# Patient Record
Sex: MALE | Race: WHITE | Employment: OTHER | URBAN - METROPOLITAN AREA
[De-identification: names, ages, dates, MRNs, and addresses within clinical notes are randomized per-mention and may not be internally consistent; named-entity substitution may affect disease eponyms.]

---

## 2017-02-06 ENCOUNTER — IP HISTORICAL/CONVERTED ENCOUNTER (OUTPATIENT)
Dept: OTHER | Age: 82
End: 2017-02-06

## 2017-02-10 ENCOUNTER — TELEPHONE (OUTPATIENT)
Dept: NEUROLOGY | Age: 82
End: 2017-02-10

## 2017-02-10 NOTE — TELEPHONE ENCOUNTER
Pt's granddaughter calling to verify medications. He is in the hospital at [facility] and she wants to make sure the hospital gets him the proper dosages. She said 50% of the time he does not know where he is. Please give her a call back as soon as possible.

## 2017-02-22 RX ORDER — DONEPEZIL HYDROCHLORIDE 10 MG/1
TABLET, FILM COATED ORAL
Qty: 90 TAB | Refills: 5 | Status: SHIPPED | OUTPATIENT
Start: 2017-02-22

## 2017-05-08 ENCOUNTER — OFFICE VISIT (OUTPATIENT)
Dept: NEUROLOGY | Age: 82
End: 2017-05-08

## 2017-05-08 VITALS
HEART RATE: 72 BPM | HEIGHT: 65 IN | SYSTOLIC BLOOD PRESSURE: 100 MMHG | OXYGEN SATURATION: 97 % | DIASTOLIC BLOOD PRESSURE: 60 MMHG | RESPIRATION RATE: 16 BRPM | WEIGHT: 183 LBS | BODY MASS INDEX: 30.49 KG/M2

## 2017-05-08 DIAGNOSIS — G30.1 LATE ONSET ALZHEIMER'S DISEASE WITHOUT BEHAVIORAL DISTURBANCE (HCC): Primary | ICD-10-CM

## 2017-05-08 DIAGNOSIS — F02.80 LATE ONSET ALZHEIMER'S DISEASE WITHOUT BEHAVIORAL DISTURBANCE (HCC): Primary | ICD-10-CM

## 2017-05-08 RX ORDER — CARVEDILOL 12.5 MG/1
TABLET ORAL 2 TIMES DAILY WITH MEALS
COMMUNITY

## 2017-05-08 RX ORDER — MEMANTINE HYDROCHLORIDE 5 MG-10 MG
KIT ORAL
Qty: 40 TAB | Refills: 1 | Status: SHIPPED | OUTPATIENT
Start: 2017-05-08 | End: 2020-08-11 | Stop reason: SDUPTHER

## 2017-05-08 RX ORDER — LISINOPRIL 2.5 MG/1
TABLET ORAL DAILY
COMMUNITY

## 2017-05-08 RX ORDER — MONTELUKAST SODIUM 10 MG/1
10 TABLET ORAL DAILY
COMMUNITY

## 2017-05-08 RX ORDER — BUMETANIDE 0.5 MG/1
TABLET ORAL DAILY
COMMUNITY

## 2017-05-08 RX ORDER — TAMSULOSIN HYDROCHLORIDE 0.4 MG/1
0.4 CAPSULE ORAL DAILY
COMMUNITY

## 2017-05-08 NOTE — PROGRESS NOTES
Chief Complaint   Patient presents with    Memory Loss         HISTORY OF PRESENT ILLNESS  Inga Aguilera came back for a follow up. He was last seen a year ago and was diagnosed with mild to moderate dementia of Alzheimer's type. He came along with his daughter . He was recently in the hospital for pneumonia that he developed the day after he returned from a cruise. Daughter believes that his memory has declined especially since his recent hospitalization. He denies any change . He is currently on donepezil and took Namenda starter pack but did not continue it. His memory problem started about 2 years ago . He was an  and used to work for [employer]. He has always been very sharp and organized person. However he is having difficulty with doing simpler things and has become very forgetful. He will tend to ask the same question over and over again. He remains independent with the all activities of daily living. 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 He does not drive. He has COPD and uses home oxygen. There are no reports of him getting lost. There are no behavioral or sleep issues. He is currently living with his wife in their home in [place]. Current Outpatient Prescriptions   Medication Sig    carvedilol (COREG) 12.5 mg tablet Take  by mouth two (2) times daily (with meals).  bumetanide (BUMEX) 0.5 mg tablet Take  by mouth daily.  tamsulosin (FLOMAX) 0.4 mg capsule Take 0.4 mg by mouth daily.  montelukast (SINGULAIR) 10 mg tablet Take 10 mg by mouth daily.  lisinopril (PRINIVIL, ZESTRIL) 2.5 mg tablet Take  by mouth daily.  Cetirizine (ZYRTEC) 10 mg cap Take  by mouth.  multivitamin, tx-iron-ca-min (THERA-M W/ IRON) 9 mg iron-400 mcg tab tablet Take 1 Tab by mouth daily.  VIT A/VIT C/VIT E/ZINC/COPPER (PRESERVISION AREDS PO) Take  by mouth.     memantine 5-10 mg DsPk Take as directed    donepezil (ARICEPT) 10 mg tablet TAKE 1 TABLET BY MOUTH NIGHTLY    fluticasone-salmeterol (ADVAIR DISKUS) 500-50 mcg/dose diskus inhaler Take 1 Puff by inhalation every twelve (12) hours.  simvastatin (ZOCOR) 10 mg tablet Take  by mouth nightly. No current facility-administered medications for this visit. PHYSICAL EXAMINATION:    Visit Vitals    /60    Pulse 72    Resp 16    Ht 5' 5\" (1.651 m)    Wt 83 kg (183 lb)    SpO2 97%    BMI 30.45 kg/m2         NEUROLOGICAL EXAMINATION:     Mental Status:   Alert and oriented to person. He could not remember today's date correctly. He did not know the name of this hospital.  he scored 21/30 on [facility] cognitive assessment. His main issue is short-term recall and delayed recall which was 0/5. Attention span and concentration are normal. Speech is fluent with a full fund of knowledge. Cranial Nerves:    II, III, IV, VI:  Visual acuity grossly intact. Visual fields are normal.    Pupils are equal, round, and reactive to light and accommodation. Extra-ocular movements are full and fluid. Fundoscopic exam was benign, no ptosis or nystagmus. V-XII: Hearing is grossly intact. Facial features are symmetric, with normal sensation and strength. The palate rises symmetrically and the tongue protrudes midline. Sternocleidomastoids 5/5. Motor Examination: Normal tone, bulk, and strength. 5/5 muscle strength throughout. No cogwheel rigidity or clonus present. Sensory exam:  Normal throughout to pinprick, temperature, and vibration sense. Normal proprioception. Coordination:  Heel-to-shin was smooth and symmetrical bilaterally. Finger to nose and rapid arm movement testing was normal.   No resting or intention tremor    Gait and Station:  Steady while walking on toes, heels, and with tandem walking. Normal arm swing. No Rhomberg or pronator drift. No muscle wasting or fasiculations noted. Reflexes:  DTRs 2+ throughout. Toes downgoing.       LABS    CT brain and B12 was normal.TSH was elevated. T3 and T4 was normal.         ASSESSMENT    ICD-10-CM ICD-9-CM    1. Late onset Alzheimer's disease without behavioral disturbance G30.1 331.0 memantine 5-10 mg DsPk    F02.80 294.10          DISCUSSION  Mr. Tyesha Faria Has mild to moderate dementia, likely of Alzheimer's type. He has had a slight decline over the past 6 months. Will maintain him on Aricept and restart Namenda. He remains independent without all activities of daily living. He is currently living with his wife and their daughter checks on them regularly. There are no safety concerns reported by family at this point. He wishes to follow-up with a neurologist closer to his home and I will continue to provide him with any care that he may need until then.      Jenifer Joshi MD  Diplomate, American Board of Psychiatry & Neurology (Neurology)  Daniel Leung Board of Psychiatry & Neurology (Clinical Neurophysiology)  Diplomate, American Board of Electrodiagnostic Medicine

## 2017-05-08 NOTE — PROGRESS NOTES
Patient is here for follow up  Had pneumonia   Also had a bleeding episode on Plavix  Memory is worse according to daughter

## 2017-05-08 NOTE — MR AVS SNAPSHOT
Visit Information Date & Time Provider Department Dept. Phone Encounter #  
 5/8/2017  3:15 PM Lazaro Lambert MD [facility] at [facility] [phone] Follow-up Instructions Return if symptoms worsen or fail to improve. Upcoming Health Maintenance Date Due DTaP/Tdap/Td series (1 - Tdap) 12/10/1955 ZOSTER VACCINE AGE 60> 12/10/1994 GLAUCOMA SCREENING Q2Y 12/10/1999 Pneumococcal 65+ Low/Medium Risk (1 of 2 - PCV13) 12/10/1999 MEDICARE YEARLY EXAM 12/10/1999 INFLUENZA AGE 9 TO ADULT 8/1/2017 Allergies as of 5/8/2017  Review Complete On: 5/8/2017 By: Lazaro Lambert MD  
 No Known Allergies Current Immunizations  Never Reviewed No immunizations on file. Not reviewed this visit You Were Diagnosed With   
  
 Codes Comments Late onset Alzheimer's disease without behavioral disturbance    -  Primary ICD-10-CM: G30.1, F02.80 ICD-9-CM: 331.0, 294.10 Vitals BP Pulse Resp Height(growth percentile) Weight(growth percentile) SpO2  
 100/60 72 16 5' 5\" (1.651 m) 183 lb (83 kg) 97% BMI Smoking Status 30.45 kg/m2 Never Smoker Vitals History BMI and BSA Data Body Mass Index Body Surface Area  
 30.45 kg/m 2 1.95 m 2 Preferred Pharmacy Pharmacy Name Phone [facility] [address] [facility] [phone] Your Updated Medication List  
  
   
This list is accurate as of: 5/8/17  4:00 PM.  Always use your most recent med list.  
  
  
  
  
 ADVAIR DISKUS 500-50 mcg/dose diskus inhaler Generic drug:  fluticasone-salmeterol Take 1 Puff by inhalation every twelve (12) hours. bumetanide 0.5 mg tablet Commonly known as:  Shirrosalie Nicholasch Take  by mouth daily. COREG 12.5 mg tablet Generic drug:  carvedilol Take  by mouth two (2) times daily (with meals).   
  
 donepezil 10 mg tablet Commonly known as:  ARICEPT  
TAKE 1 TABLET BY MOUTH NIGHTLY  
  
 lisinopril 2.5 mg tablet Commonly known as:  Salvadore Dine Take  by mouth daily. memantine 5-10 mg Dspk Take as directed  
  
 montelukast 10 mg tablet Commonly known as:  SINGULAIR Take 10 mg by mouth daily. multivitamin, tx-iron-ca-min 9 mg iron-400 mcg Tab tablet Commonly known as:  THERA-M w/ IRON Take 1 Tab by mouth daily. PRESERVISION AREDS PO Take  by mouth. tamsulosin 0.4 mg capsule Commonly known as:  FLOMAX Take 0.4 mg by mouth daily. ZOCOR 10 mg tablet Generic drug:  simvastatin Take  by mouth nightly. ZyrTEC 10 mg Cap Generic drug:  Cetirizine Take  by mouth. Prescriptions Sent to Pharmacy Refills  
 memantine 5-10 mg DsPk 1 Sig: Take as directed Class: Normal  
 Pharmacy: [facility] [place] [id number] AT [address] #: [phone] Follow-up Instructions Return if symptoms worsen or fail to improve. Patient Instructions A Healthy Lifestyle: Care Instructions Your Care Instructions A healthy lifestyle can help you feel good, stay at a healthy weight, and have plenty of energy for both work and play. A healthy lifestyle is something you can share with your whole family. A healthy lifestyle also can lower your risk for serious health problems, such as high blood pressure, heart disease, and diabetes. You can follow a few steps listed below to improve your health and the health of your family. Follow-up care is a key part of your treatment and safety. Be sure to make and go to all appointments, and call your doctor if you are having problems. Its also a good idea to know your test results and keep a list of the medicines you take. How can you care for yourself at home? · Do not eat too much sugar, fat, or fast foods.  You can still have dessert and treats now and then. The goal is moderation. · Start small to improve your eating habits. Pay attention to portion sizes, drink less juice and soda pop, and eat more fruits and vegetables. ¨ Eat a healthy amount of food. A 3-ounce serving of meat, for example, is about the size of a deck of cards. Fill the rest of your plate with vegetables and whole grains. ¨ Limit the amount of soda and sports drinks you have every day. Drink more water when you are thirsty. ¨ Eat at least 5 servings of fruits and vegetables every day. It may seem like a lot, but it is not hard to reach this goal. A serving or helping is 1 piece of fruit, 1 cup of vegetables, or 2 cups of leafy, raw vegetables. Have an apple or some carrot sticks as an afternoon snack instead of a candy bar. Try to have fruits and/or vegetables at every meal. 
· Make exercise part of your daily routine. You may want to start with simple activities, such as walking, bicycling, or slow swimming. Try to be active 30 to 60 minutes every day. You do not need to do all 30 to 60 minutes all at once. For example, you can exercise 3 times a day for 10 or 20 minutes. Moderate exercise is safe for most people, but it is always a good idea to talk to your doctor before starting an exercise program. 
· Keep moving. [employer] the lawn, work in the garden, or [employer]. Take the stairs instead of the elevator at work. · If you smoke, quit. People who smoke have an increased risk for heart attack, stroke, cancer, and other lung illnesses. Quitting is hard, but there are ways to boost your chance of quitting tobacco for good. ¨ Use nicotine gum, patches, or lozenges. ¨ Ask your doctor about stop-smoking programs and medicines. ¨ Keep trying. In addition to reducing your risk of diseases in the future, you will notice some benefits soon after you stop using tobacco. If you have shortness of breath or asthma symptoms, they will likely get better within a few weeks after you quit.  
· Limit how much alcohol you drink. Moderate amounts of alcohol (up to 2 drinks a day for men, 1 drink a day for women) are okay. But drinking too much can lead to liver problems, high blood pressure, and other health problems. Family health If you have a family, there are many things you can do together to improve your health. · Eat meals together as a family as often as possible. · Eat healthy foods. This includes fruits, vegetables, lean meats and dairy, and whole grains. · Include your family in your fitness plan. Most people think of activities such as jogging or tennis as the way to fitness, but there are many ways you and your family can be more active. Anything that makes you breathe hard and gets your heart pumping is exercise. Here are some tips: 
¨ Walk to do errands or to take your child to school or the bus. ¨ Go for a family bike ride after dinner instead of watching TV. Where can you learn more? Go to http://chris-noe.info/. Enter G182 in the search box to learn more about \"A Healthy Lifestyle: Care Instructions. \" Current as of: July 26, 2016 Content Version: 11.2 © 2851-3217 BackupAgent. Care instructions adapted under license by [employer] (which disclaims liability or warranty for this information). If you have questions about a medical condition or this instruction, always ask your healthcare professional. [address] any warranty or liability for your use of this information. Introducing [facility] [place] [facility]! Heladio Hernandez introduces QuickProNotes patient portal. Now you can access parts of your medical record, email your doctor's office, and request medication refills online. 1. In your internet browser, go to [email or web address] [email or web address]/[employer] 2. Click on the First Time User? Click Here link in the Sign In box. You will see the New Member Sign Up page.  
3. Enter your QuickProNotes Access Code exactly as it appears below. You will not need to use this code after youve completed the sign-up process. If you do not sign up before the expiration date, you must request a new code. · PoshVine Access Code: [email or web address] Expires: 8/6/2017  3:17 PM 
 
4. Enter the last four digits of your Social Security Number (xxxx) and Date of Birth (mm/dd/yyyy) as indicated and click Submit. You will be taken to the next sign-up page. 5. Create a PoshVine ID. This will be your PoshVine login ID and cannot be changed, so think of one that is secure and easy to remember. 6. Create a PoshVine password. You can change your password at any time. 7. Enter your Password Reset Question and Answer. This can be used at a later time if you forget your password. 8. Enter your e-mail address. You will receive e-mail notification when new information is available in [address] 9. Click Sign Up. You can now view and download portions of your medical record. 10. Click the Download Summary menu link to download a portable copy of your medical information. If you have questions, please visit the Frequently Asked Questions section of the PoshVine website. Remember, PoshVine is NOT to be used for urgent needs. For medical emergencies, dial 911. Now available from your iPhone and Android! Please provide this summary of care documentation to your next provider. Your primary care clinician is listed as Tania Petty. If you have any questions after today's visit, please call [phone].

## 2017-11-22 ENCOUNTER — OP HISTORICAL/CONVERTED ENCOUNTER (OUTPATIENT)
Dept: OTHER | Age: 82
End: 2017-11-22

## 2017-12-01 ENCOUNTER — OP HISTORICAL/CONVERTED ENCOUNTER (OUTPATIENT)
Dept: OTHER | Age: 82
End: 2017-12-01

## 2018-01-01 ENCOUNTER — OP HISTORICAL/CONVERTED ENCOUNTER (OUTPATIENT)
Dept: OTHER | Age: 83
End: 2018-01-01

## 2020-08-11 ENCOUNTER — OFFICE VISIT (OUTPATIENT)
Dept: NEUROLOGY | Facility: CLINIC | Age: 85
End: 2020-08-11
Payer: MEDICARE

## 2020-08-11 ENCOUNTER — TELEPHONE (OUTPATIENT)
Dept: NEUROLOGY | Facility: CLINIC | Age: 85
End: 2020-08-11

## 2020-08-11 VITALS
WEIGHT: 174 LBS | RESPIRATION RATE: 16 BRPM | SYSTOLIC BLOOD PRESSURE: 132 MMHG | OXYGEN SATURATION: 98 % | HEART RATE: 66 BPM | HEIGHT: 65 IN | DIASTOLIC BLOOD PRESSURE: 80 MMHG | BODY MASS INDEX: 28.99 KG/M2

## 2020-08-11 DIAGNOSIS — G30.9 MIXED ALZHEIMER'S AND VASCULAR DEMENTIA (HCC): Primary | ICD-10-CM

## 2020-08-11 DIAGNOSIS — F01.50 MIXED ALZHEIMER'S AND VASCULAR DEMENTIA (HCC): Primary | ICD-10-CM

## 2020-08-11 DIAGNOSIS — F02.80 MIXED ALZHEIMER'S AND VASCULAR DEMENTIA (HCC): Primary | ICD-10-CM

## 2020-08-11 PROCEDURE — G8510 SCR DEP NEG, NO PLAN REQD: HCPCS | Performed by: PSYCHIATRY & NEUROLOGY

## 2020-08-11 PROCEDURE — G8536 NO DOC ELDER MAL SCRN: HCPCS | Performed by: PSYCHIATRY & NEUROLOGY

## 2020-08-11 PROCEDURE — 1101F PT FALLS ASSESS-DOCD LE1/YR: CPT | Performed by: PSYCHIATRY & NEUROLOGY

## 2020-08-11 PROCEDURE — 99204 OFFICE O/P NEW MOD 45 MIN: CPT | Performed by: PSYCHIATRY & NEUROLOGY

## 2020-08-11 PROCEDURE — G8419 CALC BMI OUT NRM PARAM NOF/U: HCPCS | Performed by: PSYCHIATRY & NEUROLOGY

## 2020-08-11 PROCEDURE — G8427 DOCREV CUR MEDS BY ELIG CLIN: HCPCS | Performed by: PSYCHIATRY & NEUROLOGY

## 2020-08-11 RX ORDER — MEMANTINE HYDROCHLORIDE 5 MG-10 MG
KIT ORAL
Qty: 40 TAB | Refills: 1 | Status: SHIPPED | OUTPATIENT
Start: 2020-08-11 | End: 2020-09-08 | Stop reason: DRUGHIGH

## 2020-08-11 RX ORDER — BENZONATATE 100 MG/1
CAPSULE ORAL
COMMUNITY
Start: 2020-05-25

## 2020-08-11 NOTE — PROGRESS NOTES
Chief Complaint   Patient presents with    Follow-up     patient grand daughter states he has been doing less activities than usual. she states his appetite has also decreased.  Dementia     patient states he hate the fact he cant remember what he has done or where he placed somethng. he states it will eventually come to him. his grand daughter states she will have to refresh his memory every few minutes. HISTORY OF PRESENT ILLNESS  Mattie Olivas is a 80 y.o. male came in for an evaluation regarding his memory disorder. He has been evaluated by me in the past but last time he was seen over 3 years ago. He suspected to have follow-up mixed type of dementia. He came in along with his granddaughter today who tells me that overall there has been a slow decline. He is now more forgetful, tends to ask the same question repeatedly. He is unable to finish projects that he starts at home and is simply not able to do certain things that he was able to do before. He is living alone but his family checks on him multiple times a day and ensures that he takes his medications and eats all his meals. He is alone at night but there are no issues with sleep and no safety concerns expressed. He does not handle money or drive anymore. He remains independent with basic activities of daily living. 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 Has COPD and uses home oxygen. No reports ever of him getting lost.  No behavioral issues      Past Medical History:   Diagnosis Date    COPD (chronic obstructive pulmonary disease) (HCC)     Macular degeneration of right eye      Current Outpatient Medications   Medication Sig    benzonatate (TESSALON) 100 mg capsule TK 1 C PO TID PRF COUGH    memantine 5-10 mg DsPk Take as directed    carvedilol (COREG) 12.5 mg tablet Take  by mouth two (2) times daily (with meals).  bumetanide (BUMEX) 0.5 mg tablet Take  by mouth daily.     montelukast (SINGULAIR) 10 mg tablet Take 10 mg by mouth daily.  lisinopril (PRINIVIL, ZESTRIL) 2.5 mg tablet Take  by mouth daily.  Cetirizine (ZYRTEC) 10 mg cap Take  by mouth.  multivitamin, tx-iron-ca-min (THERA-M W/ IRON) 9 mg iron-400 mcg tab tablet Take 1 Tab by mouth daily.  VIT A/VIT C/VIT E/ZINC/COPPER (PRESERVISION AREDS PO) Take  by mouth.  donepezil (ARICEPT) 10 mg tablet TAKE 1 TABLET BY MOUTH NIGHTLY    fluticasone-salmeterol (ADVAIR DISKUS) 500-50 mcg/dose diskus inhaler Take 1 Puff by inhalation every twelve (12) hours.  simvastatin (ZOCOR) 10 mg tablet Take  by mouth nightly.  tamsulosin (FLOMAX) 0.4 mg capsule Take 0.4 mg by mouth daily. No current facility-administered medications for this visit. No Known Allergies  Family History   Problem Relation Age of Onset    Cancer Mother         Intestional    Stroke Father      Social History     Tobacco Use    Smoking status: Never Smoker    Smokeless tobacco: Never Used   Substance Use Topics    Alcohol use: No    Drug use: No     Past Surgical History:   Procedure Laterality Date    HX PROSTATECTOMY           REVIEW OF SYSTEMS  Review of Systems - History obtained from the patient  Psychological ROS: negative  ENT ROS: negative  Hematological and Lymphatic ROS: negative  Endocrine ROS: negative  Respiratory ROS: no cough, shortness of breath, or wheezing  Cardiovascular ROS: no chest pain or dyspnea on exertion  Gastrointestinal ROS: no abdominal pain, change in bowel habits, or black or bloody stools  Genito-Urinary ROS: no dysuria, trouble voiding, or hematuria  Musculoskeletal ROS: negative  Dermatological ROS: negative      PHYSICAL EXAMINATION:    Visit Vitals  /80 (BP 1 Location: Left arm, BP Patient Position: Sitting)   Pulse 66   Resp 16   Ht 5' 5\" (1.651 m)   Wt 78.9 kg (174 lb)   SpO2 98%   BMI 28.96 kg/m²     General:  Well nourished and groomed individual in no acute distress.     Neck: Supple, nontender, no bruits, no pain with resistance to active range of motion. Heart: Regular rate and rhythm. Normal S1S2. Lungs:  Equal chest expansion, no cough, no wheeze  Musculoskeletal:  Extremities revealed no edema and had full range of motion of joints. Psych:  Good mood and bright affect    NEUROLOGICAL EXAMINATION:     Mental Status:   Alert and oriented to person. He could not tell me today's date or name of this hospital.  He scored 22/30 on Yo cognitive assessment. His main concern is impaired short-term recall which is 0/5. Cranial Nerves:    II, III, IV, VI: He has legally blind in the right eye. Extra-ocular movements are full. V-XII: Hearing is grossly intact. Facial features are symmetric, with normal sensation and strength. The palate rises symmetrically and the tongue protrudes midline. Sternocleidomastoids 5/5. Motor Examination: Normal tone, bulk, and strength. 5/5 muscle strength throughout. No cogwheel rigidity or clonus present. Sensory exam:  Normal throughout to pinprick, temperature, and vibration sense. Normal proprioception. Coordination:  Finger to nose and rapid arm movement testing was normal.   No resting or intention tremor    Gait and Station:  Steady. Normal arm swing. No Rhomberg or pronator drift. No muscle wasting or fasiculations noted. Reflexes:  DTRs 2+ throughout. Toes downgoing. LABS / IMAGING  CT Results (most recent):  Results from Hospital Encounter encounter on 04/29/15   CT HEAD WO CONT    Narrative **Final Report**       ICD Codes / Adm. Diagnosis: 780.93   / Memory loss    Examination:  CT HEAD WO CON  - [id number] - Apr 29 2015  3:43PM  Accession No:  [id number]  Reason:  Memory Loss      REPORT:  Indication: Memory loss    Multiple axial images were obtained from the skull base to the vertex   without the use of intravenous contrast. Ventricles and sulci are normal for   patient's age. There is no midline shift or herniation.  The examination is   negative for acute infarct, mass lesion, or hemorrhage. 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 Vascular calcification is noted. IMPRESSION: No acute process. Signing/Reading Doctor: Ines Burton ([id number])    Approved: Ines Burton ([id number])  Apr 29 2015  3:59PM                                 Lab Results   Component Value Date/Time    Vitamin B12 671 04/27/2015 03:18 PM     Lab Results   Component Value Date/Time    TSH 5.880 (H) 04/27/2015 03:18 PM         ASSESSMENT    ICD-10-CM ICD-9-CM    1. [facility]'s [facility]  G30.9 331.0 memantine 5-10 mg DsPk    F01.50 294.10     F02.80 290.40        DISCUSSION  Mr. 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 His cognitive scores have more or less remained stable over the past 3 years and family was reassured in this regard  He is currently living alone but has a very supportive family who ensures that he takes his medications daily and eats all his meals  Per family he is more repetitive and forgetful and is unable to complete projects that he starts at home  He is currently on donepezil and will start Namenda and titrate up in a customary fashion  He should try to remain physically and mentally active and different resources to strengthen his memory were discussed  Follow-up periodically    Hemalatha Dixon MD  Diplomate, American Board of Psychiatry & Neurology (Neurology)  Diplomate, American Board of Psychiatry & Neurology (Clinical Neurophysiology)  Diplomate, American Board of Electrodiagnostic Medicine    This note will not be viewable in [address]

## 2020-08-11 NOTE — PROGRESS NOTES
patient states he hate the fact he cant remember what he has done or where he placed somethng. he states it will eventually come to him. his grand daughter states she will have to refresh his memory every few minutes. patient grand daughter states he has been doing less activities than usual. she states his appetite has also decreased. No other concerns at this time. .  Chief Complaint   Patient presents with    Follow-up     patient grand daughter states he has been doing less activities than usual. she states his appetite has also decreased.  Dementia     patient states he hate the fact he cant remember what he has done or where he placed somethng. he states it will eventually come to him. his grand daughter states she will have to refresh his memory every few minutes.      .  Visit Vitals  /80 (BP 1 Location: Left arm, BP Patient Position: Sitting)   Pulse 66   Resp 16   Ht 5' 5\" (1.651 m)   Wt 174 lb (78.9 kg)   SpO2 98%   BMI 28.96 kg/m²

## 2020-09-08 DIAGNOSIS — G30.9 MIXED ALZHEIMER'S AND VASCULAR DEMENTIA (HCC): Primary | ICD-10-CM

## 2020-09-08 DIAGNOSIS — F02.80 MIXED ALZHEIMER'S AND VASCULAR DEMENTIA (HCC): Primary | ICD-10-CM

## 2020-09-08 DIAGNOSIS — F01.50 MIXED ALZHEIMER'S AND VASCULAR DEMENTIA (HCC): Primary | ICD-10-CM

## 2020-09-08 RX ORDER — MEMANTINE HYDROCHLORIDE 10 MG/1
10 TABLET ORAL 2 TIMES DAILY
Qty: 60 TAB | Refills: 3 | Status: SHIPPED | OUTPATIENT
Start: 2020-09-08 | End: 2021-01-12

## 2021-01-12 DIAGNOSIS — F02.80 MIXED ALZHEIMER'S AND VASCULAR DEMENTIA (HCC): ICD-10-CM

## 2021-01-12 DIAGNOSIS — F01.50 MIXED ALZHEIMER'S AND VASCULAR DEMENTIA (HCC): ICD-10-CM

## 2021-01-12 DIAGNOSIS — G30.9 MIXED ALZHEIMER'S AND VASCULAR DEMENTIA (HCC): ICD-10-CM

## 2021-01-12 RX ORDER — MEMANTINE HYDROCHLORIDE 10 MG/1
TABLET ORAL
Qty: 60 TAB | Refills: 3 | Status: SHIPPED | OUTPATIENT
Start: 2021-01-12 | End: 2021-04-23

## 2022-03-09 DIAGNOSIS — F02.80 MIXED ALZHEIMER'S AND VASCULAR DEMENTIA (HCC): ICD-10-CM

## 2022-03-09 DIAGNOSIS — F01.50 MIXED ALZHEIMER'S AND VASCULAR DEMENTIA (HCC): ICD-10-CM

## 2022-03-09 DIAGNOSIS — G30.9 MIXED ALZHEIMER'S AND VASCULAR DEMENTIA (HCC): ICD-10-CM

## 2022-03-10 RX ORDER — MEMANTINE HYDROCHLORIDE 10 MG/1
TABLET ORAL
Qty: 180 TABLET | Refills: 2 | Status: SHIPPED | OUTPATIENT
Start: 2022-03-10

## 2022-05-09 NOTE — PATIENT INSTRUCTIONS

## 2022-06-02 ENCOUNTER — HOSPITAL ENCOUNTER (INPATIENT)
Age: 87
LOS: 1 days | Discharge: HOME | DRG: 191 | End: 2022-06-03
Attending: EMERGENCY MEDICINE | Admitting: INTERNAL MEDICINE
Payer: MEDICARE

## 2022-06-02 DIAGNOSIS — R06.02 SOB (SHORTNESS OF BREATH): ICD-10-CM

## 2022-06-02 DIAGNOSIS — I10 HYPERTENSION, UNSPECIFIED TYPE: ICD-10-CM

## 2022-06-02 DIAGNOSIS — F03.90 DEMENTIA WITHOUT BEHAVIORAL DISTURBANCE, UNSPECIFIED DEMENTIA TYPE: ICD-10-CM

## 2022-06-02 DIAGNOSIS — J44.1 COPD EXACERBATION (HCC): ICD-10-CM

## 2022-06-02 DIAGNOSIS — J18.9 PNEUMONIA OF BOTH LOWER LOBES DUE TO INFECTIOUS ORGANISM: Primary | ICD-10-CM

## 2022-06-02 PROCEDURE — 96374 THER/PROPH/DIAG INJ IV PUSH: CPT

## 2022-06-02 PROCEDURE — 99285 EMERGENCY DEPT VISIT HI MDM: CPT

## 2022-06-02 PROCEDURE — 96375 TX/PRO/DX INJ NEW DRUG ADDON: CPT

## 2022-06-02 RX ORDER — DOXYCYCLINE 100 MG/1
100 CAPSULE ORAL 2 TIMES DAILY
COMMUNITY

## 2022-06-02 RX ORDER — IPRATROPIUM BROMIDE AND ALBUTEROL SULFATE 2.5; .5 MG/3ML; MG/3ML
6 SOLUTION RESPIRATORY (INHALATION)
Status: COMPLETED | OUTPATIENT
Start: 2022-06-02 | End: 2022-06-03

## 2022-06-02 NOTE — Clinical Note
Status[de-identified] INPATIENT [101]   Type of Bed: Stepdown [17]   Cardiac Monitoring Required?: Yes   Inpatient Hospitalization Certified Necessary for the Following Reasons: 3.  Patient receiving treatment that can only be provided in an inpatient setting (further clarification in H&P documentation)   Admitting Diagnosis: CHF (congestive heart failure) [facility]) [[id number]]   Admitting Physician: Rolo Paez [[id number]]   Attending Physician: Rolo Paez [[id number]]   Estimated Length of Stay: 3-4 Midnights   Discharge Plan[de-identified] Home with Office Follow-up

## 2022-06-03 ENCOUNTER — APPOINTMENT (OUTPATIENT)
Dept: CT IMAGING | Age: 87
DRG: 191 | End: 2022-06-03
Attending: EMERGENCY MEDICINE
Payer: MEDICARE

## 2022-06-03 ENCOUNTER — APPOINTMENT (OUTPATIENT)
Dept: NON INVASIVE DIAGNOSTICS | Age: 87
DRG: 191 | End: 2022-06-03
Attending: INTERNAL MEDICINE
Payer: MEDICARE

## 2022-06-03 ENCOUNTER — APPOINTMENT (OUTPATIENT)
Dept: GENERAL RADIOLOGY | Age: 87
DRG: 191 | End: 2022-06-03
Attending: EMERGENCY MEDICINE
Payer: MEDICARE

## 2022-06-03 VITALS
HEART RATE: 84 BPM | BODY MASS INDEX: 29.49 KG/M2 | DIASTOLIC BLOOD PRESSURE: 67 MMHG | WEIGHT: 177 LBS | SYSTOLIC BLOOD PRESSURE: 105 MMHG | TEMPERATURE: 97.7 F | RESPIRATION RATE: 16 BRPM | OXYGEN SATURATION: 97 % | HEIGHT: 65 IN

## 2022-06-03 PROBLEM — I50.9 CHF (CONGESTIVE HEART FAILURE) (HCC): Status: ACTIVE | Noted: 2022-06-03

## 2022-06-03 PROBLEM — D72.829 LEUKOCYTOSIS: Status: ACTIVE | Noted: 2022-06-03

## 2022-06-03 PROBLEM — R60.0 EDEMA, LOWER EXTREMITY: Status: ACTIVE | Noted: 2022-06-03

## 2022-06-03 PROBLEM — J96.11 CHRONIC HYPOXEMIC RESPIRATORY FAILURE (HCC): Status: ACTIVE | Noted: 2022-06-03

## 2022-06-03 LAB
ALBUMIN SERPL-MCNC: 3.9 G/DL (ref 3.5–5.2)
ALBUMIN/GLOB SERPL: 1.1 {RATIO} (ref 1.1–2.2)
ALP SERPL-CCNC: 32 U/L (ref 40–129)
ALT SERPL-CCNC: 14 U/L (ref 10–50)
ANION GAP SERPL CALC-SCNC: 12 MMOL/L (ref 5–15)
AST SERPL-CCNC: 22 U/L (ref 10–50)
ATRIAL RATE: 90 BPM
B PERT DNA SPEC QL NAA+PROBE: NOT DETECTED
BASE DEFICIT BLDV-SCNC: 0.2 MMOL/L
BASOPHILS # BLD: 0 K/UL
BASOPHILS NFR BLD: 0 %
BILIRUB SERPL-MCNC: 0.9 MG/DL (ref 0.2–1)
BNP SERPL-MCNC: 657 PG/ML
BORDETELLA PARAPERTUSSIS PCR, BORPAR: NOT DETECTED
BUN SERPL-MCNC: 17 MG/DL (ref 8–23)
BUN/CREAT SERPL: 20 (ref 12–20)
C PNEUM DNA SPEC QL NAA+PROBE: NOT DETECTED
CALCIUM SERPL-MCNC: 9.1 MG/DL (ref 8.8–10.2)
CALCULATED P AXIS, ECG09: 81 DEGREES
CALCULATED R AXIS, ECG10: 19 DEGREES
CALCULATED T AXIS, ECG11: 92 DEGREES
CHLORIDE SERPL-SCNC: 97 MMOL/L (ref 98–107)
CO2 SERPL-SCNC: 26 MMOL/L (ref 22–29)
COVID-19 RAPID TEST, COVR: NOT DETECTED
CREAT SERPL-MCNC: 0.87 MG/DL (ref 0.7–1.2)
D DIMER PPP FEU-MCNC: 0.59 UG/ML(FEU)
DIAGNOSIS, 93000: NORMAL
DIFFERENTIAL METHOD BLD: ABNORMAL
ECHO AO ASC DIAM: 2.1 CM
ECHO AO ASCENDING AORTA INDEX: 1.12 CM/M2
ECHO AV AREA PEAK VELOCITY: 2 CM2
ECHO AV AREA VTI: 1.9 CM2
ECHO AV AREA/BSA PEAK VELOCITY: 1.1 CM2/M2
ECHO AV AREA/BSA VTI: 1 CM2/M2
ECHO AV MEAN GRADIENT: 2 MMHG
ECHO AV MEAN VELOCITY: 0.7 M/S
ECHO AV PEAK GRADIENT: 5 MMHG
ECHO AV PEAK VELOCITY: 1.1 M/S
ECHO AV VELOCITY RATIO: 0.73
ECHO AV VTI: 27.4 CM
ECHO LA DIAMETER INDEX: 1.86 CM/M2
ECHO LA DIAMETER: 3.5 CM
ECHO LA VOL 2C: 40 ML (ref 18–58)
ECHO LA VOL 4C: 36 ML (ref 18–58)
ECHO LA VOL BP: 38 ML (ref 18–58)
ECHO LA VOL/BSA BIPLANE: 20 ML/M2 (ref 16–34)
ECHO LA VOLUME AREA LENGTH: 40 ML
ECHO LA VOLUME INDEX A2C: 21 ML/M2 (ref 16–34)
ECHO LA VOLUME INDEX A4C: 19 ML/M2 (ref 16–34)
ECHO LA VOLUME INDEX AREA LENGTH: 21 ML/M2 (ref 16–34)
ECHO LV E' LATERAL VELOCITY: 10 CM/S
ECHO LV E' SEPTAL VELOCITY: 7 CM/S
ECHO LV EDV A2C: 81 ML
ECHO LV EDV A4C: 80 ML
ECHO LV EDV BP: 81 ML (ref 67–155)
ECHO LV EDV INDEX A4C: 43 ML/M2
ECHO LV EDV INDEX BP: 43 ML/M2
ECHO LV EDV NDEX A2C: 43 ML/M2
ECHO LV EJECTION FRACTION A2C: 55 %
ECHO LV EJECTION FRACTION A4C: 60 %
ECHO LV EJECTION FRACTION BIPLANE: 55 % (ref 55–100)
ECHO LV ESV A2C: 36 ML
ECHO LV ESV A4C: 32 ML
ECHO LV ESV BP: 36 ML (ref 22–58)
ECHO LV ESV INDEX A2C: 19 ML/M2
ECHO LV ESV INDEX A4C: 17 ML/M2
ECHO LV ESV INDEX BP: 19 ML/M2
ECHO LV FRACTIONAL SHORTENING: 30 % (ref 28–44)
ECHO LV INTERNAL DIMENSION DIASTOLE INDEX: 2.45 CM/M2
ECHO LV INTERNAL DIMENSION DIASTOLIC: 4.6 CM (ref 4.2–5.9)
ECHO LV INTERNAL DIMENSION SYSTOLIC INDEX: 1.7 CM/M2
ECHO LV INTERNAL DIMENSION SYSTOLIC: 3.2 CM
ECHO LV IVSD: 1.2 CM (ref 0.6–1)
ECHO LV MASS 2D: 169.9 G (ref 88–224)
ECHO LV MASS INDEX 2D: 90.3 G/M2 (ref 49–115)
ECHO LV POSTERIOR WALL DIASTOLIC: 0.9 CM (ref 0.6–1)
ECHO LV RELATIVE WALL THICKNESS RATIO: 0.39
ECHO LVOT AREA: 2.8 CM2
ECHO LVOT AV VTI INDEX: 0.67
ECHO LVOT DIAM: 1.9 CM
ECHO LVOT MEAN GRADIENT: 1 MMHG
ECHO LVOT PEAK GRADIENT: 2 MMHG
ECHO LVOT PEAK VELOCITY: 0.8 M/S
ECHO LVOT STROKE VOLUME INDEX: 27.6 ML/M2
ECHO LVOT SV: 51.9 ML
ECHO LVOT VTI: 18.3 CM
ECHO MV A VELOCITY: 0.69 M/S
ECHO MV AREA VTI: 2.5 CM2
ECHO MV E DECELERATION TIME (DT): 271.8 MS
ECHO MV E VELOCITY: 0.54 M/S
ECHO MV E/A RATIO: 0.78
ECHO MV E/E' LATERAL: 5.4
ECHO MV E/E' RATIO (AVERAGED): 6.56
ECHO MV E/E' SEPTAL: 7.71
ECHO MV LVOT VTI INDEX: 1.14
ECHO MV MAX VELOCITY: 0.7 M/S
ECHO MV MEAN GRADIENT: 1 MMHG
ECHO MV MEAN VELOCITY: 0.4 M/S
ECHO MV PEAK GRADIENT: 2 MMHG
ECHO MV VTI: 20.8 CM
ECHO RV TAPSE: 2.4 CM (ref 1.7–?)
EOSINOPHIL # BLD: 0 K/UL
EOSINOPHIL NFR BLD: 0 %
ERYTHROCYTE [DISTWIDTH] IN BLOOD BY AUTOMATED COUNT: 14.6 % (ref 11.5–14.5)
FLUAV H1 2009 PAND RNA SPEC QL NAA+PROBE: NOT DETECTED
FLUAV H1 RNA SPEC QL NAA+PROBE: NOT DETECTED
FLUAV H3 RNA SPEC QL NAA+PROBE: NOT DETECTED
FLUAV SUBTYP SPEC NAA+PROBE: NOT DETECTED
FLUBV RNA SPEC QL NAA+PROBE: NOT DETECTED
GLOBULIN SER CALC-MCNC: 3.5 G/DL (ref 2–4)
GLUCOSE SERPL-MCNC: 149 MG/DL (ref 65–100)
HADV DNA SPEC QL NAA+PROBE: NOT DETECTED
HCO3 BLDV-SCNC: 24.9 MMOL/L (ref 23–28)
HCOV 229E RNA SPEC QL NAA+PROBE: NOT DETECTED
HCOV HKU1 RNA SPEC QL NAA+PROBE: NOT DETECTED
HCOV NL63 RNA SPEC QL NAA+PROBE: NOT DETECTED
HCOV OC43 RNA SPEC QL NAA+PROBE: NOT DETECTED
HCT VFR BLD AUTO: 37.7 % (ref 36.6–50.3)
HGB BLD-MCNC: 12.1 G/DL (ref 12.1–17)
HMPV RNA SPEC QL NAA+PROBE: NOT DETECTED
HPIV1 RNA SPEC QL NAA+PROBE: NOT DETECTED
HPIV2 RNA SPEC QL NAA+PROBE: NOT DETECTED
HPIV3 RNA SPEC QL NAA+PROBE: NOT DETECTED
HPIV4 RNA SPEC QL NAA+PROBE: NOT DETECTED
IMM GRANULOCYTES # BLD AUTO: 0 K/UL
IMM GRANULOCYTES NFR BLD AUTO: 0 %
INR PPP: 1.3 (ref 0.9–1.1)
LACTATE SERPL-SCNC: 1.3 MMOL/L (ref 0.4–2)
LYMPHOCYTES # BLD: 1 K/UL
LYMPHOCYTES NFR BLD: 7 %
M PNEUMO DNA SPEC QL NAA+PROBE: NOT DETECTED
MCH RBC QN AUTO: 28 PG (ref 26–34)
MCHC RBC AUTO-ENTMCNC: 32.1 G/DL (ref 30–36.5)
MCV RBC AUTO: 87.3 FL (ref 80–99)
MONOCYTES # BLD: 2.2 K/UL
MONOCYTES NFR BLD: 16 %
NEUTS BAND NFR BLD MANUAL: 3 % (ref 0–6)
NEUTS SEG # BLD: 10.6 K/UL
NEUTS SEG NFR BLD: 74 %
NRBC # BLD: 0 K/UL (ref 0–0.01)
NRBC BLD-RTO: 0 PER 100 WBC
P-R INTERVAL, ECG05: 116 MS
PCO2 BLDV: 41.3 MMHG (ref 41–51)
PH BLDV: 7.39 [PH] (ref 7.32–7.42)
PLATELET # BLD AUTO: 171 K/UL (ref 150–400)
PMV BLD AUTO: 11.5 FL (ref 8.9–12.9)
PO2 BLDV: 58 MMHG (ref 25–40)
POTASSIUM SERPL-SCNC: 4.1 MMOL/L (ref 3.5–5.1)
PROCALCITONIN SERPL-MCNC: 0.13 NG/ML
PROT SERPL-MCNC: 7.4 G/DL (ref 6.4–8.3)
PROTHROMBIN TIME: 16.5 SEC (ref 11.9–14.6)
Q-T INTERVAL, ECG07: 334 MS
QRS DURATION, ECG06: 90 MS
QTC CALCULATION (BEZET), ECG08: 408 MS
RBC # BLD AUTO: 4.32 M/UL (ref 4.1–5.7)
RBC MORPH BLD: ABNORMAL
RSV RNA SPEC QL NAA+PROBE: NOT DETECTED
RV+EV RNA SPEC QL NAA+PROBE: NOT DETECTED
SAO2 % BLDV: 89.1 % (ref 65–88)
SARS-COV-2 PCR, COVPCR: NOT DETECTED
SERVICE CMNT-IMP: ABNORMAL
SODIUM SERPL-SCNC: 135 MMOL/L (ref 136–145)
SOURCE, COVRS: NORMAL
SPECIMEN TYPE: ABNORMAL
TROPONIN I BLD-MCNC: <0.04 NG/ML (ref 0–0.08)
TSH SERPL DL<=0.05 MIU/L-ACNC: 1.86 UIU/ML (ref 0.27–4.2)
VENTRICULAR RATE, ECG03: 90 BPM
WBC # BLD AUTO: 13.8 K/UL (ref 4.1–11.1)

## 2022-06-03 PROCEDURE — 93005 ELECTROCARDIOGRAM TRACING: CPT

## 2022-06-03 PROCEDURE — 84484 ASSAY OF TROPONIN QUANT: CPT

## 2022-06-03 PROCEDURE — 74011250636 HC RX REV CODE- 250/636: Performed by: INTERNAL MEDICINE

## 2022-06-03 PROCEDURE — 85379 FIBRIN DEGRADATION QUANT: CPT

## 2022-06-03 PROCEDURE — 94664 DEMO&/EVAL PT USE INHALER: CPT

## 2022-06-03 PROCEDURE — 65270000046 HC RM TELEMETRY

## 2022-06-03 PROCEDURE — 74011250637 HC RX REV CODE- 250/637: Performed by: EMERGENCY MEDICINE

## 2022-06-03 PROCEDURE — 74011000250 HC RX REV CODE- 250: Performed by: INTERNAL MEDICINE

## 2022-06-03 PROCEDURE — 80053 COMPREHEN METABOLIC PANEL: CPT

## 2022-06-03 PROCEDURE — 97535 SELF CARE MNGMENT TRAINING: CPT

## 2022-06-03 PROCEDURE — 71250 CT THORAX DX C-: CPT

## 2022-06-03 PROCEDURE — G0378 HOSPITAL OBSERVATION PER HR: HCPCS

## 2022-06-03 PROCEDURE — 97165 OT EVAL LOW COMPLEX 30 MIN: CPT

## 2022-06-03 PROCEDURE — 74011250637 HC RX REV CODE- 250/637: Performed by: INTERNAL MEDICINE

## 2022-06-03 PROCEDURE — 74011250636 HC RX REV CODE- 250/636: Performed by: EMERGENCY MEDICINE

## 2022-06-03 PROCEDURE — 93306 TTE W/DOPPLER COMPLETE: CPT | Performed by: SPECIALIST

## 2022-06-03 PROCEDURE — 87635 SARS-COV-2 COVID-19 AMP PRB: CPT

## 2022-06-03 PROCEDURE — 84443 ASSAY THYROID STIM HORMONE: CPT

## 2022-06-03 PROCEDURE — 87040 BLOOD CULTURE FOR BACTERIA: CPT

## 2022-06-03 PROCEDURE — 82803 BLOOD GASES ANY COMBINATION: CPT

## 2022-06-03 PROCEDURE — 74011000250 HC RX REV CODE- 250: Performed by: EMERGENCY MEDICINE

## 2022-06-03 PROCEDURE — 93306 TTE W/DOPPLER COMPLETE: CPT

## 2022-06-03 PROCEDURE — 84145 PROCALCITONIN (PCT): CPT

## 2022-06-03 PROCEDURE — 36415 COLL VENOUS BLD VENIPUNCTURE: CPT

## 2022-06-03 PROCEDURE — 94640 AIRWAY INHALATION TREATMENT: CPT

## 2022-06-03 PROCEDURE — 83880 ASSAY OF NATRIURETIC PEPTIDE: CPT

## 2022-06-03 PROCEDURE — 85025 COMPLETE CBC W/AUTO DIFF WBC: CPT

## 2022-06-03 PROCEDURE — 85610 PROTHROMBIN TIME: CPT

## 2022-06-03 PROCEDURE — 71045 X-RAY EXAM CHEST 1 VIEW: CPT

## 2022-06-03 PROCEDURE — 83605 ASSAY OF LACTIC ACID: CPT

## 2022-06-03 PROCEDURE — 0202U NFCT DS 22 TRGT SARS-COV-2: CPT

## 2022-06-03 PROCEDURE — 74011000258 HC RX REV CODE- 258: Performed by: EMERGENCY MEDICINE

## 2022-06-03 RX ORDER — TAMSULOSIN HYDROCHLORIDE 0.4 MG/1
0.4 CAPSULE ORAL DAILY
Status: DISCONTINUED | OUTPATIENT
Start: 2022-06-03 | End: 2022-06-03 | Stop reason: HOSPADM

## 2022-06-03 RX ORDER — LISINOPRIL 5 MG/1
2.5 TABLET ORAL DAILY
Status: DISCONTINUED | OUTPATIENT
Start: 2022-06-04 | End: 2022-06-03 | Stop reason: HOSPADM

## 2022-06-03 RX ORDER — BUMETANIDE 1 MG/1
1 TABLET ORAL DAILY
Status: DISCONTINUED | OUTPATIENT
Start: 2022-06-03 | End: 2022-06-03 | Stop reason: HOSPADM

## 2022-06-03 RX ORDER — MONTELUKAST SODIUM 10 MG/1
10 TABLET ORAL DAILY
Status: DISCONTINUED | OUTPATIENT
Start: 2022-06-03 | End: 2022-06-03 | Stop reason: HOSPADM

## 2022-06-03 RX ORDER — IPRATROPIUM BROMIDE AND ALBUTEROL SULFATE 2.5; .5 MG/3ML; MG/3ML
3 SOLUTION RESPIRATORY (INHALATION)
Status: DISCONTINUED | OUTPATIENT
Start: 2022-06-03 | End: 2022-06-03 | Stop reason: HOSPADM

## 2022-06-03 RX ORDER — ENOXAPARIN SODIUM 100 MG/ML
40 INJECTION SUBCUTANEOUS DAILY
Status: DISCONTINUED | OUTPATIENT
Start: 2022-06-03 | End: 2022-06-03 | Stop reason: HOSPADM

## 2022-06-03 RX ORDER — MEMANTINE HYDROCHLORIDE 10 MG/1
10 TABLET ORAL 2 TIMES DAILY
Status: DISCONTINUED | OUTPATIENT
Start: 2022-06-03 | End: 2022-06-03 | Stop reason: HOSPADM

## 2022-06-03 RX ORDER — DONEPEZIL HYDROCHLORIDE 5 MG/1
10 TABLET, FILM COATED ORAL
Status: DISCONTINUED | OUTPATIENT
Start: 2022-06-03 | End: 2022-06-03 | Stop reason: HOSPADM

## 2022-06-03 RX ORDER — ONDANSETRON 4 MG/1
4 TABLET, ORALLY DISINTEGRATING ORAL
Status: DISCONTINUED | OUTPATIENT
Start: 2022-06-03 | End: 2022-06-03 | Stop reason: HOSPADM

## 2022-06-03 RX ORDER — ACETAMINOPHEN 650 MG/1
650 SUPPOSITORY RECTAL
Status: DISCONTINUED | OUTPATIENT
Start: 2022-06-03 | End: 2022-06-03 | Stop reason: HOSPADM

## 2022-06-03 RX ORDER — CARVEDILOL 12.5 MG/1
12.5 TABLET ORAL 2 TIMES DAILY WITH MEALS
Status: DISCONTINUED | OUTPATIENT
Start: 2022-06-03 | End: 2022-06-03 | Stop reason: HOSPADM

## 2022-06-03 RX ORDER — ARFORMOTEROL TARTRATE 15 UG/2ML
15 SOLUTION RESPIRATORY (INHALATION)
Status: DISCONTINUED | OUTPATIENT
Start: 2022-06-03 | End: 2022-06-03 | Stop reason: HOSPADM

## 2022-06-03 RX ORDER — LISINOPRIL 5 MG/1
2.5 TABLET ORAL
Status: COMPLETED | OUTPATIENT
Start: 2022-06-03 | End: 2022-06-03

## 2022-06-03 RX ORDER — PREDNISONE 20 MG/1
40 TABLET ORAL
Status: DISCONTINUED | OUTPATIENT
Start: 2022-06-04 | End: 2022-06-03 | Stop reason: HOSPADM

## 2022-06-03 RX ORDER — ONDANSETRON 2 MG/ML
4 INJECTION INTRAMUSCULAR; INTRAVENOUS
Status: DISCONTINUED | OUTPATIENT
Start: 2022-06-03 | End: 2022-06-03 | Stop reason: HOSPADM

## 2022-06-03 RX ORDER — SODIUM CHLORIDE 0.9 % (FLUSH) 0.9 %
5-40 SYRINGE (ML) INJECTION EVERY 8 HOURS
Status: DISCONTINUED | OUTPATIENT
Start: 2022-06-03 | End: 2022-06-03 | Stop reason: HOSPADM

## 2022-06-03 RX ORDER — ACETAMINOPHEN 325 MG/1
650 TABLET ORAL
Status: DISCONTINUED | OUTPATIENT
Start: 2022-06-03 | End: 2022-06-03 | Stop reason: HOSPADM

## 2022-06-03 RX ORDER — BUDESONIDE 0.5 MG/2ML
500 INHALANT ORAL
Status: DISCONTINUED | OUTPATIENT
Start: 2022-06-03 | End: 2022-06-03 | Stop reason: HOSPADM

## 2022-06-03 RX ORDER — BUMETANIDE 0.25 MG/ML
1 INJECTION INTRAMUSCULAR; INTRAVENOUS
Status: COMPLETED | OUTPATIENT
Start: 2022-06-03 | End: 2022-06-03

## 2022-06-03 RX ORDER — PREDNISONE 10 MG/1
TABLET ORAL
Qty: 21 TABLET | Refills: 0 | Status: SHIPPED | OUTPATIENT
Start: 2022-06-03

## 2022-06-03 RX ORDER — SODIUM CHLORIDE 0.9 % (FLUSH) 0.9 %
5-40 SYRINGE (ML) INJECTION AS NEEDED
Status: DISCONTINUED | OUTPATIENT
Start: 2022-06-03 | End: 2022-06-03 | Stop reason: HOSPADM

## 2022-06-03 RX ORDER — POLYETHYLENE GLYCOL 3350 17 G/17G
17 POWDER, FOR SOLUTION ORAL DAILY PRN
Status: DISCONTINUED | OUTPATIENT
Start: 2022-06-03 | End: 2022-06-03 | Stop reason: HOSPADM

## 2022-06-03 RX ORDER — LEVOFLOXACIN 5 MG/ML
750 INJECTION, SOLUTION INTRAVENOUS EVERY 24 HOURS
Status: DISCONTINUED | OUTPATIENT
Start: 2022-06-03 | End: 2022-06-03 | Stop reason: HOSPADM

## 2022-06-03 RX ORDER — ATORVASTATIN CALCIUM 10 MG/1
10 TABLET, FILM COATED ORAL
Status: DISCONTINUED | OUTPATIENT
Start: 2022-06-03 | End: 2022-06-03 | Stop reason: HOSPADM

## 2022-06-03 RX ADMIN — MONTELUKAST 10 MG: 10 TABLET, FILM COATED ORAL at 10:40

## 2022-06-03 RX ADMIN — BUMETANIDE 1 MG: 1 TABLET ORAL at 10:40

## 2022-06-03 RX ADMIN — BUMETANIDE 1 MG: 0.25 INJECTION INTRAMUSCULAR; INTRAVENOUS at 02:26

## 2022-06-03 RX ADMIN — CARVEDILOL 12.5 MG: 6.25 TABLET, FILM COATED ORAL at 08:54

## 2022-06-03 RX ADMIN — SODIUM CHLORIDE, PRESERVATIVE FREE 10 ML: 5 INJECTION INTRAVENOUS at 08:56

## 2022-06-03 RX ADMIN — BUDESONIDE 500 MCG: 0.5 SUSPENSION RESPIRATORY (INHALATION) at 12:07

## 2022-06-03 RX ADMIN — PIPERACILLIN AND TAZOBACTAM 4.5 G: 4; .5 INJECTION, POWDER, LYOPHILIZED, FOR SOLUTION INTRAVENOUS at 06:48

## 2022-06-03 RX ADMIN — LISINOPRIL 2.5 MG: 5 TABLET ORAL at 02:28

## 2022-06-03 RX ADMIN — IPRATROPIUM BROMIDE AND ALBUTEROL SULFATE 3 ML: .5; 3 SOLUTION RESPIRATORY (INHALATION) at 12:03

## 2022-06-03 RX ADMIN — TAMSULOSIN HYDROCHLORIDE 0.4 MG: 0.4 CAPSULE ORAL at 10:40

## 2022-06-03 RX ADMIN — ENOXAPARIN SODIUM 40 MG: 100 INJECTION SUBCUTANEOUS at 10:41

## 2022-06-03 RX ADMIN — IPRATROPIUM BROMIDE AND ALBUTEROL SULFATE 6 ML: .5; 3 SOLUTION RESPIRATORY (INHALATION) at 00:07

## 2022-06-03 RX ADMIN — MEMANTINE HYDROCHLORIDE 10 MG: 10 TABLET ORAL at 10:40

## 2022-06-03 RX ADMIN — SODIUM CHLORIDE, PRESERVATIVE FREE 10 ML: 5 INJECTION INTRAVENOUS at 13:50

## 2022-06-03 RX ADMIN — METHYLPREDNISOLONE SODIUM SUCCINATE 60 MG: 125 INJECTION, POWDER, FOR SOLUTION INTRAMUSCULAR; INTRAVENOUS at 00:20

## 2022-06-03 RX ADMIN — ARFORMOTEROL TARTRATE 15 MCG: 15 SOLUTION RESPIRATORY (INHALATION) at 12:03

## 2022-06-03 NOTE — ED TRIAGE NOTES
Normally on 2l of O2 at home. Became lethargic, coughing and having sob at home. Did a neb tx and it seemed to increase his O2 to 84 to 85% form the low 80's.

## 2022-06-03 NOTE — ED NOTES
Talked with Granddaughter of Pt Gosia Wang about pt care. PT will be admitted and transferred to [facility] via [facility]. Awaiting ETA.

## 2022-06-03 NOTE — PROGRESS NOTES
OCCUPATIONAL THERAPY EVALUATION/DISCHARGE  Patient: Beverlie Gosselin (35 y.o. male)  Date: 6/3/2022  Primary Diagnosis: CHF (congestive heart failure) ([facility] [I50.9]  Chronic hypoxemic respiratory failure (HCC) [J96.11]       Precautions:        ASSESSMENT  Based on the objective data described below, the patient presents with baseline ADL performance using 2L/min (baseline O2 setting) and O2 at 97%. Good balance in stand during ADL tasks, safe in room mobility noted without assistive device. Patient reports he is feeling much better. Patient does have baseline memory impairment though lives in basement apartment of grand daughter's home. He has assistance as needed with IADL tasks (completes ADL with independence). No OT services indicated at this time- completing order      Current Level of Function (ADLs/self-care): independent    Functional Outcome Measure: The patient scored 90/100 on the Barthel Index outcome measure     Other factors to consider for discharge:      PLAN :  Recommend with staff: encourage OOB activity to reduce risk of deconditioning    Recommendation for discharge: (in order for the patient to meet his/her long term goals)  No skilled occupational therapy/ follow up rehabilitation needs identified at this time.     This discharge recommendation:  Has been made in collaboration with the attending provider and/or case management    IF patient discharges home will need the following DME: none       SUBJECTIVE:   Patient pleasant and cooperative    OBJECTIVE DATA SUMMARY:   HISTORY:   Past Medical History:   Diagnosis Date    BPH (benign prostatic hyperplasia)     Chronic hypoxemic respiratory failure ([facility]     COPD (chronic obstructive pulmonary disease) ([facility]     Dementia ([facility]     HTN (hypertension)     Hyperlipidemia     Macular degeneration of right eye     Obese      Past Surgical History:   Procedure Laterality Date    HX PROSTATECTOMY         Prior Level of Function/Environment/Context: Patient does have baseline memory impairment though lives in basement apartment of grand daughter's home. He has assistance as needed with IADL tasks (completes ADL with independence). Uses 2L/min O2   Expanded or extensive additional review of patient history:   Home Situation  Home Environment: Private residence (basement apartment in granddgtr's home)  # Steps to Enter: 2  One/Two Story Residence: Two story  # of Interior Steps: 10  Interior Rails: Right  Living Alone: No  Support Systems: Other Family Member(s)  Patient Expects to be Discharged to[de-identified] Home  Current DME Used/Available at Home: Oxygen, portable    Hand dominance: Right    EXAMINATION OF PERFORMANCE DEFICITS:  Cognitive/Behavioral Status:  Neurologic State: Alert  Orientation Level: Oriented to person;Oriented to situation  Cognition: Follows commands; Appropriate decision making; Appropriate safety awareness             Skin: intact    Edema:     Hearing: Auditory  Auditory Impairment: None    Vision/Perceptual:                                     Range of Motion:    AROM: Generally decreased, functional                         Strength:    Strength: Generally decreased, functional                Coordination:  Coordination: Generally decreased, functional  Fine Motor Skills-Upper: Left Intact; Right Intact    Gross Motor Skills-Upper: Left Intact; Right Intact    Tone & Sensation:  Tone: Normal  Sensation: Intact                      Balance:  Sitting: Intact; Without support  Standing: Intact; Without support    Functional Mobility and Transfers for ADLs:  Bed Mobility:  Supine to Sit: Independent  Sit to Supine: Independent  Scooting: Independent    Transfers:  Sit to Stand: Independent  Stand to Sit: Independent  Bed to Chair: Independent  Assistive Device :  (none)    ADL Assessment:  Feeding: Independent    Oral Facial Hygiene/Grooming: Independent    Bathing: Independent    Upper Body Dressing: Independent    Lower Body Dressing: Independent    Toileting: Independent                ADL Intervention and task modifications:                    Functional Measure:    Barthel Index:  Bathin  Bladder: 10  Bowels: 10  Groomin  Dressing: 10  Feeding: 10  Mobility: 10  Stairs: 5  Toilet Use: 10  Transfer (Bed to Chair and Back): 15  Total: 90/100      The Barthel ADL Index: Guidelines  1. The index should be used as a record of what a patient does, not as a record of what a patient could do. 2. The main aim is to establish degree of independence from any help, physical or verbal, however minor and for whatever reason. 3. The need for supervision renders the patient not independent. 4. A patient's performance should be established using the best available evidence. Asking the patient, friends/relatives and nurses are the usual sources, but direct observation and common sense are also important. However direct testing is not needed. 5. Usually the patient's performance over the preceding 24-48 hours is important, but occasionally longer periods will be relevant. 6. Middle categories imply that the patient supplies over 50 per cent of the effort. 7. Use of aids to be independent is allowed. Score Interpretation (from [address] [address])    Independent   60-79 Minimally independent   40-59 Partially dependent   20-39 Very dependent   <20 Totally dependent     -Spencer Wynn., Barthel, D.W. (1965). Functional evaluation: the Barthel Index. [facility] ([address] (1997). The Barthel activities of daily living index: self-reporting versus actual performance in the old (> or = 75 years). Journal of [address] [place] [address] 45(7), 14 [facility], LILY, Courtney Spear.Lizet (1999). Measuring the change in disability after inpatient rehabilitation; comparison of the responsiveness of the Barthel Index and Functional [place] Measure.  Journal of Neurology, Neurosurgery, and Psychiatry, 664), 892-163. KOJO Webster, EDWIN Lara, & Mc Santiago M.A. (2004) Assessment of post-stroke quality of life in cost-effectiveness studies: The usefulness of the Barthel Index and the EuroQoL-5D. Quality of Life Research, 15, 152-97         Occupational Therapy Evaluation Charge Determination   History Examination Decision-Making   LOW Complexity : Brief history review  LOW Complexity : 1-3 performance deficits relating to physical, cognitive , or psychosocial skils that result in activity limitations and / or participation restrictions  LOW Complexity : No comorbidities that affect functional and no verbal or physical assistance needed to complete eval tasks       Based on the above components, the patient evaluation is determined to be of the following complexity level: LOW   Pain Rating:  none    Activity Tolerance:   Good    After treatment patient left in no apparent distress:    Supine in bed and Call bell within reach    COMMUNICATION/EDUCATION:   The patients plan of care was discussed with: Registered nurse.      Thank you for this referral.  Silverio Arias OT  Time Calculation: 20 mins

## 2022-06-03 NOTE — ED PROVIDER NOTES
80-year-old male presenting to the ER with report of worsening shortness of breath. Patient has significant history of COPD on 2 L nasal cannula and history of CHF taking Bumex 1 mg. Patient reports wet cough started yesterday and worsened spoke with his doctor started on doxycycline. Patient having worsening shortness of breath. Patient does endorse some lower leg swelling and worsening shortness of breath with laying supine. Patient's oxygen saturation was 84 to 85% on his normal 2 L home oxygen. Family tried giving him a nebulizer treatment which did not improve his symptoms. Denies any chest pain. No dizziness lightheadedness no myalgias. No nausea or vomiting. Past Medical History:   Diagnosis Date    COPD (chronic obstructive pulmonary disease) (HCC)     Macular degeneration of right eye        Past Surgical History:   Procedure Laterality Date    HX PROSTATECTOMY           Family History:   Problem Relation Age of Onset    Cancer Mother         Intestional    Stroke Father        Social History     Socioeconomic History    Marital status:      Spouse name: Not on file    Number of children: Not on file    Years of education: Not on file    Highest education level: Not on file   Occupational History    Not on file   Tobacco Use    Smoking status: Never Smoker    Smokeless tobacco: Never Used   Substance and Sexual Activity    Alcohol use: No    Drug use: No    Sexual activity: Not on file   Other Topics Concern    Not on file   Social History Narrative    Not on file     Social Determinants of Health     Financial Resource Strain:     Difficulty of Paying Living Expenses: Not on file   Food Insecurity:     Worried About Running Out of Food in the Last Year: Not on file    Nilesh of Food in the Last Year: Not on file   Transportation Needs:     Lack of Transportation (Medical): Not on file    Lack of Transportation (Non-Medical):  Not on file   Physical Activity:  Days of Exercise per Week: Not on file    Minutes of Exercise per Session: Not on file   Stress:     Feeling of Stress : Not on file   Social Connections:     Frequency of Communication with Friends and Family: Not on file    Frequency of Social Gatherings with Friends and Family: Not on file    Attends Hoahaoism Services: Not on file    Active Member of [address] [place] [address] [employer] or Organizations: Not on file    Attends Club or Organization Meetings: Not on file    Marital Status: Not on file   Intimate Partner Violence:     Fear of Current or Ex-Partner: Not on file    Emotionally Abused: Not on file    Physically Abused: Not on file    Sexually Abused: Not on file   Housing Stability:     Unable to Pay for Housing in the Last Year: Not on file    Number of [place] in the Last Year: Not on file    Unstable Housing in the Last Year: Not on file         ALLERGIES: Patient has no known allergies. Review of Systems   Constitutional: Positive for fatigue. Negative for chills and fever. HENT: Negative for congestion and sore throat. Eyes: Negative for pain. Respiratory: Positive for cough and shortness of breath. Cardiovascular: Positive for leg swelling. Negative for chest pain. Gastrointestinal: Negative for abdominal pain, diarrhea, nausea and vomiting. Genitourinary: Negative for dysuria and flank pain. Musculoskeletal: Negative for back pain and neck pain. Skin: Negative for rash. Neurological: Negative for dizziness and headaches. Vitals:    06/02/22 2356   BP: (!) 166/84   Pulse: 88   Resp: 19   Temp: 99.8 °F (37.7 °C)   SpO2: 93%   Weight: 80.5 kg (177 lb 7.5 oz)   Height: 5' 5\" (1.651 m)            Physical Exam  Vitals and nursing note reviewed. Constitutional:       Appearance: He is well-developed. HENT:      Head: Normocephalic. Eyes:      Conjunctiva/sclera: Conjunctivae normal.   Cardiovascular:      Rate and Rhythm: Normal rate and regular rhythm.    Pulmonary: Effort: Pulmonary effort is normal. Tachypnea present. No respiratory distress. Breath sounds: Rhonchi and rales present. No wheezing. Abdominal:      General: Bowel sounds are normal.      Palpations: Abdomen is soft. Tenderness: There is no abdominal tenderness. Musculoskeletal:         General: Normal range of motion. Cervical back: Normal range of motion and neck supple. Right lower leg: Edema present. Left lower leg: Edema present. Skin:     General: Skin is warm. Capillary Refill: Capillary refill takes less than 2 seconds. Findings: No rash. Neurological:      Mental Status: He is alert and oriented to person, place, and time. Comments: No gross motor or sensory deficits          MDM  Number of Diagnoses or Management Options  COPD exacerbation ([facility]  Dementia without behavioral disturbance, unspecified dementia type ([facility]  Hypertension, unspecified type  Pneumonia of both lower lobes due to infectious organism  SOB (shortness of breath)  Diagnosis management comments: Patient presenting ER hypotensive and had increased respiratory rate requiring increased oxygen requirement from his baseline. History of COPD but also has history of CHF on Bumex. Nebulizer at home did not improve his symptoms was started on doxycycline earlier in the day. No reported any fevers. Having some orthopnea and lower leg swelling. Chest x-ray showing pulmonary edema  Due to patient's increased oxygen requirements and increased work of breathing will admit for CHF exacerbation. Ordered Bumex and lisinopril for his blood pressure. Patient did receive nebulizer treatment and steroids for his history of COPD and additional thought of possible COPD exacerbation. This may be playing a role in combination of the CHF. Patient waiting for bed to be transferred to [facility]. Continue to reassess patient. Patient does not have a fever but temperature elevated.   Elevated white count. Patient's proBNP does not seem consistent with CHF despite initial read of chest x-ray showing pulmonary edema. No significant improvement despite diuresis. Patient continues to cough. Ordered CAT scan which showing signs concerning for bilateral lower lung pneumonia versus aspiration. Ordered lactate and cultures. Ordered broad-spectrum antibiotics, Zosyn to help cover for possible aspiration. We will continue admission process  Total critical care time spent exclusive of procedures:  30min         Amount and/or Complexity of Data Reviewed  Clinical lab tests: reviewed  Tests in the radiology section of CPT®: reviewed  Tests in the medicine section of CPT®: reviewed      ED Course as of 06/03/22 0210 Fri Jun 03, 2022 0046 12:46 AM  EKG: NSR. Rate 89. Normal intervals, normal axis, no ST-elevations or depressions. No signs of ischemia. Interpreted by Kingston Turner MD       [ZD]   9286 COVID-19 rapid test: Not detected [ZD]      ED Course User Index  [ZD] Hans Rizvi MD       Procedures    Recent Results (from the past 24 hour(s))   CBC WITH AUTOMATED DIFF    Collection Time: 06/03/22 12:06 AM   Result Value Ref Range    WBC 13.8 (H) 4.1 - 11.1 K/uL    RBC 4.32 4.10 - 5.70 M/uL    HGB 12.1 12.1 - 17.0 g/dL    HCT 37.7 36.6 - 50.3 %    MCV 87.3 80.0 - 99.0 FL    MCH 28.0 26.0 - 34.0 PG    MCHC 32.1 30.0 - 36.5 g/dL    RDW 14.6 (H) 11.5 - 14.5 %    PLATELET 451 054 - 953 K/uL    MPV 11.5 8.9 - 12.9 FL    NRBC 0.0 0  WBC    ABSOLUTE NRBC 0.00 0.00 - 0.01 K/uL    NEUTROPHILS 74 %    BAND NEUTROPHILS 3 0 - 6 %    LYMPHOCYTES 7 %    MONOCYTES 16 %    EOSINOPHILS 0 %    BASOPHILS 0 %    IMMATURE GRANULOCYTES 0 %    ABS. NEUTROPHILS 10.6 K/UL    ABS. LYMPHOCYTES 1.0 K/UL    ABS. MONOCYTES 2.2 K/UL    ABS. EOSINOPHILS 0.0 K/UL    ABS. BASOPHILS 0.0 K/UL    ABS. IMM.  GRANS. 0.0 K/UL    DF AUTOMATED      RBC COMMENTS ANISOCYTOSIS  1+       METABOLIC PANEL, COMPREHENSIVE    Collection Time: 06/03/22 12:06 AM   Result Value Ref Range    Sodium 135 (L) 136 - 145 mmol/L    Potassium 4.1 3.5 - 5.1 mmol/L    Chloride 97 (L) 98 - 107 mmol/L    CO2 26 22 - 29 mmol/L    Anion gap 12 5 - 15 mmol/L    Glucose 149 (H) 65 - 100 mg/dL    BUN 17 8 - 23 MG/DL    Creatinine 0.87 0.70 - 1.20 MG/DL    BUN/Creatinine ratio 20 12 - 20      GFR est AA >60 >60 ml/min/1.73m2    GFR est non-AA >60 >60 ml/min/1.73m2    Calcium 9.1 8.8 - 10.2 MG/DL    Bilirubin, total 0.9 0.2 - 1.0 MG/DL    ALT (SGPT) 14 10 - 50 U/L    AST (SGOT) 22 10 - 50 U/L    Alk. phosphatase 32 (L) 40 - 129 U/L    Protein, total 7.4 6.4 - 8.3 g/dL    Albumin 3.9 3.5 - 5.2 g/dL    Globulin 3.5 2.0 - 4.0 g/dL    A-G Ratio 1.1 1.1 - 2.2     NT-PRO BNP    Collection Time: 06/03/22 12:06 AM   Result Value Ref Range    NT pro- (H) <451 PG/ML   COVID-19 RAPID TEST    Collection Time: 06/03/22 12:06 AM   Result Value Ref Range    Specimen source Nasopharyngeal      COVID-19 rapid test Not detected NOTD     EKG, 12 LEAD, INITIAL    Collection Time: 06/03/22 12:37 AM   Result Value Ref Range    Ventricular Rate 89 BPM    Atrial Rate 89 BPM    P-R Interval 132 ms    QRS Duration 88 ms    Q-T Interval 332 ms    QTC Calculation (Bezet) 403 ms    Calculated R Axis 20 degrees    Calculated T Axis 82 degrees    Diagnosis       Normal sinus rhythm  Normal ECG  No previous ECGs available     POC TROPONIN-I    Collection Time: 06/03/22  1:13 AM   Result Value Ref Range    Troponin-I (POC) <0.04 0.00 - 0.08 ng/mL   POC VENOUS BLOOD GAS    Collection Time: 06/03/22  1:19 AM   Result Value Ref Range    pH, venous (POC) 7.39 7.32 - 7.42      pCO2, venous (POC) 41.3 41 - 51 MMHG    pO2, venous (POC) 58 (H) 25 - 40 mmHg    HCO3, venous (POC) 24.9 23.0 - 28.0 MMOL/L    sO2, venous (POC) 89.1 (H) 65 - 88 %    Base deficit, venous (POC) 0.2 mmol/L    Specimen type (POC) VENOUS BLOOD      Performed by Abhinav Alcantara        No results found.   Preliminary read of chest x-ray during some downtime was mild pulmonary edema and effusions             Perfect Serve Consult for Admission  2:26 AM    ED Room Number: C10/C10  Patient Name and age:  Risa Dalal 80 y.o.  male  Working Diagnosis:   1. Acute on chronic congestive heart failure, unspecified heart failure type ([facility]    2. SOB (shortness of breath)    3. COPD exacerbation ([facility]    4. Hypertension, unspecified type    5. Dementia without behavioral disturbance, unspecified dementia type ([facility]        COVID-19 Suspicion:  no  Sepsis present:  no  Reassessment needed: no  Code Status:  Full Code  Readmission: no  Isolation Requirements:  no  Recommended Level of Care:  telemetry  Department: [facility]  Other:  4LNC with SpO2-94%, baseline O2 2LNC, tachypneic. Will diuresis.  Not needing BiPAP

## 2022-06-03 NOTE — PROGRESS NOTES
Medicare Outpatient Observation Notice (MOON)/ [place] Outpatient Observation Notice ([employer]) provided to patient/representative with verbal explanation of the notice. Time allotted for questions regarding the notice. Patient /representative provided a completed copy of the MOON/VOON notice. Copy placed on bedside chart.   Anthony Sousas CMS

## 2022-06-03 NOTE — ED NOTES
TRANSFER - OUT REPORT:    Verbal report given to [address] RN(name) on [employer]  being transferred to [facility] 4th floor med surg Rm 423(unit) for routine progression of care       Report consisted of patients Situation, Background, Assessment and   Recommendations(SBAR). Information from the following report(s) SBAR, ED Summary, [facility] and Recent Results was reviewed with the receiving nurse. Lines:   Peripheral IV 06/03/22 Posterior;Right Hand (Active)        Opportunity for questions and clarification was provided.       Patient transported with: [facility], O2 @ 3 liters

## 2022-06-03 NOTE — ED NOTES
ANA LAURA and POA Grand-daughter [phone]  Alternate contact her  Britney Rashid- [phone]

## 2022-06-03 NOTE — ED NOTES
Bedside shift change report given to [address] (oncoming nurse) by Iker Cristobal RN (offgoing nurse). Report included the following information SBAR, ED Summary, MAR and Recent Results.

## 2022-06-03 NOTE — H&P
[address]  [phone]    Hospitalist Admission Note      NAME:  Gurdeep Nation   :   1934   MRN:  [id number]     PCP:  Steve Garcia NP     Date/Time of service:  6/3/2022 10:13 AM          Subjective:     CHIEF COMPLAINT: dyspnea     HISTORY OF PRESENT ILLNESS:     Mr. Jaimee Sy is a 80 y.o.  male who presented to the Emergency Department complaining of dyspnea. Worse yesterday, will LE edema. Recent Dx of URI with Rx doxycycline. Hx of chronic hypoxia and COPD on 2L oxygen at home. Oxygen sats dropped at home. ER workup is mostly unremarkable. By the time he was transported to [facility] he feels at baseline and his sats are good on his usual 2L. pBNP low end. CT with bibasilar haziness, perhaps edema, but consolidation. Not septic. We will admit him for observation. Past Medical History:   Diagnosis Date    BPH (benign prostatic hyperplasia)     Chronic hypoxemic respiratory failure (HCC)     COPD (chronic obstructive pulmonary disease) (HCC)     Dementia (HCC)     HTN (hypertension)     Hyperlipidemia     Macular degeneration of right eye     Obese         Past Surgical History:   Procedure Laterality Date    HX PROSTATECTOMY         Social History     Tobacco Use    Smoking status: Never Smoker    Smokeless tobacco: Never Used   Substance Use Topics    Alcohol use: No        Family History   Problem Relation Age of Onset    Cancer Mother         Intestional    Stroke Father       Family hx cannot be fully assessed, since the patient cannot provide information    No Known Allergies     Prior to Admission medications    Medication Sig Start Date End Date Taking? Authorizing Provider   doxycycline (MONODOX) 100 mg capsule Take 100 mg by mouth two (2) times a day.    Yes Kishore, MD Tara   memantine (NAMENDA) 10 mg tablet TAKE 1 TABLET BY MOUTH TWICE DAILY 3/10/22  Yes Debbie Keller MD benzonatate (TESSALON) 100 mg capsule TK 1 C PO TID PRF COUGH 5/25/20  Yes Provider, Historical   carvedilol (COREG) 12.5 mg tablet Take  by mouth two (2) times daily (with meals). Yes Provider, Historical   bumetanide (BUMEX) 0.5 mg tablet Take  by mouth daily. Yes Provider, Historical   tamsulosin (FLOMAX) 0.4 mg capsule Take 0.4 mg by mouth daily. Yes Provider, Historical   montelukast (SINGULAIR) 10 mg tablet Take 10 mg by mouth daily. Yes Provider, Historical   lisinopril (PRINIVIL, ZESTRIL) 2.5 mg tablet Take  by mouth daily. Yes Provider, Historical   Cetirizine (ZYRTEC) 10 mg cap Take  by mouth. Yes Provider, Historical   multivitamin, tx-iron-ca-min (THERA-M W/ IRON) 9 mg iron-400 mcg tab tablet Take 1 Tab by mouth daily. Yes Provider, Historical   VIT A/VIT C/VIT E/ZINC/COPPER (PRESERVISION AREDS PO) Take  by mouth. Yes Provider, Historical   donepezil (ARICEPT) 10 mg tablet TAKE 1 TABLET BY MOUTH NIGHTLY 2/22/17  Yes Umberto Cancino MD   fluticasone-salmeterol (ADVAIR DISKUS) 500-50 mcg/dose diskus inhaler Take 1 Puff by inhalation every twelve (12) hours. Yes Provider, Historical   simvastatin (ZOCOR) 10 mg tablet Take  by mouth nightly.    Yes Provider, Historical       Review of Systems:  (bold if positive, if negative)    Gen:  fatigueEyes:  ENT:  CVS:  Pulm:  Cough, dyspneaGI:  :  MS:  Skin:  Psych:  Endo:  Hem:  Renal:  Neuro:  weakness      Objective:      VITALS:    Vital signs reviewed; most recent are:    Visit Vitals  /66   Pulse 78   Temp 99.8 °F (37.7 °C)   Resp 25   Ht 5' 5\" (1.651 m)   Wt 80.5 kg (177 lb 7.5 oz)   SpO2 94%   BMI 29.53 kg/m²     SpO2 Readings from Last 6 Encounters:   06/03/22 94%   08/11/20 98%   05/08/17 97%   03/31/16 96%   10/02/15 95%   06/18/15 95%    O2 Flow Rate (L/min): 3 l/min       Intake/Output Summary (Last 24 hours) at 6/3/2022 0739  Last data filed at 6/3/2022 0734  Gross per 24 hour   Intake 100 ml   Output --   Net 100 ml Exam:     Physical Exam:    Gen:  Well-developed, well-nourished, in no acute distress  HEENT:  Pink conjunctivae, PERRL, hearing intact to voice, moist mucous membranes  Neck:  Supple, without masses, thyroid non-tender  Resp:  No accessory muscle use, bilateral breath sounds with some wheezes  Card:  No murmurs, normal S1, S2 without thrills, bruits or peripheral edema  Abd:  Soft, non-tender, non-distended, normoactive bowel sounds are present, no mass  Lymph:  No cervical or inguinal adenopathy  Musc:  No cyanosis or clubbing  Skin:  No rashes or ulcers, skin turgor is good  Neuro:  Cranial nerves are grossly intact, general motor weakness, follows commands appropriately  Psych: Moderate insight, oriented to person, place and time, alert     Labs:    Recent Labs     06/03/22 0006   WBC 13.8*   HGB 12.1   HCT 37.7        Recent Labs     06/03/22 0006   *   K 4.1   CL 97*   CO2 26   *   BUN 17   CREA 0.87   CA 9.1   ALB 3.9   TBILI 0.9   ALT 14     No results found for: GLUCPOC  No results for input(s): PH, PCO2, PO2, HCO3, FIO2 in the last 72 hours. No results for input(s): INR, INREXT in the last 72 hours. All Micro Results     Procedure Component Value Units Date/Time    RESPIRATORY VIRUS PANEL W/COVID-19, PCR [[id number]]     Order Status: Sent Specimen: NASOPHARYNGEAL SWAB     CULTURE, BLOOD [[id number]] Collected: 06/03/22 0646    Order Status: Completed Specimen: Blood Updated: 06/03/22 0657    CULTURE, BLOOD [[id number]] Collected: 06/03/22 0638    Order Status: Completed Specimen: Blood Updated: 06/03/22 0650    COVID-19 RAPID TEST [[id number]] Collected: 06/03/22 0006    Order Status: Completed Specimen: Nasopharyngeal Updated: 06/03/22 0105     Specimen source Nasopharyngeal        COVID-19 rapid test Not detected        Comment: Rapid Abbott ID Now       Rapid NAAT:  The specimen is NEGATIVE for SARS-CoV-2, the novel coronavirus associated with COVID-19.        Negative results should be treated as presumptive and, if inconsistent with clinical signs and symptoms or necessary for patient management, should be tested with an alternative molecular assay. Negative results do not preclude SARS-CoV-2 infection and should not be used as the sole basis for patient management decisions. This test has been authorized by the FDA under an Emergency Use Authorization (EUA) for use by authorized laboratories. Fact sheet for Healthcare Providers: ConventionSpecific Mediadate.co.nz  Fact sheet for Patients: Pazien.co.nz       Methodology: Isothermal Nucleic Acid Amplification               I have reviewed previous records       Assessment and Plan:      Chronic hypoxemic respiratory failure / Leukocytosis - POA, unclear etiology. Unclear if worse than baseline. DDx URI, PNA, COPD, CHF, deconditioning, other. Oxygen as needed. Check RVP. Check procalcitonin, but he has no sepsis criteria. Treat COPD and consult pulmonary. Check ECHO. COPD (chronic obstructive pulmonary disease) - POA, severe, unclear If exacerbation. For now solumedrol, brovana, pulmicort, prn duonbs and consult pulmonary    CHF (congestive heart failure) / HTN (hypertension) / Edema, lower extremity - Unclear type or severity of CHF. Seem unlikely exacerbation. Clinically euvolemic. pBNP low end. Check ECHO. Continue usual bumex and fluid restriction. Continue coreg and lisinopril    Dementia - continue memantine    BPH (benign prostatic hyperplasia) - Continue flomax    Hyperlipidemia - continue simvastatin. Macular degeneration of right eye - Supportive care    Obese - Advise weight loss and outpatient BARB testing. Telemetry reviewed:   normal sinus rhythm    Risk of deterioration: high      Total time spent with patient: 48 Minutes I personally reviewed chart, notes, data and current medications in the medical record.   I have personally examined and treated the patient at bedside during this period. To assist coordination of care and communication with nursing and staff, this note may be preliminary early in the day, but finalized by end of the day.                  Care Plan discussed with: Patient, Care Manager, Nursing Staff, Consultant/Specialist and >50% of time spent in counseling and coordination of care    Discussed:  Care Plan       ___________________________________________________    Attending Physician: Claudette Rang, MD

## 2022-06-03 NOTE — ED NOTES
TRANSFER - OUT REPORT:    Verbal report given to Chintan (name) on Tr Eng  being transferred to [facility] bed 423 (unit) for routine progression of care       Report consisted of patients Situation, Background, Assessment and   Recommendations(SBAR). Information from the following report(s) SBAR, ED Summary and MAR was reviewed with the receiving nurse. Lines:   Peripheral IV 06/03/22 Posterior;Right Hand (Active)        Opportunity for questions and clarification was provided.       Patient transported with:   MANDEEP ALS          MANDEEP en route to [address] [place] [address]

## 2022-06-03 NOTE — CONSULTS
Name: LEVI BABCOCK [facility]: [address]   : 1934 Admit Date: 2022   Phone: [phone]  Room: 423/01   PCP: Reilly Mcdaniels NP  MRN: [id number]   Date: 6/3/2022  Code: Full Code          Chart and notes reviewed. Data reviewed. I review the patient's current medications in the medical record at each encounter. I have evaluated and examined the patient. HPI:    4:06 PM       History was obtained from patient. I was asked by Arsalan Zavala MD to see Georges Ac in consultation for a chief complaint of COPD exacerbation. History of Present Illness:  Mr. Zehn Cross is an 79 yo gentleman with COPD and chronic respiratory failure with hypoxia on 2L NC (followed by Dr. Kan Burton of [facility]) who is admitted with a COPD exacerbation. He is chronically managed on Advair and singulair. He describes about two days of increased cough that is wet and occasionally productive of sputum. He has felt more short of breath and describes as a sensation that he is unable to get in a deep breath. Symptoms did not improve with a nebulizer. Additionally noted to be hypoxic to the mid 80's on home 2L. He was started on doxy on  by his PCP. He denies any signs/symptoms of aspiration.     Labs: WBC 13.8, d-dimer 0.59, cr 0.87, RVP negative    Images reviewed:  CT chest 6/3/2022: patchy bilateral lower airspace disease, severe emphysema      Past Medical History:   Diagnosis Date    BPH (benign prostatic hyperplasia)     Chronic hypoxemic respiratory failure (HCC)     COPD (chronic obstructive pulmonary disease) (HCC)     Dementia (HCC)     HTN (hypertension)     Hyperlipidemia     Macular degeneration of right eye     Obese        Past Surgical History:   Procedure Laterality Date    HX PROSTATECTOMY         Family History   Problem Relation Age of Onset    Cancer Mother         Intestional    Stroke Father        Social History     Tobacco Use    Smoking status: Never Smoker    Smokeless tobacco: Never Used   Substance Use Topics    Alcohol use: No       No Known Allergies    Current Facility-Administered Medications   Medication Dose Route Frequency    carvediloL (COREG) tablet 12.5 mg  12.5 mg Oral BID WITH MEALS    montelukast (SINGULAIR) tablet 10 mg  10 mg Oral DAILY    memantine (NAMENDA) tablet 10 mg  10 mg Oral BID    donepeziL (ARICEPT) tablet 10 mg  10 mg Oral QHS    atorvastatin (LIPITOR) tablet 10 mg  10 mg Oral QHS    tamsulosin (FLOMAX) capsule 0.4 mg  0.4 mg Oral DAILY    budesonide (PULMICORT) 500 mcg/2 ml nebulizer suspension  500 mcg Nebulization BID RT    [START ON 6/4/2022] lisinopriL (PRINIVIL, ZESTRIL) tablet 2.5 mg  2.5 mg Oral DAILY    arformoteroL (BROVANA) neb solution 15 mcg  15 mcg Nebulization BID RT    albuterol-ipratropium (DUO-NEB) 2.5 MG-0.5 MG/3 ML  3 mL Nebulization Q6H PRN    bumetanide (BUMEX) tablet 1 mg  1 mg Oral DAILY    sodium chloride (NS) flush 5-40 mL  5-40 mL IntraVENous Q8H    sodium chloride (NS) flush 5-40 mL  5-40 mL IntraVENous PRN    acetaminophen (TYLENOL) tablet 650 mg  650 mg Oral Q6H PRN    Or    acetaminophen (TYLENOL) suppository 650 mg  650 mg Rectal Q6H PRN    polyethylene glycol (MIRALAX) packet 17 g  17 g Oral DAILY PRN    ondansetron (ZOFRAN ODT) tablet 4 mg  4 mg Oral Q8H PRN    Or    ondansetron (ZOFRAN) injection 4 mg  4 mg IntraVENous Q6H PRN    enoxaparin (LOVENOX) injection 40 mg  40 mg SubCUTAneous DAILY         REVIEW OF SYSTEMS   12 point ROS negative except as stated in the HPI. Physical Exam:   Visit Vitals  /67   Pulse 84   Temp 97.7 °F (36.5 °C)   Resp 16   Ht 5' 5\" (1.651 m)   Wt 80.3 kg (177 lb)   SpO2 97%   BMI 29.45 kg/m²       General:  Alert, cooperative, no distress, appears stated age. Head:  Normocephalic       Nose: Nares normal. Septum midline.  Mucosa normal.    Throat: Lips, mucosa, and tongue normal.    Neck: Supple, symmetrical, trachea midline Lungs:   Clear to auscultation bilaterally but diminished   Chest wall:  No tenderness or deformity. Heart:  Regular rate and rhythm, S1, S2 normal, no murmur, click, rub or gallop. Abdomen:   Soft, non-tender. Bowel sounds normal.    Extremities: Extremities normal, atraumatic, no cyanosis or edema. Pulses: 2+ and symmetric all extremities. Skin: Skin color, texture, turgor normal. No rashes or lesions       Neurologic: Grossly nonfocal       Lab Results   Component Value Date/Time    Sodium 135 (L) 06/03/2022 12:06 AM    Potassium 4.1 06/03/2022 12:06 AM    Chloride 97 (L) 06/03/2022 12:06 AM    CO2 26 06/03/2022 12:06 AM    BUN 17 06/03/2022 12:06 AM    Creatinine 0.87 06/03/2022 12:06 AM    Glucose 149 (H) 06/03/2022 12:06 AM    Calcium 9.1 06/03/2022 12:06 AM       Lab Results   Component Value Date/Time    WBC 13.8 (H) 06/03/2022 12:06 AM    HGB 12.1 06/03/2022 12:06 AM    PLATELET 682 74/86/5779 12:06 AM    MCV 87.3 06/03/2022 12:06 AM       Lab Results   Component Value Date/Time    INR 1.3 (H) 06/03/2022 07:57 AM    Alk.  phosphatase 32 (L) 06/03/2022 12:06 AM    Protein, total 7.4 06/03/2022 12:06 AM    Albumin 3.9 06/03/2022 12:06 AM    Globulin 3.5 06/03/2022 12:06 AM       No results found for: IRON, FE, TIBC, IBCT, PSAT, FERR    Lab Results   Component Value Date/Time    TSH 1.86 06/03/2022 12:06 AM        No results found for: PH, PHI, PCO2, PCO2I, PO2, PO2I, HCO3, HCO3I, FIO2, FIO2I    No results found for: CPK, RCK1, RCK2, RCK3, RCK4, CKNDX, CKND1, TROPT, TROIQ, BNPP, BNP     Lab Results   Component Value Date/Time    Culture result: NO GROWTH AFTER 7 HOURS 06/03/2022 06:46 AM    Culture result: NO GROWTH AFTER 7 HOURS 06/03/2022 06:38 AM       No results found for: TOXA1, RPR, HBCM, HBSAG, HAAB, HCAB1, HAAT, G6PD, CRYAC, HIVGT, HIVR, HIV1, HIV12, HIVPC, HIVRPI    No results found for: VANCT, CPK    No results found for: COLOR, APPRN, SPGRU, RAKESH, PROTU, GLUCU, KETU, BILU, BLDU, UROU, JANEL, LEUKU, WBCU, RBCU, UEPI, BACTU, CASTS, UCRY    IMPRESSION  · COPD exacerbation  · Abnormal CT concerning for PNA  · Chronic respiratory failure with hypoxia    PLAN  · Goal sats 88% or higher, doing well on home 2L  · Brovana/pulmicort in place of home Advair  · Singulair  · Given CT changes, needs empiric treatment with abx; discussed with pharmacy and will start levaquin, but will hold on flagyl for now given overt symptoms of aspiration  · Would benefit from steroid taper, will add PO prednisone  · Needs repeat CT in 4-6 weeks as an outpatient  · He would like to follow-up with Dr. Beny Andino, but can arrange follow-up with PAR if he decides otherwise      Thank you for allowing us to participate in the care of this patient. We will be happy to follow along in his/her progress with you.     Eben Pollard MD

## 2022-06-03 NOTE — DISCHARGE SUMMARY
Physician Discharge Summary     Patient ID:  Tr Eng  [id number]  80 y.o.  1934    Admit date: 6/2/2022    Discharge date of service and time: 6/3/2022  Greater than 30 minutes were spent providing discharge related services for this patient    Admission Diagnoses: CHF (congestive heart failure) ([facility] [I50.9]  Chronic hypoxemic respiratory failure ([facility] [J96.11]    Discharge Diagnoses:    Principal Diagnosis   Chronic hypoxemic respiratory failure [facility])                                             Hospital Course and other diagnoses  Chronic hypoxemic respiratory failure / Leukocytosis - POA, unclear etiology. Unclear if worse than baseline. DDx URI, PNA, COPD, CHF, deconditioning, other. Extra oxygen above 2L has not been needed. Negative RVP. Low procalcitonin, and he has no sepsis criteria. Treat COPD and consult pulmonary. Unremarkable ECHO. COPD (chronic obstructive pulmonary disease) - POA, severe, unclear If exacerbation. For now solumedrol, brovana, pulmicort, prn duonbs and consulted pulmonary. At home resume his Advair, nebs and Rx a prednisone dose pack     CHF (congestive heart failure) / HTN (hypertension) / Edema, lower extremity - Unclear type or severity of CHF. ECHO was normal.  Seem unlikely exacerbation. Clinically euvolemic. pBNP low end. Continue usual bumex and fluid restriction though I doubt he needs this. Continue coreg and lisinopril     Dementia - continue memantine     BPH (benign prostatic hyperplasia) - Continue flomax     Hyperlipidemia - continue simvastatin. Macular degeneration of right eye - Supportive care     Obese - Advise weight loss and outpatient BARB testing. PCP: Donaldo Hand., NP    Consults: Pulmonary/Intensive care    Significant Diagnostic Studies: See Hospital Course    Discharged home in improved condition.     Discharge Exam:  /67   Pulse 84   Temp 97.7 °F (36.5 °C)   Resp 16   Ht 5' 5\" (1.651 m)   Wt 80.3 kg (177 lb) SpO2 97%   BMI 29.45 kg/m²     Gen:  Well-developed, well-nourished, in no acute distress  HEENT:  Pink conjunctivae, PERRL, hearing intact to voice, moist mucous membranes  Neck:  Supple, without masses, thyroid non-tender  Resp:  No accessory muscle use, bilateral breath sounds with some wheezes  Card:  No murmurs, normal S1, S2 without thrills, bruits or peripheral edema  Abd:  Soft, non-tender, non-distended, normoactive bowel sounds are present, no mass  Lymph:  No cervical or inguinal adenopathy  Musc:  No cyanosis or clubbing  Skin:  No rashes or ulcers, skin turgor is good  Neuro:  Cranial nerves are grossly intact, general motor weakness, follows commands appropriately  Psych: Moderate insight, oriented to person, place and time, alert    Patient Instructions:   Current Discharge Medication List        START taking these medications    Details   predniSONE (STERAPRED DS) 10 mg dose pack See administration instruction per 10mg dose pack  Qty: 21 Tablet, Refills: 0           CONTINUE these medications which have NOT CHANGED    Details   memantine (NAMENDA) 10 mg tablet TAKE 1 TABLET BY MOUTH TWICE DAILY  Qty: 180 Tablet, Refills: 2    Associated Diagnoses: Mixed Alzheimer's and vascular dementia ([facility]      carvedilol (COREG) 12.5 mg tablet Take  by mouth two (2) times daily (with meals). bumetanide (BUMEX) 0.5 mg tablet Take  by mouth daily. tamsulosin (FLOMAX) 0.4 mg capsule Take 0.4 mg by mouth daily. montelukast (SINGULAIR) 10 mg tablet Take 10 mg by mouth daily. lisinopril (PRINIVIL, ZESTRIL) 2.5 mg tablet Take  by mouth daily. Cetirizine (ZYRTEC) 10 mg cap Take  by mouth.      multivitamin, tx-iron-ca-min (THERA-M W/ IRON) 9 mg iron-400 mcg tab tablet Take 1 Tab by mouth daily. VIT A/VIT C/VIT E/ZINC/COPPER (PRESERVISION AREDS PO) Take  by mouth.       donepezil (ARICEPT) 10 mg tablet TAKE 1 TABLET BY MOUTH NIGHTLY  Qty: 90 Tab, Refills: 5    Comments: **Patient requests 90 days supply**      fluticasone-salmeterol (ADVAIR DISKUS) 500-50 mcg/dose diskus inhaler Take 1 Puff by inhalation every twelve (12) hours. Associated Diagnoses: Memory loss      simvastatin (ZOCOR) 10 mg tablet Take  by mouth nightly. Associated Diagnoses: Memory loss      doxycycline (MONODOX) 100 mg capsule Take 100 mg by mouth two (2) times a day. benzonatate (TESSALON) 100 mg capsule TK 1 C PO TID PRF COUGH           Activity: Activity as tolerated  Diet: Cardiac Diet  Wound Care: None needed    Follow-up with your PCP in a few weeks.   Follow-up tests/labs - none    Signed:  Tico Mendes MD  6/3/2022  4:55 PM

## 2022-06-03 NOTE — DISCHARGE INSTRUCTIONS
Patient Discharge Instructions    Gurdeep Nation / [id number] : 1934    Admitted 2022 Discharged: 6/3/2022     Primary Diagnoses  Problem List as of 6/3/2022 Date Reviewed: 6/3/2022           CHF (congestive heart failure) (HCC)   Leukocytosis   * (Principal) Chronic hypoxemic respiratory failure (HCC)   BPH (benign prostatic hyperplasia)   Dementia (HCC)   Hyperlipidemia   Macular degeneration of right eye   Obese   COPD (chronic obstructive pulmonary disease) (HCC)   HTN (hypertension)   Edema, lower extremity          Take Home Medications     · It is important that you take the medication exactly as they are prescribed. · Keep your medication in the bottles provided by the pharmacist and keep a list of the medication names, dosages, and times to be taken in your wallet. · Do not take other medications without consulting your doctor. What to do at Home    Recommended diet: {diet:01839}    Recommended activity: {discharge activity:35846}    If you experience ***, please follow up with ***. Follow-up with your PCP in {0-10:33169} { day/week/month:67762}    Follow-up Information     Follow up With Specialties Details Why Contact Mamadou Armstrong., ROGER Nurse Practitioner Schedule an appointment as soon as possible for a visit in 1 week   [place],[address]  [phone]             Information obtained by :  I understand that if any problems occur once I am at home I am to contact my physician. I understand and acknowledge receipt of the instructions indicated above.                                                                                                                                            Physician's or R.N.'s Signature                                                                  Date/Time                                                                                                                                              Patient or Representative Signature                                                          Date/Time

## 2022-06-03 NOTE — PROGRESS NOTES
Problem: Falls - Risk of  Goal: *Absence of Falls  Description: Document Linseyabdon Tamraliya Fall Risk and appropriate interventions in the flowsheet.   Outcome: Progressing Towards Goal  Note: Fall Risk Interventions:            Medication Interventions: Bed/chair exit alarm                   Problem: Patient Education: Go to Patient Education Activity  Goal: Patient/Family Education  Outcome: Progressing Towards Goal

## 2022-06-06 ENCOUNTER — PATIENT OUTREACH (OUTPATIENT)
Dept: CASE MANAGEMENT | Age: 87
End: 2022-06-06

## 2022-06-06 NOTE — ACP (ADVANCE CARE PLANNING)
Advance Care Planning     General Advance Care Planning (ACP) Conversation      Date of Conversation: 6/6/2022  Conducted with: Patient with Decision Making Capacity and spoke with jassi, daughter is in the hospital as well    Healthcare Decision Maker:   No healthcare decision makers have been documented. Click here to complete [address] including selection of the Healthcare Decision Maker Relationship (ie \"Primary\")        Content/Action Overview:    Has ACP document(s) on file - reflects the patient's care preferences       Length of Voluntary ACP Conversation in minutes:  <16 minutes (Non-Billable)    Gera Umana RN

## 2022-06-06 NOTE — PROGRESS NOTES
Care Transitions Initial Call    Call within 2 business days of discharge: Yes     Patient: Barbara Ivey Patient : 1934 MRN: [id number]    Last Discharge [address]       Complaint Diagnosis Description Type Department Provider    22 Shortness of Breath; Cough; Respiratory Distress Pneumonia of both lower lobes due to infectious organism . .. ED to Hosp-Admission (Discharged) (ADMIT) Abdi An MD; Lily Orellana. .. Was this an external facility discharge? No Discharge Facility: [facility]    Challenges to be reviewed by the provider   Additional needs identified to be addressed with provider: no  none         Method of communication with provider : chart routing, none    Discussed COVID-19 related testing which was available at this time. Test results were negative. Patient informed of results, if available? yes     Advance Care Planning:   Does patient have an Advance Directive: yes, reviewed and current. Inpatient Readmission Risk score: Unplanned Readmit Risk Score: 9.2 ( )    Was this a readmission? no   Patient stated reason for the admission: cough, low sats, trouble breathing    Patients top risk factors for readmission: functional cognitive ability, functional physical ability and polypharmacy   Interventions to address risk factors: Scheduled appointment with PCP-granddaughter to schedule follow up with pcp and Obtained and reviewed discharge summary and/or continuity of care documents    Care Transition Nurse (CTN) contacted the family by telephone to perform post hospital discharge assessment. Verified name and  with family as identifiers. Provided introduction to self, and explanation of the CTN role. CTN reviewed discharge instructions, medical action plan and red flags with family who verbalized understanding. Were discharge instructions available to patient? yes.  Reviewed appropriate site of care based on symptoms and resources available to patient including: PCP. Family given an opportunity to ask questions and does not have any further questions or concerns at this time. The family agrees to contact the PCP office for questions related to their healthcare. Medication reconciliation was performed with family, who verbalizes understanding of administration of home medications. Advised obtaining a 90-day supply of all daily and as-needed medications. Referral to Pharm D needed: no     Home Health/Outpatient orders at discharge: none    Durable Medical Equipment ordered at discharge: None    Was patient discharged with a pulse oximeter? no    Discussed follow-up appointments. If no appointment was previously scheduled, appointment scheduling offered: yes. Is follow up appointment scheduled within 7 days of discharge? jassi balbuena appt. [facility] follow up appointment(s): No future appointments. Non-[facility] follow up appointment(s): none at this time. Plan for follow-up call in 5-7 days based on severity of symptoms and risk factors. Plan for next call: symptom management-antibiotic and steroid complete, oxygen levels. CTN provided contact information for future needs. Goals Addressed                 This Visit's Progress     Prevent complications post hospitalization. 6/6/22     Patient to preoxygenate to 3 liters  before activity to ensure oxygen saturations greater than 90%.  Patient to check pulse oximeter 2x a day and before and after activity   Patient to complete doxycycline and steroid.  Ctn to follow up in one week.    [id number] Rubia Thompson RN

## 2022-06-08 LAB
BACTERIA SPEC CULT: NORMAL
BACTERIA SPEC CULT: NORMAL
SERVICE CMNT-IMP: NORMAL
SERVICE CMNT-IMP: NORMAL

## 2022-06-10 NOTE — PROGRESS NOTES
Physician Progress Note      Vanna Ortiz  CSN #:                  [id number]  :                       1934  ADMIT DATE:       2022 11:35 PM  [address] [place] DATE:        6/3/2022 7:00 PM  RESPONDING  PROVIDER #:        Patience Cates MD          QUERY TEXT:    Good Morning,    This patient admitted on 2022--2022 for COPD exacerbation. On  Pulmonary was consulted. and Notes \"Abnormal CT concerning for PNA\". If possible, please document in progress notes and discharge summary if :      The medical record reflects the following:  Risk Factors: COPD, Chronic rsp. failure, Recent dx. of URI and had been on Doxycycline  Clinical Indicators: Presented to ER with increased SOB and dropped in o2 sats at home, LE edema. CT with bibasilar haziness, Consolidation. Pulmonary consulted, Started doxy on  by his PCP \"Abnormal CT concerning for Pneumonia  Treatment: Pulmonary notes \"Given CT changes, needs empiric treatment with abx. will start levaquin. Steroid taper, adding po predisone. Cont. with home o2 of 2 liters    Thank you for clarifying  [facility] ,[address] [place] [address], [address] [place] [address], [facility]  [phone]  Options provided:  -- Pneumonia was  confirmed present on admission  -- Pneumonia was ruled out  -- Other - I will add my own diagnosis  -- Disagree - Not applicable / Not valid  -- Disagree - Clinically unable to determine / Unknown  -- Refer to Clinical Documentation Reviewer    PROVIDER RESPONSE TEXT:    The diagnosis of Pneumonia was ruled out.     Query created by: Hetal Rosales on 2022 11:44 AM      Electronically signed by:  Patience Cates MD 6/10/2022 5:30 PM

## 2022-06-21 ENCOUNTER — PATIENT OUTREACH (OUTPATIENT)
Dept: CASE MANAGEMENT | Age: 87
End: 2022-06-21

## 2022-06-21 NOTE — ACP (ADVANCE CARE PLANNING)
Care Transitions Follow Up Call    Challenges to be reviewed by the provider   Additional needs identified to be addressed with provider: no  none           Method of communication with provider : none    Care Transition Nurse (CTN) contacted the family by telephone to follow up after admission on 22. Verified name and  with family as identifiers. Addressed changes since last contact: none  Follow up appointment completed? Daughter to make cardiology appt and pcp appt. .   Was follow up appointment scheduled within 7 days of discharge? daughter to make appt. ctn offered . Advance Care Planning:   Does patient have an Advance Directive:  yes; reviewed and current     CTN reviewed discharge instructions, medical action plan and red flags with family and discussed any barriers to care and/or understanding of plan of care after discharge. Discussed appropriate site of care based on symptoms and resources available to patient including: PCP and Specialist. The family agrees to contact the PCP office for questions related to their healthcare. Patients top risk factors for readmission: medical condition-chf and polypharmacy   Interventions to address risk factors: Obtained and reviewed discharge summary and/or continuity of care documents    [facility] follow up appointment(s): No future appointments. Non-[facility] follow up appointment(s): none at this time. CTN provided contact information for future needs. Plan for follow-up call in 5-7 days based on severity of symptoms and risk factors. Plan for next call: follow up appointment-pcp and cardiologist     Goals Addressed                 This Visit's Progress     Prevent complications post hospitalization. On track     22     Patient to preoxygenate to 3 liters  before activity to ensure oxygen saturations greater than 90%.  Patient to check pulse oximeter 2x a day and before and after activity   Patient to complete doxycycline and steroid.  Ctn to follow up in one week. [id number] Rubia Thompson RN    6/21/22   Patient to remain on 2 liters nc o2   Patient to continue to check pulse ox bid   Patient family to call [facility] and make appt with cardiologist for follow up   Patient to take small walks daily if weather is less than 85 degrees and with assistance   Ctn to follow up in one week.  Luci Bennett RN.    

## 2022-07-06 ENCOUNTER — PATIENT OUTREACH (OUTPATIENT)
Dept: CASE MANAGEMENT | Age: 87
End: 2022-07-06

## 2022-07-06 NOTE — PROGRESS NOTES
Patient has graduated from the Transitions of Care Coordination  program on 7/6/22. Patient/family has the ability to self-manage at this time Care management goals have been completed. Patient was not referred to the [facility] team for further management. Goals Addressed                 This Visit's Progress     COMPLETED: Prevent complications post hospitalization. On track     6/6/22     Patient to preoxygenate to 3 liters  before activity to ensure oxygen saturations greater than 90%.  Patient to check pulse oximeter 2x a day and before and after activity   Patient to complete doxycycline and steroid.  Ctn to follow up in one week. Mandi Thompson RN    6/21/22   Patient to remain on 2 liters nc o2   Patient to continue to check pulse ox bid   Patient family to call [facility] and make appt with cardiologist for follow up   Patient to take small walks daily if weather is less than 85 degrees and with assistance   Ctn to follow up in one week. Mandi Thompson RN.     7/6/22     Patient has had no readmissions for 30 days. [facility] Granddaughter is caring for patient and he is doing very well per her. He is walking daily, not sob and eating and drinking well.  Sushila Truong RN                Patient has Care Transition Nurse's contact information for any further questions, concerns, or needs. Patients upcoming visits:  No future appointments.

## 2023-01-12 DIAGNOSIS — F01.50 MIXED ALZHEIMER'S AND VASCULAR DEMENTIA (HCC): ICD-10-CM

## 2023-01-12 DIAGNOSIS — F02.80 MIXED ALZHEIMER'S AND VASCULAR DEMENTIA (HCC): ICD-10-CM

## 2023-01-12 DIAGNOSIS — G30.9 MIXED ALZHEIMER'S AND VASCULAR DEMENTIA (HCC): ICD-10-CM

## 2023-01-12 RX ORDER — MEMANTINE HYDROCHLORIDE 10 MG/1
10 TABLET ORAL 2 TIMES DAILY
Qty: 180 TABLET | Refills: 2 | OUTPATIENT
Start: 2023-01-12

## 2023-01-12 RX ORDER — DONEPEZIL HYDROCHLORIDE 10 MG/1
TABLET, FILM COATED ORAL
Qty: 90 TABLET | Refills: 5 | OUTPATIENT
Start: 2023-01-12

## 2023-01-12 NOTE — TELEPHONE ENCOUNTER
Requesting refill on:    Requested Prescriptions     Pending Prescriptions Disp Refills    memantine (NAMENDA) 10 mg tablet 180 Tablet 2     Sig: Take 1 Tablet by mouth two (2) times a day.     donepeziL (ARICEPT) 10 mg tablet 90 Tablet 5     Sig: TAKE 1 TABLET BY MOUTH NIGHTLY

## 2023-01-15 DIAGNOSIS — G30.9 MIXED ALZHEIMER'S AND VASCULAR DEMENTIA (HCC): ICD-10-CM

## 2023-01-15 DIAGNOSIS — F02.80 MIXED ALZHEIMER'S AND VASCULAR DEMENTIA (HCC): ICD-10-CM

## 2023-01-15 DIAGNOSIS — F01.50 MIXED ALZHEIMER'S AND VASCULAR DEMENTIA (HCC): ICD-10-CM

## 2023-01-16 RX ORDER — MEMANTINE HYDROCHLORIDE 10 MG/1
TABLET ORAL
Qty: 180 TABLET | Refills: 2 | Status: SHIPPED | OUTPATIENT
Start: 2023-01-16

## 2023-05-19 RX ORDER — MEMANTINE HYDROCHLORIDE 10 MG/1
1 TABLET ORAL 2 TIMES DAILY
COMMUNITY
Start: 2023-01-16

## 2023-05-19 RX ORDER — CARVEDILOL 12.5 MG/1
TABLET ORAL 2 TIMES DAILY WITH MEALS
COMMUNITY

## 2023-05-19 RX ORDER — BUMETANIDE 0.5 MG/1
TABLET ORAL DAILY
COMMUNITY

## 2023-05-19 RX ORDER — DOXYCYCLINE 100 MG/1
100 CAPSULE ORAL 2 TIMES DAILY
COMMUNITY

## 2023-05-19 RX ORDER — FLUTICASONE PROPIONATE AND SALMETEROL 500; 50 UG/1; UG/1
1 POWDER RESPIRATORY (INHALATION) EVERY 12 HOURS
COMMUNITY

## 2023-05-19 RX ORDER — LISINOPRIL 2.5 MG/1
TABLET ORAL DAILY
COMMUNITY

## 2023-05-19 RX ORDER — MONTELUKAST SODIUM 10 MG/1
10 TABLET ORAL DAILY
COMMUNITY

## 2023-05-19 RX ORDER — PREDNISONE 10 MG/1
TABLET ORAL
COMMUNITY
Start: 2022-06-03

## 2023-05-19 RX ORDER — BENZONATATE 100 MG/1
CAPSULE ORAL
COMMUNITY
Start: 2020-05-25

## 2023-05-19 RX ORDER — DONEPEZIL HYDROCHLORIDE 10 MG/1
1 TABLET, FILM COATED ORAL NIGHTLY
COMMUNITY
Start: 2017-02-22

## 2023-05-19 RX ORDER — SIMVASTATIN 10 MG
TABLET ORAL
COMMUNITY

## 2023-05-19 RX ORDER — TAMSULOSIN HYDROCHLORIDE 0.4 MG/1
0.4 CAPSULE ORAL DAILY
COMMUNITY

## 2023-09-08 ENCOUNTER — OFFICE VISIT (OUTPATIENT)
Age: 88
End: 2023-09-08
Payer: MEDICARE

## 2023-09-08 VITALS
RESPIRATION RATE: 19 BRPM | BODY MASS INDEX: 29.49 KG/M2 | HEIGHT: 65 IN | DIASTOLIC BLOOD PRESSURE: 74 MMHG | HEART RATE: 59 BPM | WEIGHT: 177 LBS | SYSTOLIC BLOOD PRESSURE: 125 MMHG | OXYGEN SATURATION: 95 %

## 2023-09-08 DIAGNOSIS — G30.9 ALZHEIMER'S DEMENTIA WITHOUT BEHAVIORAL DISTURBANCE, PSYCHOTIC DISTURBANCE, MOOD DISTURBANCE, OR ANXIETY, UNSPECIFIED DEMENTIA SEVERITY, UNSPECIFIED TIMING OF DEMENTIA ONSET (HCC): Primary | ICD-10-CM

## 2023-09-08 DIAGNOSIS — F02.80 ALZHEIMER'S DEMENTIA WITHOUT BEHAVIORAL DISTURBANCE, PSYCHOTIC DISTURBANCE, MOOD DISTURBANCE, OR ANXIETY, UNSPECIFIED DEMENTIA SEVERITY, UNSPECIFIED TIMING OF DEMENTIA ONSET (HCC): Primary | ICD-10-CM

## 2023-09-08 PROCEDURE — G8419 CALC BMI OUT NRM PARAM NOF/U: HCPCS

## 2023-09-08 PROCEDURE — G8427 DOCREV CUR MEDS BY ELIG CLIN: HCPCS

## 2023-09-08 PROCEDURE — 1123F ACP DISCUSS/DSCN MKR DOCD: CPT

## 2023-09-08 PROCEDURE — 1036F TOBACCO NON-USER: CPT

## 2023-09-08 PROCEDURE — 99215 OFFICE O/P EST HI 40 MIN: CPT

## 2023-09-08 RX ORDER — DONEPEZIL HYDROCHLORIDE 10 MG/1
10 TABLET, FILM COATED ORAL NIGHTLY
Qty: 90 TABLET | Refills: 1 | Status: SHIPPED | OUTPATIENT
Start: 2023-09-08

## 2023-09-08 RX ORDER — VIT A/VIT C/VIT E/ZINC/COPPER 2148-113
1 TABLET ORAL DAILY
COMMUNITY

## 2023-09-08 RX ORDER — MEMANTINE HYDROCHLORIDE 10 MG/1
10 TABLET ORAL 2 TIMES DAILY
Qty: 180 TABLET | Refills: 1 | Status: SHIPPED | OUTPATIENT
Start: 2023-09-08

## 2023-09-08 RX ORDER — M-VIT,TX,IRON,MINS/CALC/FOLIC 27MG-0.4MG
1 TABLET ORAL DAILY
COMMUNITY

## 2023-09-08 ASSESSMENT — PATIENT HEALTH QUESTIONNAIRE - PHQ9
SUM OF ALL RESPONSES TO PHQ9 QUESTIONS 1 & 2: 0
SUM OF ALL RESPONSES TO PHQ QUESTIONS 1-9: 0
SUM OF ALL RESPONSES TO PHQ QUESTIONS 1-9: 0
1. LITTLE INTEREST OR PLEASURE IN DOING THINGS: 0
SUM OF ALL RESPONSES TO PHQ QUESTIONS 1-9: 0
2. FEELING DOWN, DEPRESSED OR HOPELESS: 0
SUM OF ALL RESPONSES TO PHQ QUESTIONS 1-9: 0

## 2023-09-08 NOTE — PROGRESS NOTES
Chief Complaint   Patient presents with    New Patient    Memory Loss     Family reports worsening memory loss      Vitals:    09/08/23 1045   BP: 125/74   Pulse: 59   Resp: 19   SpO2: 95%

## 2023-09-08 NOTE — PROGRESS NOTES
Chief Complaint   Patient presents with    New Patient    Memory Loss     Family reports worsening memory loss       HPI    Mr Hanh Palacio is a 80year old male here for a follow up. He is a new patient for me. He has not been seen in our office for over 3 years and is considered a new patient today. Dr Roberto Conner saw him previously on 8/11/20 for Alzheimer's. He is already on Aricept and Namenda. He is here today with his daughter and granddaughter who he lives with. He has his own place in her home. He feels great. She is reporting that his short term memory is getting worse especially over the last few months. There are good days and bad days. Sometimes he feels like Arsh Appiah is walking on eggshells because he doesn't want to get in trouble. \" He wants to do more activities but its hard for him to get out. Having a hard time with not being able to do things that he use to do. Denies any falls. He walks the street daily without getting lost. Eating,  drinking and sleeping well. No hallucinations or bad dreams. Tolerating his medication without side effects. BACKGROUND  Heather Ventura is a 80 y.o. male came in for an evaluation regarding his memory disorder. He has been evaluated by me in the past but last time he was seen over 3 years ago. He suspected to have follow-up mixed type of dementia. He came in along with his granddaughter today who tells me that overall there has been a slow decline. He is now more forgetful, tends to ask the same question repeatedly. He is unable to finish projects that he starts at home and is simply not able to do certain things that he was able to do before. He is living alone but his family checks on him multiple times a day and ensures that he takes his medications and eats all his meals. He is alone at night but there are no issues with sleep and no safety concerns expressed. He does not handle money or drive anymore.   He remains independent with basic activities of daily

## 2023-11-03 DIAGNOSIS — G30.9 ALZHEIMER'S DEMENTIA WITHOUT BEHAVIORAL DISTURBANCE, PSYCHOTIC DISTURBANCE, MOOD DISTURBANCE, OR ANXIETY, UNSPECIFIED DEMENTIA SEVERITY, UNSPECIFIED TIMING OF DEMENTIA ONSET (HCC): ICD-10-CM

## 2023-11-03 DIAGNOSIS — F02.80 ALZHEIMER'S DEMENTIA WITHOUT BEHAVIORAL DISTURBANCE, PSYCHOTIC DISTURBANCE, MOOD DISTURBANCE, OR ANXIETY, UNSPECIFIED DEMENTIA SEVERITY, UNSPECIFIED TIMING OF DEMENTIA ONSET (HCC): ICD-10-CM

## 2023-11-03 RX ORDER — MEMANTINE HYDROCHLORIDE 10 MG/1
10 TABLET ORAL 2 TIMES DAILY
Qty: 180 TABLET | Refills: 1 | Status: SHIPPED | OUTPATIENT
Start: 2023-11-03

## 2024-07-15 ENCOUNTER — HOSPITAL ENCOUNTER (EMERGENCY)
Facility: HOSPITAL | Age: 89
Discharge: HOME | End: 2024-07-16
Attending: EMERGENCY MEDICINE
Payer: MEDICARE

## 2024-07-15 DIAGNOSIS — T50.901A MEDICATION OVERDOSE, ACCIDENTAL OR UNINTENTIONAL, INITIAL ENCOUNTER: Primary | ICD-10-CM

## 2024-07-15 LAB
ANION GAP BLD CALC-SCNC: 6.7 MMOL/L (ref 10–20)
BASOPHILS # BLD: 0 K/UL (ref 0–0.1)
BASOPHILS NFR BLD: 0 % (ref 0–1)
CA-I BLD-MCNC: 1.18 MMOL/L (ref 1.12–1.32)
CHLORIDE BLD-SCNC: 101 MMOL/L (ref 98–107)
CO2 BLD-SCNC: 29.3 MMOL/L (ref 21–32)
CREAT BLD-MCNC: 0.88 MG/DL (ref 0.6–1.3)
DIFFERENTIAL METHOD BLD: ABNORMAL
EOSINOPHIL # BLD: 0.3 K/UL (ref 0–0.4)
EOSINOPHIL NFR BLD: 2 % (ref 0–7)
ERYTHROCYTE [DISTWIDTH] IN BLOOD BY AUTOMATED COUNT: 14.7 % (ref 11.5–14.5)
GLUCOSE BLD-MCNC: 112 MG/DL (ref 70–110)
HCT VFR BLD AUTO: 39.7 % (ref 36.6–50.3)
HGB BLD-MCNC: 12.5 G/DL (ref 12.1–17)
IMM GRANULOCYTES # BLD AUTO: 0 K/UL
IMM GRANULOCYTES NFR BLD AUTO: 0 %
LYMPHOCYTES # BLD: 2.1 K/UL (ref 0.8–3.5)
LYMPHOCYTES NFR BLD: 16 % (ref 12–49)
MCH RBC QN AUTO: 28 PG (ref 26–34)
MCHC RBC AUTO-ENTMCNC: 31.5 G/DL (ref 30–36.5)
MCV RBC AUTO: 89 FL (ref 80–99)
MONOCYTES # BLD: 2.4 K/UL (ref 0–1)
MONOCYTES NFR BLD: 18 % (ref 5–13)
NEUTS SEG # BLD: 8.5 K/UL (ref 1.8–8)
NEUTS SEG NFR BLD: 64 % (ref 32–75)
NRBC # BLD: 0 K/UL (ref 0–0.01)
NRBC BLD-RTO: 0 PER 100 WBC
PLATELET # BLD AUTO: 218 K/UL (ref 150–400)
PMV BLD AUTO: 10.9 FL (ref 8.9–12.9)
POTASSIUM BLD-SCNC: 4.3 MMOL/L (ref 3.5–5.1)
RBC # BLD AUTO: 4.46 M/UL (ref 4.1–5.7)
RBC MORPH BLD: ABNORMAL
SERVICE CMNT-IMP: ABNORMAL
SODIUM BLD-SCNC: 137 MMOL/L (ref 136–145)
WBC # BLD AUTO: 13.3 K/UL (ref 4.1–11.1)

## 2024-07-15 PROCEDURE — 80053 COMPREHEN METABOLIC PANEL: CPT

## 2024-07-15 PROCEDURE — 85025 COMPLETE CBC W/AUTO DIFF WBC: CPT

## 2024-07-15 PROCEDURE — 93005 ELECTROCARDIOGRAM TRACING: CPT | Performed by: EMERGENCY MEDICINE

## 2024-07-15 PROCEDURE — 36415 COLL VENOUS BLD VENIPUNCTURE: CPT

## 2024-07-15 PROCEDURE — 99284 EMERGENCY DEPT VISIT MOD MDM: CPT

## 2024-07-15 PROCEDURE — 2580000003 HC RX 258: Performed by: EMERGENCY MEDICINE

## 2024-07-15 PROCEDURE — 80307 DRUG TEST PRSMV CHEM ANLYZR: CPT

## 2024-07-15 PROCEDURE — 80047 BASIC METABLC PNL IONIZED CA: CPT

## 2024-07-15 RX ORDER — 0.9 % SODIUM CHLORIDE 0.9 %
1000 INTRAVENOUS SOLUTION INTRAVENOUS ONCE
Status: COMPLETED | OUTPATIENT
Start: 2024-07-15 | End: 2024-07-15

## 2024-07-15 RX ADMIN — SODIUM CHLORIDE 1000 ML: 9 INJECTION, SOLUTION INTRAVENOUS at 21:30

## 2024-07-15 ASSESSMENT — ENCOUNTER SYMPTOMS
NAUSEA: 0
DIARRHEA: 0
SORE THROAT: 0
EYE PAIN: 0
ABDOMINAL PAIN: 0
COLOR CHANGE: 0
RHINORRHEA: 0
BACK PAIN: 0
COUGH: 0
VOMITING: 0
SHORTNESS OF BREATH: 0

## 2024-07-15 ASSESSMENT — PAIN - FUNCTIONAL ASSESSMENT: PAIN_FUNCTIONAL_ASSESSMENT: NONE - DENIES PAIN

## 2024-07-16 VITALS
TEMPERATURE: 98.5 F | RESPIRATION RATE: 22 BRPM | HEART RATE: 68 BPM | HEIGHT: 65 IN | BODY MASS INDEX: 26.66 KG/M2 | OXYGEN SATURATION: 96 % | SYSTOLIC BLOOD PRESSURE: 147 MMHG | WEIGHT: 160 LBS | DIASTOLIC BLOOD PRESSURE: 69 MMHG

## 2024-07-16 LAB
ALBUMIN SERPL-MCNC: 3.3 G/DL (ref 3.5–5)
ALBUMIN/GLOB SERPL: 0.9 (ref 1.1–2.2)
ALP SERPL-CCNC: 29 U/L (ref 45–117)
ALT SERPL-CCNC: 34 U/L (ref 12–78)
AMPHET UR QL SCN: NEGATIVE
ANION GAP SERPL CALC-SCNC: 8 MMOL/L (ref 5–15)
AST SERPL-CCNC: 24 U/L (ref 15–37)
BARBITURATES UR QL SCN: NEGATIVE
BENZODIAZ UR QL: NEGATIVE
BILIRUB SERPL-MCNC: 0.7 MG/DL (ref 0.2–1)
BUN SERPL-MCNC: 16 MG/DL (ref 6–20)
BUN/CREAT SERPL: 17 (ref 12–20)
CALCIUM SERPL-MCNC: 8.1 MG/DL (ref 8.5–10.1)
CANNABINOIDS UR QL SCN: NEGATIVE
CHLORIDE SERPL-SCNC: 104 MMOL/L (ref 97–108)
CO2 SERPL-SCNC: 25 MMOL/L (ref 21–32)
COCAINE UR QL SCN: NEGATIVE
CREAT SERPL-MCNC: 0.95 MG/DL (ref 0.7–1.3)
EKG ATRIAL RATE: 75 BPM
EKG DIAGNOSIS: NORMAL
EKG P AXIS: 88 DEGREES
EKG P-R INTERVAL: 182 MS
EKG Q-T INTERVAL: 388 MS
EKG QRS DURATION: 92 MS
EKG QTC CALCULATION (BAZETT): 433 MS
EKG R AXIS: 30 DEGREES
EKG T AXIS: 140 DEGREES
EKG VENTRICULAR RATE: 75 BPM
GLOBULIN SER CALC-MCNC: 3.8 G/DL (ref 2–4)
GLUCOSE SERPL-MCNC: 148 MG/DL (ref 65–100)
Lab: NORMAL
METHADONE UR QL: NEGATIVE
OPIATES UR QL: NEGATIVE
PCP UR QL: NEGATIVE
POTASSIUM SERPL-SCNC: 3.8 MMOL/L (ref 3.5–5.1)
PROT SERPL-MCNC: 7.1 G/DL (ref 6.4–8.2)
SODIUM SERPL-SCNC: 137 MMOL/L (ref 136–145)

## 2024-07-16 PROCEDURE — 93010 ELECTROCARDIOGRAM REPORT: CPT | Performed by: INTERNAL MEDICINE

## 2024-07-16 NOTE — ED TRIAGE NOTES
Pt in ED with granddaughter that reports that pt has dementia and accidentally took at least 2 days and 3 nights worth of medications.   Medications he took include,   - memantine, sumvastation, donepezil, tamulosin, carvedilol, montelukast, centrum, mucous relief, and preservision, D3, vitamin K and vitamin D, and probiotic.     Pt ambulatory in ED with no complaints. On 2L O2 at baseline.     Family called poison control. They sent him here.

## 2024-07-16 NOTE — ED NOTES
Care assumed from Dr. Garnett, 89-year-old male presenting with complaints of inadvertently taking multiple days of his medications at home, most concerning for antihypertensives, have observed the patient for total of 6 hours per poison center recommendations he remains asymptomatic and hypertensive at this point.  Discussed with patient and family at bedside and they are devising a new system for medication management at home, advised primary care follow-up as well, return precautions given.  Patient and family in agreement with plan.     Dmitriy Wang MD  07/16/24 0308

## 2024-07-16 NOTE — ED NOTES
Pt and family member stated understanding of discharge instructions and need to follow up with PCP as needed. No questions or concerns voiced. Pt ambulated out of department without assistance.

## 2024-07-16 NOTE — ED NOTES
Ambulated patient to restroom. Patient sat up to side of the bed for a minute due to having feelings of dizziness. Stood up slowly and ambulated patient to restroom with steady gait. Granddaughter in room at bedside.

## 2024-07-16 NOTE — ED NOTES
Pt sitting up in bed eating, alert & oriented x4. NAD. Bed in low lock position. Call bell within reach. Pt given warm blankets. Family at bedside.

## 2024-07-16 NOTE — ED NOTES
Poison Control contacted for recommendations - observation for 6-8hr, CMP, Ca, EKG  Dr Garnett made aware

## 2024-07-16 NOTE — ED PROVIDER NOTES
[facility] EMERGENCY DEPT  EMERGENCY DEPARTMENT ENCOUNTER      Pt Name: Kevan Lynne  MRN: [id number]  Birthdate 1934  Date of evaluation: 7/15/2024  Provider: Charles Garnett MD    CHIEF COMPLAINT       Chief Complaint   Patient presents with    Drug Overdose         HISTORY OF PRESENT ILLNESS   (Location/Symptom, Timing/Onset, Context/Setting, Quality, Duration, Modifying Factors, Severity)  Note limiting factors.   89-year-old male with an accidental overdose of multiple days of his regular medicines never in the pill organizer.  Took 5 extra doses of memantine, simvastatin, donezepil, carvedilol, and several multivitamins.  He has no complaints of pain or sickness of any kind.  He admits to accidentally taking the pills states there was no suicidal ideation involved in any of this.    The history is provided by the patient.         Review of External Medical Records:     Nursing Notes were reviewed.    REVIEW OF SYSTEMS    (2-9 systems for level 4, 10 or more for level 5)     Review of Systems   Constitutional:  Negative for fatigue and fever.   HENT:  Negative for ear pain, rhinorrhea and sore throat.    Eyes:  Negative for pain and visual disturbance.   Respiratory:  Negative for cough and shortness of breath.    Cardiovascular:  Negative for chest pain.   Gastrointestinal:  Negative for abdominal pain, diarrhea, nausea and vomiting.   Genitourinary:  Negative for dysuria.   Musculoskeletal:  Negative for arthralgias, back pain and joint swelling.   Skin:  Negative for color change and rash.   Neurological:  Negative for dizziness, syncope and numbness.   Psychiatric/Behavioral:  Negative for agitation, behavioral problems, confusion and decreased concentration.    All other systems reviewed and are negative.      Except as noted above the remainder of the review of systems was reviewed and negative.       PAST MEDICAL HISTORY     Past Medical History:   Diagnosis Date    BPH (benign prostatic

## 2024-09-06 ENCOUNTER — TELEPHONE (OUTPATIENT)
Age: 89
End: 2024-09-06

## 2024-09-06 DIAGNOSIS — F02.80 ALZHEIMER'S DEMENTIA WITHOUT BEHAVIORAL DISTURBANCE, PSYCHOTIC DISTURBANCE, MOOD DISTURBANCE, OR ANXIETY, UNSPECIFIED DEMENTIA SEVERITY, UNSPECIFIED TIMING OF DEMENTIA ONSET (HCC): ICD-10-CM

## 2024-09-06 DIAGNOSIS — G30.9 ALZHEIMER'S DEMENTIA WITHOUT BEHAVIORAL DISTURBANCE, PSYCHOTIC DISTURBANCE, MOOD DISTURBANCE, OR ANXIETY, UNSPECIFIED DEMENTIA SEVERITY, UNSPECIFIED TIMING OF DEMENTIA ONSET (HCC): ICD-10-CM

## 2024-09-06 RX ORDER — DONEPEZIL HYDROCHLORIDE 10 MG/1
10 TABLET, FILM COATED ORAL NIGHTLY
Qty: 90 TABLET | Refills: 1 | Status: SHIPPED | OUTPATIENT
Start: 2024-09-06

## 2024-09-06 RX ORDER — MEMANTINE HYDROCHLORIDE 10 MG/1
10 TABLET ORAL 2 TIMES DAILY
Qty: 180 TABLET | Refills: 1 | Status: SHIPPED | OUTPATIENT
Start: 2024-09-06

## 2024-09-06 NOTE — TELEPHONE ENCOUNTER
Called patient's grand daughter. Informed her that if he is doing well on the medication with no side effects, he can continue it. Requested refills. He will come on 09/09/24 with another family member per the grand daughter.     Last refill for memantine: 11/03/23 with 1 refill  Last refill for donepezil: 09/08/23 with 1 refill

## 2024-09-06 NOTE — TELEPHONE ENCOUNTER
Called patients main number on file as a reminder phone call, it belonged to this grand-daughter Nirmala.   She states during their last visit with NP they spoke about two medications: namenda and donepezil, she states that patient is doing pretty well and would like an answer on whether patient should continue the medication.     Grand-daughter will not be present for the apt as she will be traveling.   Please advise.

## 2024-09-09 ENCOUNTER — OFFICE VISIT (OUTPATIENT)
Age: 89
End: 2024-09-09
Payer: MEDICARE

## 2024-09-09 VITALS
HEIGHT: 65 IN | HEART RATE: 58 BPM | RESPIRATION RATE: 19 BRPM | WEIGHT: 160 LBS | DIASTOLIC BLOOD PRESSURE: 70 MMHG | BODY MASS INDEX: 26.66 KG/M2 | OXYGEN SATURATION: 98 % | SYSTOLIC BLOOD PRESSURE: 124 MMHG

## 2024-09-09 DIAGNOSIS — F02.B0 MODERATE ALZHEIMER'S DEMENTIA WITHOUT BEHAVIORAL DISTURBANCE, PSYCHOTIC DISTURBANCE, MOOD DISTURBANCE, OR ANXIETY, UNSPECIFIED TIMING OF DEMENTIA ONSET (HCC): ICD-10-CM

## 2024-09-09 DIAGNOSIS — G30.9 MODERATE ALZHEIMER'S DEMENTIA WITHOUT BEHAVIORAL DISTURBANCE, PSYCHOTIC DISTURBANCE, MOOD DISTURBANCE, OR ANXIETY, UNSPECIFIED TIMING OF DEMENTIA ONSET (HCC): ICD-10-CM

## 2024-09-09 PROCEDURE — 1123F ACP DISCUSS/DSCN MKR DOCD: CPT

## 2024-09-09 PROCEDURE — 99215 OFFICE O/P EST HI 40 MIN: CPT

## 2024-09-09 PROCEDURE — G8427 DOCREV CUR MEDS BY ELIG CLIN: HCPCS

## 2024-09-09 PROCEDURE — 96136 PSYCL/NRPSYC TST PHY/QHP 1ST: CPT

## 2024-09-09 PROCEDURE — 96132 NRPSYC TST EVAL PHYS/QHP 1ST: CPT

## 2024-09-09 PROCEDURE — 1036F TOBACCO NON-USER: CPT

## 2024-09-09 PROCEDURE — G8419 CALC BMI OUT NRM PARAM NOF/U: HCPCS

## 2024-09-09 ASSESSMENT — PATIENT HEALTH QUESTIONNAIRE - PHQ9
1. LITTLE INTEREST OR PLEASURE IN DOING THINGS: NOT AT ALL
SUM OF ALL RESPONSES TO PHQ QUESTIONS 1-9: 0
SUM OF ALL RESPONSES TO PHQ9 QUESTIONS 1 & 2: 0
SUM OF ALL RESPONSES TO PHQ QUESTIONS 1-9: 0
2. FEELING DOWN, DEPRESSED OR HOPELESS: NOT AT ALL

## 2024-11-02 DIAGNOSIS — G30.9 ALZHEIMER'S DEMENTIA WITHOUT BEHAVIORAL DISTURBANCE, PSYCHOTIC DISTURBANCE, MOOD DISTURBANCE, OR ANXIETY, UNSPECIFIED DEMENTIA SEVERITY, UNSPECIFIED TIMING OF DEMENTIA ONSET (HCC): ICD-10-CM

## 2024-11-02 DIAGNOSIS — F02.80 ALZHEIMER'S DEMENTIA WITHOUT BEHAVIORAL DISTURBANCE, PSYCHOTIC DISTURBANCE, MOOD DISTURBANCE, OR ANXIETY, UNSPECIFIED DEMENTIA SEVERITY, UNSPECIFIED TIMING OF DEMENTIA ONSET (HCC): ICD-10-CM

## 2024-11-04 RX ORDER — MEMANTINE HYDROCHLORIDE 10 MG/1
10 TABLET ORAL 2 TIMES DAILY
Qty: 180 TABLET | Refills: 1 | OUTPATIENT
Start: 2024-11-04

## 2025-03-10 DIAGNOSIS — G30.9 ALZHEIMER'S DEMENTIA WITHOUT BEHAVIORAL DISTURBANCE, PSYCHOTIC DISTURBANCE, MOOD DISTURBANCE, OR ANXIETY, UNSPECIFIED DEMENTIA SEVERITY, UNSPECIFIED TIMING OF DEMENTIA ONSET (HCC): ICD-10-CM

## 2025-03-10 DIAGNOSIS — F02.80 ALZHEIMER'S DEMENTIA WITHOUT BEHAVIORAL DISTURBANCE, PSYCHOTIC DISTURBANCE, MOOD DISTURBANCE, OR ANXIETY, UNSPECIFIED DEMENTIA SEVERITY, UNSPECIFIED TIMING OF DEMENTIA ONSET (HCC): ICD-10-CM

## 2025-03-10 RX ORDER — DONEPEZIL HYDROCHLORIDE 10 MG/1
10 TABLET, FILM COATED ORAL NIGHTLY
Qty: 90 TABLET | Refills: 1 | Status: SHIPPED | OUTPATIENT
Start: 2025-03-10

## 2025-04-06 ENCOUNTER — APPOINTMENT (OUTPATIENT)
Facility: HOSPITAL | Age: 89
DRG: 177 | End: 2025-04-06
Payer: MEDICARE

## 2025-04-06 ENCOUNTER — HOSPITAL ENCOUNTER (INPATIENT)
Facility: HOSPITAL | Age: 89
LOS: 8 days | Discharge: NURSING, REHAB OR LONG-TERM CARE | DRG: 177 | End: 2025-04-14
Attending: EMERGENCY MEDICINE | Admitting: HOSPITALIST
Payer: MEDICARE

## 2025-04-06 DIAGNOSIS — J44.1 COPD EXACERBATION (HCC): Primary | ICD-10-CM

## 2025-04-06 DIAGNOSIS — J96.21 ACUTE ON CHRONIC HYPOXIC RESPIRATORY FAILURE: ICD-10-CM

## 2025-04-06 DIAGNOSIS — U07.1 COVID-19: ICD-10-CM

## 2025-04-06 LAB
ABO + RH BLD: NORMAL
ALBUMIN SERPL-MCNC: 3.8 G/DL (ref 3.5–5)
ALBUMIN/GLOB SERPL: 0.9 (ref 1.1–2.2)
ALP SERPL-CCNC: 37 U/L (ref 45–117)
ALT SERPL-CCNC: 28 U/L (ref 12–78)
ANION GAP BLD CALC-SCNC: 4 (ref 10–20)
ANION GAP SERPL CALC-SCNC: 5 MMOL/L (ref 2–12)
ARTERIAL PATENCY WRIST A: ABNORMAL
AST SERPL-CCNC: 27 U/L (ref 15–37)
BASE EXCESS BLD CALC-SCNC: 6.3 MMOL/L
BASE EXCESS BLDA CALC-SCNC: 2.2 MMOL/L
BASOPHILS # BLD: 0.04 K/UL (ref 0–0.1)
BASOPHILS NFR BLD: 0.5 % (ref 0–1)
BDY SITE: ABNORMAL
BILIRUB SERPL-MCNC: 0.7 MG/DL (ref 0.2–1)
BLOOD GROUP ANTIBODIES SERPL: NORMAL
BUN SERPL-MCNC: 14 MG/DL (ref 6–20)
BUN/CREAT SERPL: 15 (ref 12–20)
CA-I BLD-MCNC: 1.21 MMOL/L (ref 1.15–1.33)
CALCIUM SERPL-MCNC: 9.1 MG/DL (ref 8.5–10.1)
CHLORIDE BLD-SCNC: 102 MMOL/L (ref 100–111)
CHLORIDE SERPL-SCNC: 103 MMOL/L (ref 97–108)
CO2 BLD-SCNC: 33 MMOL/L (ref 22–29)
CO2 SERPL-SCNC: 29 MMOL/L (ref 21–32)
COMMENT:: NORMAL
CREAT SERPL-MCNC: 0.92 MG/DL (ref 0.7–1.3)
CREAT UR-MCNC: 0.8 MG/DL (ref 0.6–1.3)
CRP SERPL-MCNC: 0.96 MG/DL (ref 0–0.3)
DIFFERENTIAL METHOD BLD: ABNORMAL
EOSINOPHIL # BLD: 0.14 K/UL (ref 0–0.4)
EOSINOPHIL NFR BLD: 1.7 % (ref 0–7)
ERYTHROCYTE [DISTWIDTH] IN BLOOD BY AUTOMATED COUNT: 19.3 % (ref 11.5–14.5)
FERRITIN SERPL-MCNC: 11 NG/ML (ref 26–388)
FIO2 ON VENT: 35 %
FLUAV RNA SPEC QL NAA+PROBE: NOT DETECTED
FLUBV RNA SPEC QL NAA+PROBE: NOT DETECTED
FOLATE SERPL-MCNC: 58.5 NG/ML (ref 5–21)
GAS FLOW.O2 SETTING OXYMISER: 6
GLOBULIN SER CALC-MCNC: 4.3 G/DL (ref 2–4)
GLUCOSE BLD STRIP.AUTO-MCNC: 141 MG/DL (ref 74–99)
GLUCOSE SERPL-MCNC: 135 MG/DL (ref 65–100)
HCO3 BLDA-SCNC: 28 MMOL/L (ref 22–26)
HCO3 BLDA-SCNC: 34 MMOL/L
HCT VFR BLD AUTO: 28.2 % (ref 36.6–50.3)
HGB BLD-MCNC: 8.1 G/DL (ref 12.1–17)
IMM GRANULOCYTES # BLD AUTO: 0.1 K/UL (ref 0–0.04)
IMM GRANULOCYTES NFR BLD AUTO: 1.2 % (ref 0–0.5)
IPAP/PIP: 16
IRON SATN MFR SERPL: 4 % (ref 20–50)
IRON SERPL-MCNC: 14 UG/DL (ref 35–150)
LACTATE BLD-SCNC: 1.66 MMOL/L (ref 0.4–2)
LYMPHOCYTES # BLD: 1.47 K/UL (ref 0.8–3.5)
LYMPHOCYTES NFR BLD: 18.4 % (ref 12–49)
MCH RBC QN AUTO: 19.9 PG (ref 26–34)
MCHC RBC AUTO-ENTMCNC: 28.7 G/DL (ref 30–36.5)
MCV RBC AUTO: 69.1 FL (ref 80–99)
MONOCYTES # BLD: 2.24 K/UL (ref 0–1)
MONOCYTES NFR BLD: 28 % (ref 5–13)
NEUTS SEG # BLD: 4.02 K/UL (ref 1.8–8)
NEUTS SEG NFR BLD: 50.2 % (ref 32–75)
NRBC # BLD: 0 K/UL (ref 0–0.01)
NRBC BLD-RTO: 0 PER 100 WBC
NT PRO BNP: 1035 PG/ML
PCO2 BLDA: 48 MMHG (ref 35–45)
PCO2 BLDV: 64.9 MMHG (ref 41–51)
PEEP RESPIRATORY: 6
PH BLDA: 7.38 (ref 7.35–7.45)
PH BLDV: 7.32 (ref 7.32–7.42)
PLATELET # BLD AUTO: 245 K/UL (ref 150–400)
PO2 BLDA: 77 MMHG (ref 80–100)
PO2 BLDV: 30 MMHG (ref 25–40)
POTASSIUM BLD-SCNC: 4.6 MMOL/L (ref 3.5–5.5)
POTASSIUM SERPL-SCNC: 4.3 MMOL/L (ref 3.5–5.1)
PROCALCITONIN SERPL-MCNC: <0.05 NG/ML
PROT SERPL-MCNC: 8.1 G/DL (ref 6.4–8.2)
RBC # BLD AUTO: 4.08 M/UL (ref 4.1–5.7)
RBC MORPH BLD: ABNORMAL
SAO2 % BLD: 49 % (ref 94–98)
SAO2 % BLD: 95 % (ref 92–97)
SAO2% DEVICE SAO2% SENSOR NAME: ABNORMAL
SARS-COV-2 RNA RESP QL NAA+PROBE: DETECTED
SODIUM BLD-SCNC: 139 MMOL/L (ref 136–145)
SODIUM SERPL-SCNC: 137 MMOL/L (ref 136–145)
SOURCE: ABNORMAL
SPECIMEN EXP DATE BLD: NORMAL
SPECIMEN HOLD: NORMAL
SPECIMEN SITE: ABNORMAL
SPECIMEN SITE: ABNORMAL
TIBC SERPL-MCNC: 394 UG/DL (ref 250–450)
TROPONIN I SERPL HS-MCNC: 17 NG/L (ref 0–76)
TROPONIN I SERPL HS-MCNC: 25 NG/L (ref 0–76)
VIT B12 SERPL-MCNC: 724 PG/ML (ref 193–986)
WBC # BLD AUTO: 8 K/UL (ref 4.1–11.1)

## 2025-04-06 PROCEDURE — 87154 CUL TYP ID BLD PTHGN 6+ TRGT: CPT

## 2025-04-06 PROCEDURE — 87636 SARSCOV2 & INF A&B AMP PRB: CPT

## 2025-04-06 PROCEDURE — 6360000002 HC RX W HCPCS: Performed by: EMERGENCY MEDICINE

## 2025-04-06 PROCEDURE — 84132 ASSAY OF SERUM POTASSIUM: CPT

## 2025-04-06 PROCEDURE — 2580000003 HC RX 258: Performed by: HOSPITALIST

## 2025-04-06 PROCEDURE — 83540 ASSAY OF IRON: CPT

## 2025-04-06 PROCEDURE — 82728 ASSAY OF FERRITIN: CPT

## 2025-04-06 PROCEDURE — 82803 BLOOD GASES ANY COMBINATION: CPT

## 2025-04-06 PROCEDURE — 82746 ASSAY OF FOLIC ACID SERUM: CPT

## 2025-04-06 PROCEDURE — 82607 VITAMIN B-12: CPT

## 2025-04-06 PROCEDURE — 2700000000 HC OXYGEN THERAPY PER DAY

## 2025-04-06 PROCEDURE — 84484 ASSAY OF TROPONIN QUANT: CPT

## 2025-04-06 PROCEDURE — 5A09457 ASSISTANCE WITH RESPIRATORY VENTILATION, 24-96 CONSECUTIVE HOURS, CONTINUOUS POSITIVE AIRWAY PRESSURE: ICD-10-PCS | Performed by: INTERNAL MEDICINE

## 2025-04-06 PROCEDURE — 2500000003 HC RX 250 WO HCPCS: Performed by: EMERGENCY MEDICINE

## 2025-04-06 PROCEDURE — 99285 EMERGENCY DEPT VISIT HI MDM: CPT

## 2025-04-06 PROCEDURE — 85025 COMPLETE CBC W/AUTO DIFF WBC: CPT

## 2025-04-06 PROCEDURE — 86901 BLOOD TYPING SEROLOGIC RH(D): CPT

## 2025-04-06 PROCEDURE — 82947 ASSAY GLUCOSE BLOOD QUANT: CPT

## 2025-04-06 PROCEDURE — 96365 THER/PROPH/DIAG IV INF INIT: CPT

## 2025-04-06 PROCEDURE — 2500000003 HC RX 250 WO HCPCS: Performed by: HOSPITALIST

## 2025-04-06 PROCEDURE — 87040 BLOOD CULTURE FOR BACTERIA: CPT

## 2025-04-06 PROCEDURE — 6370000000 HC RX 637 (ALT 250 FOR IP): Performed by: HOSPITALIST

## 2025-04-06 PROCEDURE — 86900 BLOOD TYPING SEROLOGIC ABO: CPT

## 2025-04-06 PROCEDURE — 36600 WITHDRAWAL OF ARTERIAL BLOOD: CPT

## 2025-04-06 PROCEDURE — 94640 AIRWAY INHALATION TREATMENT: CPT

## 2025-04-06 PROCEDURE — 6360000002 HC RX W HCPCS: Performed by: HOSPITALIST

## 2025-04-06 PROCEDURE — 36415 COLL VENOUS BLD VENIPUNCTURE: CPT

## 2025-04-06 PROCEDURE — 86140 C-REACTIVE PROTEIN: CPT

## 2025-04-06 PROCEDURE — 2060000000 HC ICU INTERMEDIATE R&B

## 2025-04-06 PROCEDURE — 51798 US URINE CAPACITY MEASURE: CPT

## 2025-04-06 PROCEDURE — 84145 PROCALCITONIN (PCT): CPT

## 2025-04-06 PROCEDURE — 80053 COMPREHEN METABOLIC PANEL: CPT

## 2025-04-06 PROCEDURE — 71045 X-RAY EXAM CHEST 1 VIEW: CPT

## 2025-04-06 PROCEDURE — 6370000000 HC RX 637 (ALT 250 FOR IP): Performed by: EMERGENCY MEDICINE

## 2025-04-06 PROCEDURE — 93005 ELECTROCARDIOGRAM TRACING: CPT | Performed by: EMERGENCY MEDICINE

## 2025-04-06 PROCEDURE — 86850 RBC ANTIBODY SCREEN: CPT

## 2025-04-06 PROCEDURE — 96375 TX/PRO/DX INJ NEW DRUG ADDON: CPT

## 2025-04-06 PROCEDURE — 51701 INSERT BLADDER CATHETER: CPT

## 2025-04-06 PROCEDURE — 83550 IRON BINDING TEST: CPT

## 2025-04-06 PROCEDURE — 83880 ASSAY OF NATRIURETIC PEPTIDE: CPT

## 2025-04-06 PROCEDURE — 84295 ASSAY OF SERUM SODIUM: CPT

## 2025-04-06 PROCEDURE — 94761 N-INVAS EAR/PLS OXIMETRY MLT: CPT

## 2025-04-06 PROCEDURE — 82330 ASSAY OF CALCIUM: CPT

## 2025-04-06 PROCEDURE — 94660 CPAP INITIATION&MGMT: CPT

## 2025-04-06 RX ORDER — ENOXAPARIN SODIUM 100 MG/ML
40 INJECTION SUBCUTANEOUS DAILY
Status: DISCONTINUED | OUTPATIENT
Start: 2025-04-06 | End: 2025-04-14 | Stop reason: HOSPADM

## 2025-04-06 RX ORDER — ATORVASTATIN CALCIUM 10 MG/1
10 TABLET, FILM COATED ORAL NIGHTLY
Status: DISCONTINUED | OUTPATIENT
Start: 2025-04-06 | End: 2025-04-14 | Stop reason: HOSPADM

## 2025-04-06 RX ORDER — FUROSEMIDE 10 MG/ML
40 INJECTION INTRAMUSCULAR; INTRAVENOUS ONCE
Status: COMPLETED | OUTPATIENT
Start: 2025-04-06 | End: 2025-04-06

## 2025-04-06 RX ORDER — HYDRALAZINE HYDROCHLORIDE 20 MG/ML
10 INJECTION INTRAMUSCULAR; INTRAVENOUS EVERY 6 HOURS PRN
Status: DISCONTINUED | OUTPATIENT
Start: 2025-04-06 | End: 2025-04-14 | Stop reason: HOSPADM

## 2025-04-06 RX ORDER — ONDANSETRON 4 MG/1
4 TABLET, ORALLY DISINTEGRATING ORAL EVERY 8 HOURS PRN
Status: DISCONTINUED | OUTPATIENT
Start: 2025-04-06 | End: 2025-04-14 | Stop reason: HOSPADM

## 2025-04-06 RX ORDER — MAGNESIUM SULFATE IN WATER 40 MG/ML
2000 INJECTION, SOLUTION INTRAVENOUS PRN
Status: DISCONTINUED | OUTPATIENT
Start: 2025-04-06 | End: 2025-04-14 | Stop reason: HOSPADM

## 2025-04-06 RX ORDER — SODIUM CHLORIDE 0.9 % (FLUSH) 0.9 %
5-40 SYRINGE (ML) INJECTION EVERY 12 HOURS SCHEDULED
Status: DISCONTINUED | OUTPATIENT
Start: 2025-04-06 | End: 2025-04-14 | Stop reason: HOSPADM

## 2025-04-06 RX ORDER — SODIUM CHLORIDE 0.9 % (FLUSH) 0.9 %
5-40 SYRINGE (ML) INJECTION PRN
Status: DISCONTINUED | OUTPATIENT
Start: 2025-04-06 | End: 2025-04-14 | Stop reason: HOSPADM

## 2025-04-06 RX ORDER — POLYETHYLENE GLYCOL 3350 17 G/17G
17 POWDER, FOR SOLUTION ORAL DAILY PRN
Status: DISCONTINUED | OUTPATIENT
Start: 2025-04-06 | End: 2025-04-14 | Stop reason: HOSPADM

## 2025-04-06 RX ORDER — MEMANTINE HYDROCHLORIDE 10 MG/1
10 TABLET ORAL 2 TIMES DAILY
Status: DISCONTINUED | OUTPATIENT
Start: 2025-04-06 | End: 2025-04-14 | Stop reason: HOSPADM

## 2025-04-06 RX ORDER — IPRATROPIUM BROMIDE AND ALBUTEROL SULFATE 2.5; .5 MG/3ML; MG/3ML
1 SOLUTION RESPIRATORY (INHALATION)
Status: DISCONTINUED | OUTPATIENT
Start: 2025-04-06 | End: 2025-04-07

## 2025-04-06 RX ORDER — FLUTICASONE PROPIONATE AND SALMETEROL 250; 50 UG/1; UG/1
1 POWDER RESPIRATORY (INHALATION) EVERY 12 HOURS
COMMUNITY

## 2025-04-06 RX ORDER — NIFEDIPINE 30 MG/1
30 TABLET, EXTENDED RELEASE ORAL EVERY 8 HOURS PRN
Status: DISCONTINUED | OUTPATIENT
Start: 2025-04-06 | End: 2025-04-14 | Stop reason: HOSPADM

## 2025-04-06 RX ORDER — CARVEDILOL 6.25 MG/1
6.25 TABLET ORAL 2 TIMES DAILY
Status: DISCONTINUED | OUTPATIENT
Start: 2025-04-06 | End: 2025-04-14 | Stop reason: HOSPADM

## 2025-04-06 RX ORDER — DEXAMETHASONE SODIUM PHOSPHATE 10 MG/ML
10 INJECTION, SOLUTION INTRAMUSCULAR; INTRAVENOUS EVERY 24 HOURS
Status: DISCONTINUED | OUTPATIENT
Start: 2025-04-06 | End: 2025-04-10

## 2025-04-06 RX ORDER — ACETAMINOPHEN 325 MG/1
650 TABLET ORAL EVERY 6 HOURS PRN
Status: DISCONTINUED | OUTPATIENT
Start: 2025-04-06 | End: 2025-04-14 | Stop reason: HOSPADM

## 2025-04-06 RX ORDER — NITROGLYCERIN 20 MG/100ML
5-200 INJECTION INTRAVENOUS CONTINUOUS
Status: DISCONTINUED | OUTPATIENT
Start: 2025-04-06 | End: 2025-04-06

## 2025-04-06 RX ORDER — TAMSULOSIN HYDROCHLORIDE 0.4 MG/1
0.4 CAPSULE ORAL
Status: DISCONTINUED | OUTPATIENT
Start: 2025-04-06 | End: 2025-04-14 | Stop reason: HOSPADM

## 2025-04-06 RX ORDER — DONEPEZIL HYDROCHLORIDE 5 MG/1
10 TABLET, FILM COATED ORAL NIGHTLY
Status: DISCONTINUED | OUTPATIENT
Start: 2025-04-06 | End: 2025-04-14 | Stop reason: HOSPADM

## 2025-04-06 RX ORDER — ONDANSETRON 2 MG/ML
4 INJECTION INTRAMUSCULAR; INTRAVENOUS EVERY 6 HOURS PRN
Status: DISCONTINUED | OUTPATIENT
Start: 2025-04-06 | End: 2025-04-14 | Stop reason: HOSPADM

## 2025-04-06 RX ORDER — SODIUM CHLORIDE 9 MG/ML
INJECTION, SOLUTION INTRAVENOUS PRN
Status: DISCONTINUED | OUTPATIENT
Start: 2025-04-06 | End: 2025-04-14 | Stop reason: HOSPADM

## 2025-04-06 RX ORDER — ACETAMINOPHEN 650 MG/1
650 SUPPOSITORY RECTAL EVERY 6 HOURS PRN
Status: DISCONTINUED | OUTPATIENT
Start: 2025-04-06 | End: 2025-04-14 | Stop reason: HOSPADM

## 2025-04-06 RX ORDER — POTASSIUM CHLORIDE 750 MG/1
40 TABLET, EXTENDED RELEASE ORAL PRN
Status: DISCONTINUED | OUTPATIENT
Start: 2025-04-06 | End: 2025-04-14 | Stop reason: HOSPADM

## 2025-04-06 RX ORDER — IPRATROPIUM BROMIDE AND ALBUTEROL SULFATE 2.5; .5 MG/3ML; MG/3ML
1 SOLUTION RESPIRATORY (INHALATION) ONCE
Status: COMPLETED | OUTPATIENT
Start: 2025-04-06 | End: 2025-04-06

## 2025-04-06 RX ORDER — POTASSIUM CHLORIDE 7.45 MG/ML
10 INJECTION INTRAVENOUS PRN
Status: DISCONTINUED | OUTPATIENT
Start: 2025-04-06 | End: 2025-04-14 | Stop reason: HOSPADM

## 2025-04-06 RX ORDER — TIOTROPIUM BROMIDE 18 UG/1
18 CAPSULE ORAL; RESPIRATORY (INHALATION) DAILY
COMMUNITY

## 2025-04-06 RX ADMIN — METHYLPREDNISOLONE SODIUM SUCCINATE 125 MG: 125 INJECTION, POWDER, LYOPHILIZED, FOR SOLUTION INTRAMUSCULAR; INTRAVENOUS at 15:29

## 2025-04-06 RX ADMIN — AZITHROMYCIN MONOHYDRATE 500 MG: 500 INJECTION, POWDER, LYOPHILIZED, FOR SOLUTION INTRAVENOUS at 18:41

## 2025-04-06 RX ADMIN — CEFTRIAXONE 1000 MG: 1 INJECTION, POWDER, FOR SOLUTION INTRAMUSCULAR; INTRAVENOUS at 23:40

## 2025-04-06 RX ADMIN — DEXAMETHASONE SODIUM PHOSPHATE 10 MG: 10 INJECTION, SOLUTION INTRAMUSCULAR; INTRAVENOUS at 18:31

## 2025-04-06 RX ADMIN — HYDRALAZINE HYDROCHLORIDE 10 MG: 20 INJECTION INTRAMUSCULAR; INTRAVENOUS at 19:58

## 2025-04-06 RX ADMIN — FUROSEMIDE 40 MG: 10 INJECTION, SOLUTION INTRAMUSCULAR; INTRAVENOUS at 18:31

## 2025-04-06 RX ADMIN — NITROGLYCERIN 100 MCG/MIN: 20 INJECTION INTRAVENOUS at 15:34

## 2025-04-06 RX ADMIN — IPRATROPIUM BROMIDE AND ALBUTEROL SULFATE 1 DOSE: .5; 3 SOLUTION RESPIRATORY (INHALATION) at 15:29

## 2025-04-06 RX ADMIN — IPRATROPIUM BROMIDE AND ALBUTEROL SULFATE 1 DOSE: .5; 3 SOLUTION RESPIRATORY (INHALATION) at 22:58

## 2025-04-06 RX ADMIN — IPRATROPIUM BROMIDE AND ALBUTEROL SULFATE 1 DOSE: .5; 3 SOLUTION RESPIRATORY (INHALATION) at 20:00

## 2025-04-06 ASSESSMENT — PAIN - FUNCTIONAL ASSESSMENT: PAIN_FUNCTIONAL_ASSESSMENT: NONE - DENIES PAIN

## 2025-04-06 NOTE — ED NOTES
TRANSFER - OUT REPORT:    Verbal report given to Raina on Kevan Lynne  being transferred to [facility] for routine progression of patient care       Report consisted of patient's Situation, Background, Assessment and   Recommendations(SBAR).     Information from the following report(s) Nurse Handoff Report, ED Encounter Summary, MAR, and Recent Results was reviewed with the receiving nurse.    [place] Fall Assessment:    Presents to emergency department  because of falls (Syncope, seizure, or loss of consciousness): No  Age > 70: Yes  Altered Mental Status, Intoxication with alcohol or substance confusion (Disorientation, impaired judgment, poor safety awaremess, or inability to follow instructions): Yes  Impaired Mobility: Ambulates or transfers with assistive devices or assistance; Unable to ambulate or transer.: Yes  Nursing Judgement: Yes          Lines:   Peripheral IV 04/06/25 Left Forearm (Active)        Opportunity for questions and clarification was provided.      Patient transported with:  Tech

## 2025-04-06 NOTE — ED NOTES
Patient blood pressure dropping quickly with nitro, stopped and informed provider. Provider agreed to stop and hold medication at this time.

## 2025-04-06 NOTE — H&P
Hospitalist Admission Note    NAME: Kevan Lynne   :  12/10/1934   MRN:  [id number]     Date/Time:  2025 5:17 PM    Patient PCP: Cierra Isaac MD    Please note that this dictation was completed with Live[employer], the computer voice recognition software.  Quite often unanticipated grammatical, syntax, homophones, and other interpretive errors are inadvertently transcribed by the computer software.  Please disregard these errors.  Please excuse any errors that have escaped final proofreading  ________________________________________________________________________    Given the patient's current clinical presentation, I have a high level of concern for decompensation if discharged from the emergency department.  Complex decision making was performed, which includes reviewing the patient's available past medical records, laboratory results, and x-ray films.       My assessment of this patient's clinical condition and my plan of care is as follows.    Assessment / Plan:    Acute on chronic hypoxic and hypercapnic respiratory failure, POA due to  Covid-19 infection (tested positive at home yesterday) causing  COPD exacerbation in setting of severe COPD with chronic hypoxic respiratory failure on 2L baseline, POA  --check ABG on bipap.  Will plan to keep on bipap overnight as he is tolerating it and looks comfortable  --consult pulmonology  --check procalcitonin, CRP  --empiric abx with rocephin and azithromycin for copd exac and possible superimposed bacterial pna.  --decadron 10mg IV daily, duoneb q4h, cont home wixela equivalence    Acute on Chronic diastolic chf with elevated probnp >1K  Uncontrolled HTN  --lasix 40mg IV x 1  --cont home coreg 6.25mg bid.  May need cardene drip.  Was started on nitroglycerin drip in ER 100mcg for /106 and 217/104 but BP dropped quickly down to 113/55 and nitroglycerin drip stopped.    Acute microcytic anemia, POA hgb 8.1, previously 13 .  BUN not elevated so does

## 2025-04-06 NOTE — CONSENT
Informed Consent for Blood Component Transfusion Note    I have discussed with the  granddaughter Christina Haase  the rationale for blood component transfusion; its benefits in treating or preventing fatigue, organ damage, or death; and its risk which includes mild transfusion reactions, rare risk of blood borne infection, or more serious but rare reactions. I have discussed the alternatives to transfusion, including the risk and consequences of not receiving transfusion. The  granddaughter  had an opportunity to ask questions and had agreed to proceed with transfusion of blood components.    Electronically signed by Stephanie Gray MD on 4/6/25 at 7:00 PM EDT

## 2025-04-06 NOTE — ED NOTES
Bedside and Verbal shift change report given to Salina  (oncoming nurse) by Tiffanie (offgoing nurse). Report included the following information Nurse Handoff Report, ED Encounter Summary, ED SBAR, MAR, and Recent Results.    Informed RN that patient is able to go up gave verbal report to floor able to transport once \"no fly\" time

## 2025-04-06 NOTE — ED TRIAGE NOTES
Pt arrives by ems with the c.c. of shortness of breath and testing positive for covid +; was in the 80s on baseline 2L; pt was 81% on room air, Dr Jara at bedside; respiratory at bedside at this time.

## 2025-04-06 NOTE — ED PROVIDER NOTES
disturbance, mood disturbance, or anxiety, unspecified dementia severity, unspecified timing of dementia onset (HCC)      memantine (NAMENDA) 10 MG tablet Take 1 tablet by mouth 2 times daily  Qty: 180 tablet, Refills: 1    Associated Diagnoses: Alzheimer's dementia without behavioral disturbance, psychotic disturbance, mood disturbance, or anxiety, unspecified dementia severity, unspecified timing of dementia onset (HCC)      carvedilol (COREG) 12.5 MG tablet Take 0.5 tablets by mouth 2 times daily      montelukast (SINGULAIR) 10 MG tablet Take 1 tablet by mouth daily      simvastatin (ZOCOR) 10 MG tablet Take by mouth      tamsulosin (FLOMAX) 0.4 MG capsule Take 1 capsule by mouth daily      !! Multiple Vitamins-Minerals (PRESERVISION AREDS) TABS Take 1 capsule by mouth daily      !! Multiple Vitamins-Minerals (THERAPEUTIC MULTIVITAMIN-MINERALS) tablet Take 1 tablet by mouth daily       !! - Potential duplicate medications found. Please discuss with provider.          ALLERGIES     Patient has no known allergies.    FAMILY HISTORY       Family History   Problem Relation Age of Onset    Stroke Father     Cancer Mother         Intestional          SOCIAL HISTORY       Social History     Socioeconomic History    Marital status:    Tobacco Use    Smoking status: Never    Smokeless tobacco: Never   Vaping Use    Vaping status: Never Used   Substance and Sexual Activity    Alcohol use: No    Drug use: No         PHYSICAL EXAM       ED Triage Vitals [04/06/25 1519]   BP Systolic BP Percentile Diastolic BP Percentile Temp Temp Source Pulse Respirations SpO2   (!) 226/106 -- -- 97.6 °F (36.4 °C) Axillary 87 (!) 42 98 %      Height Weight - Scale         1.651 m (5' 5\") 72.6 kg (160 lb)             Body mass index is 28.62 kg/m².    Physical Exam  Vitals and nursing note reviewed.   Constitutional:       Comments: Elderly male, tripod breathing in respiratory distress, only able to answer basic questions with

## 2025-04-07 PROBLEM — I50.33 ACUTE ON CHRONIC HEART FAILURE WITH PRESERVED EJECTION FRACTION (HFPEF) (HCC): Status: ACTIVE | Noted: 2025-04-07

## 2025-04-07 PROBLEM — D50.9 MICROCYTIC ANEMIA: Status: ACTIVE | Noted: 2025-04-07

## 2025-04-07 PROBLEM — D50.0 IRON DEFICIENCY ANEMIA DUE TO CHRONIC BLOOD LOSS: Status: ACTIVE | Noted: 2025-04-07

## 2025-04-07 PROBLEM — J12.82 PNEUMONIA DUE TO COVID-19 VIRUS: Status: ACTIVE | Noted: 2025-04-07

## 2025-04-07 PROBLEM — U07.1 PNEUMONIA DUE TO COVID-19 VIRUS: Status: ACTIVE | Noted: 2025-04-07

## 2025-04-07 LAB
ACB COMPLEX DNA BLD POS QL NAA+NON-PROBE: NOT DETECTED
ACCESSION NUMBER, LLC1M: ABNORMAL
ALBUMIN SERPL-MCNC: 3.8 G/DL (ref 3.5–5)
ALBUMIN/GLOB SERPL: 1 (ref 1.1–2.2)
ALP SERPL-CCNC: 37 U/L (ref 45–117)
ALT SERPL-CCNC: 27 U/L (ref 12–78)
ANION GAP SERPL CALC-SCNC: 9 MMOL/L (ref 2–12)
AST SERPL-CCNC: 23 U/L (ref 15–37)
B FRAGILIS DNA BLD POS QL NAA+NON-PROBE: NOT DETECTED
BASOPHILS # BLD: 0 K/UL (ref 0–0.1)
BASOPHILS NFR BLD: 0 % (ref 0–1)
BILIRUB SERPL-MCNC: 0.7 MG/DL (ref 0.2–1)
BIOFIRE TEST COMMENT: ABNORMAL
BUN SERPL-MCNC: 16 MG/DL (ref 6–20)
BUN/CREAT SERPL: 16 (ref 12–20)
C ALBICANS DNA BLD POS QL NAA+NON-PROBE: NOT DETECTED
C AURIS DNA BLD POS QL NAA+NON-PROBE: NOT DETECTED
C GATTII+NEOFOR DNA BLD POS QL NAA+N-PRB: NOT DETECTED
C GLABRATA DNA BLD POS QL NAA+NON-PROBE: NOT DETECTED
C KRUSEI DNA BLD POS QL NAA+NON-PROBE: NOT DETECTED
C PARAP DNA BLD POS QL NAA+NON-PROBE: NOT DETECTED
C TROPICLS DNA BLD POS QL NAA+NON-PROBE: NOT DETECTED
CALCIUM SERPL-MCNC: 8.8 MG/DL (ref 8.5–10.1)
CHLORIDE SERPL-SCNC: 100 MMOL/L (ref 97–108)
CO2 SERPL-SCNC: 29 MMOL/L (ref 21–32)
CREAT SERPL-MCNC: 1.02 MG/DL (ref 0.7–1.3)
DIFFERENTIAL METHOD BLD: ABNORMAL
E CLOAC COMP DNA BLD POS NAA+NON-PROBE: NOT DETECTED
E COLI DNA BLD POS QL NAA+NON-PROBE: NOT DETECTED
E FAECALIS DNA BLD POS QL NAA+NON-PROBE: NOT DETECTED
E FAECIUM DNA BLD POS QL NAA+NON-PROBE: NOT DETECTED
EKG ATRIAL RATE: 78 BPM
EKG DIAGNOSIS: NORMAL
EKG P AXIS: 86 DEGREES
EKG P-R INTERVAL: 122 MS
EKG Q-T INTERVAL: 368 MS
EKG QRS DURATION: 88 MS
EKG QTC CALCULATION (BAZETT): 419 MS
EKG R AXIS: 68 DEGREES
EKG T AXIS: 64 DEGREES
EKG VENTRICULAR RATE: 78 BPM
ENTEROBACTERALES DNA BLD POS NAA+N-PRB: NOT DETECTED
EOSINOPHIL # BLD: 0 K/UL (ref 0–0.4)
EOSINOPHIL NFR BLD: 0 % (ref 0–7)
ERYTHROCYTE [DISTWIDTH] IN BLOOD BY AUTOMATED COUNT: 19.2 % (ref 11.5–14.5)
GLOBULIN SER CALC-MCNC: 3.8 G/DL (ref 2–4)
GLUCOSE SERPL-MCNC: 158 MG/DL (ref 65–100)
GP B STREP DNA BLD POS QL NAA+NON-PROBE: NOT DETECTED
HAEM INFLU DNA BLD POS QL NAA+NON-PROBE: NOT DETECTED
HCT VFR BLD AUTO: 26.8 % (ref 36.6–50.3)
HGB BLD-MCNC: 7.8 G/DL (ref 12.1–17)
IMM GRANULOCYTES # BLD AUTO: 0 K/UL
IMM GRANULOCYTES NFR BLD AUTO: 0 %
K OXYTOCA DNA BLD POS QL NAA+NON-PROBE: NOT DETECTED
KLEBSIELLA SP DNA BLD POS QL NAA+NON-PRB: NOT DETECTED
KLEBSIELLA SP DNA BLD POS QL NAA+NON-PRB: NOT DETECTED
L MONOCYTOG DNA BLD POS QL NAA+NON-PROBE: NOT DETECTED
LYMPHOCYTES # BLD: 0.36 K/UL (ref 0.8–3.5)
LYMPHOCYTES NFR BLD: 7 % (ref 12–49)
MCH RBC QN AUTO: 19.9 PG (ref 26–34)
MCHC RBC AUTO-ENTMCNC: 29.1 G/DL (ref 30–36.5)
MCV RBC AUTO: 68.5 FL (ref 80–99)
MECA+MECC ISLT/SPM QL: NOT DETECTED
MONOCYTES # BLD: 0.15 K/UL (ref 0–1)
MONOCYTES NFR BLD: 3 % (ref 5–13)
N MEN DNA BLD POS QL NAA+NON-PROBE: NOT DETECTED
NEUTS SEG # BLD: 4.59 K/UL (ref 1.8–8)
NEUTS SEG NFR BLD: 90 % (ref 32–75)
NRBC # BLD: 0 K/UL (ref 0–0.01)
NRBC BLD-RTO: 0 PER 100 WBC
P AERUGINOSA DNA BLD POS NAA+NON-PROBE: NOT DETECTED
PLATELET # BLD AUTO: 246 K/UL (ref 150–400)
POTASSIUM SERPL-SCNC: 3.6 MMOL/L (ref 3.5–5.1)
PROT SERPL-MCNC: 7.6 G/DL (ref 6.4–8.2)
PROTEUS SP DNA BLD POS QL NAA+NON-PROBE: NOT DETECTED
RBC # BLD AUTO: 3.91 M/UL (ref 4.1–5.7)
RBC MORPH BLD: ABNORMAL
RBC MORPH BLD: ABNORMAL
RESISTANT GENE TARGETS: ABNORMAL
S AUREUS DNA BLD POS QL NAA+NON-PROBE: NOT DETECTED
S AUREUS+CONS DNA BLD POS NAA+NON-PROBE: DETECTED
S EPIDERMIDIS DNA BLD POS QL NAA+NON-PRB: DETECTED
S LUGDUNENSIS DNA BLD POS QL NAA+NON-PRB: NOT DETECTED
S MALTOPHILIA DNA BLD POS QL NAA+NON-PRB: NOT DETECTED
S MARCESCENS DNA BLD POS NAA+NON-PROBE: NOT DETECTED
S PNEUM DNA BLD POS QL NAA+NON-PROBE: NOT DETECTED
S PYO DNA BLD POS QL NAA+NON-PROBE: NOT DETECTED
SALMONELLA DNA BLD POS QL NAA+NON-PROBE: NOT DETECTED
SODIUM SERPL-SCNC: 138 MMOL/L (ref 136–145)
STREPTOCOCCUS DNA BLD POS NAA+NON-PROBE: NOT DETECTED
WBC # BLD AUTO: 5.1 K/UL (ref 4.1–11.1)

## 2025-04-07 PROCEDURE — 6370000000 HC RX 637 (ALT 250 FOR IP): Performed by: INTERNAL MEDICINE

## 2025-04-07 PROCEDURE — 2060000000 HC ICU INTERMEDIATE R&B

## 2025-04-07 PROCEDURE — 6360000002 HC RX W HCPCS: Performed by: INTERNAL MEDICINE

## 2025-04-07 PROCEDURE — 2500000003 HC RX 250 WO HCPCS: Performed by: INTERNAL MEDICINE

## 2025-04-07 PROCEDURE — 80053 COMPREHEN METABOLIC PANEL: CPT

## 2025-04-07 PROCEDURE — 6360000002 HC RX W HCPCS: Performed by: HOSPITALIST

## 2025-04-07 PROCEDURE — 94660 CPAP INITIATION&MGMT: CPT

## 2025-04-07 PROCEDURE — 51798 US URINE CAPACITY MEASURE: CPT

## 2025-04-07 PROCEDURE — 3E0333Z INTRODUCTION OF ANTI-INFLAMMATORY INTO PERIPHERAL VEIN, PERCUTANEOUS APPROACH: ICD-10-PCS | Performed by: INTERNAL MEDICINE

## 2025-04-07 PROCEDURE — 51702 INSERT TEMP BLADDER CATH: CPT

## 2025-04-07 PROCEDURE — 6370000000 HC RX 637 (ALT 250 FOR IP)

## 2025-04-07 PROCEDURE — 2580000003 HC RX 258: Performed by: HOSPITALIST

## 2025-04-07 PROCEDURE — 6370000000 HC RX 637 (ALT 250 FOR IP): Performed by: HOSPITALIST

## 2025-04-07 PROCEDURE — 94761 N-INVAS EAR/PLS OXIMETRY MLT: CPT

## 2025-04-07 PROCEDURE — 2700000000 HC OXYGEN THERAPY PER DAY

## 2025-04-07 PROCEDURE — 85025 COMPLETE CBC W/AUTO DIFF WBC: CPT

## 2025-04-07 PROCEDURE — 6360000002 HC RX W HCPCS

## 2025-04-07 PROCEDURE — 93010 ELECTROCARDIOGRAM REPORT: CPT | Performed by: SPECIALIST

## 2025-04-07 PROCEDURE — 94640 AIRWAY INHALATION TREATMENT: CPT

## 2025-04-07 PROCEDURE — 2500000003 HC RX 250 WO HCPCS: Performed by: HOSPITALIST

## 2025-04-07 RX ORDER — IPRATROPIUM BROMIDE AND ALBUTEROL SULFATE 2.5; .5 MG/3ML; MG/3ML
1 SOLUTION RESPIRATORY (INHALATION)
Status: DISCONTINUED | OUTPATIENT
Start: 2025-04-07 | End: 2025-04-07

## 2025-04-07 RX ORDER — FLUTICASONE PROPIONATE 110 UG/1
1 AEROSOL, METERED RESPIRATORY (INHALATION)
Status: DISCONTINUED | OUTPATIENT
Start: 2025-04-07 | End: 2025-04-14 | Stop reason: HOSPADM

## 2025-04-07 RX ORDER — FUROSEMIDE 10 MG/ML
20 INJECTION INTRAMUSCULAR; INTRAVENOUS ONCE
Status: COMPLETED | OUTPATIENT
Start: 2025-04-07 | End: 2025-04-07

## 2025-04-07 RX ORDER — ALBUTEROL SULFATE 90 UG/1
2 INHALANT RESPIRATORY (INHALATION)
Status: DISCONTINUED | OUTPATIENT
Start: 2025-04-07 | End: 2025-04-12

## 2025-04-07 RX ORDER — MIDAZOLAM HYDROCHLORIDE 2 MG/2ML
1 INJECTION, SOLUTION INTRAMUSCULAR; INTRAVENOUS ONCE
Status: COMPLETED | OUTPATIENT
Start: 2025-04-07 | End: 2025-04-07

## 2025-04-07 RX ORDER — ALBUTEROL SULFATE 1.25 MG/3ML
2.5 SOLUTION RESPIRATORY (INHALATION) EVERY 6 HOURS PRN
Status: DISCONTINUED | OUTPATIENT
Start: 2025-04-07 | End: 2025-04-14 | Stop reason: HOSPADM

## 2025-04-07 RX ORDER — ALBUTEROL SULFATE 90 UG/1
2 INHALANT RESPIRATORY (INHALATION)
Status: DISCONTINUED | OUTPATIENT
Start: 2025-04-07 | End: 2025-04-07

## 2025-04-07 RX ORDER — BUDESONIDE 0.5 MG/2ML
0.5 INHALANT ORAL
Status: DISCONTINUED | OUTPATIENT
Start: 2025-04-07 | End: 2025-04-07

## 2025-04-07 RX ORDER — IPRATROPIUM BROMIDE AND ALBUTEROL SULFATE 2.5; .5 MG/3ML; MG/3ML
1 SOLUTION RESPIRATORY (INHALATION) EVERY 4 HOURS PRN
Status: DISCONTINUED | OUTPATIENT
Start: 2025-04-07 | End: 2025-04-07

## 2025-04-07 RX ORDER — IPRATROPIUM BROMIDE AND ALBUTEROL SULFATE 2.5; .5 MG/3ML; MG/3ML
1 SOLUTION RESPIRATORY (INHALATION) EVERY 4 HOURS PRN
Status: CANCELLED | OUTPATIENT
Start: 2025-04-07

## 2025-04-07 RX ORDER — FUROSEMIDE 10 MG/ML
20 INJECTION INTRAMUSCULAR; INTRAVENOUS 2 TIMES DAILY
Status: COMPLETED | OUTPATIENT
Start: 2025-04-07 | End: 2025-04-08

## 2025-04-07 RX ORDER — IPRATROPIUM BROMIDE AND ALBUTEROL SULFATE 2.5; .5 MG/3ML; MG/3ML
1 SOLUTION RESPIRATORY (INHALATION)
Status: DISCONTINUED | OUTPATIENT
Start: 2025-04-07 | End: 2025-04-12

## 2025-04-07 RX ORDER — BUDESONIDE 0.5 MG/2ML
0.5 INHALANT ORAL
Status: DISCONTINUED | OUTPATIENT
Start: 2025-04-07 | End: 2025-04-12 | Stop reason: SDUPTHER

## 2025-04-07 RX ADMIN — DONEPEZIL HYDROCHLORIDE 10 MG: 5 TABLET ORAL at 21:35

## 2025-04-07 RX ADMIN — AZITHROMYCIN MONOHYDRATE 500 MG: 500 INJECTION, POWDER, LYOPHILIZED, FOR SOLUTION INTRAVENOUS at 18:12

## 2025-04-07 RX ADMIN — SODIUM CHLORIDE, PRESERVATIVE FREE 10 ML: 5 INJECTION INTRAVENOUS at 21:36

## 2025-04-07 RX ADMIN — FLUTICASONE PROPIONATE 1 PUFF: 110 AEROSOL, METERED RESPIRATORY (INHALATION) at 20:53

## 2025-04-07 RX ADMIN — CARVEDILOL 6.25 MG: 6.25 TABLET, FILM COATED ORAL at 21:35

## 2025-04-07 RX ADMIN — FUROSEMIDE 20 MG: 10 INJECTION, SOLUTION INTRAMUSCULAR; INTRAVENOUS at 18:07

## 2025-04-07 RX ADMIN — ENOXAPARIN SODIUM 40 MG: 100 INJECTION SUBCUTANEOUS at 08:33

## 2025-04-07 RX ADMIN — DEXAMETHASONE SODIUM PHOSPHATE 10 MG: 10 INJECTION, SOLUTION INTRAMUSCULAR; INTRAVENOUS at 18:07

## 2025-04-07 RX ADMIN — WATER 1000 MG: 1 INJECTION INTRAMUSCULAR; INTRAVENOUS; SUBCUTANEOUS at 23:31

## 2025-04-07 RX ADMIN — FUROSEMIDE 20 MG: 10 INJECTION, SOLUTION INTRAMUSCULAR; INTRAVENOUS at 08:33

## 2025-04-07 RX ADMIN — TAMSULOSIN HYDROCHLORIDE 0.4 MG: 0.4 CAPSULE ORAL at 21:35

## 2025-04-07 RX ADMIN — HYDRALAZINE HYDROCHLORIDE 10 MG: 20 INJECTION INTRAMUSCULAR; INTRAVENOUS at 11:50

## 2025-04-07 RX ADMIN — BUDESONIDE 500 MCG: 0.5 INHALANT RESPIRATORY (INHALATION) at 08:10

## 2025-04-07 RX ADMIN — ALBUTEROL SULFATE 2 PUFF: 90 AEROSOL, METERED RESPIRATORY (INHALATION) at 20:47

## 2025-04-07 RX ADMIN — ATORVASTATIN CALCIUM 10 MG: 10 TABLET, FILM COATED ORAL at 21:36

## 2025-04-07 RX ADMIN — MEMANTINE 10 MG: 10 TABLET ORAL at 21:35

## 2025-04-07 RX ADMIN — FUROSEMIDE 20 MG: 10 INJECTION, SOLUTION INTRAMUSCULAR; INTRAVENOUS at 03:59

## 2025-04-07 RX ADMIN — IPRATROPIUM BROMIDE AND ALBUTEROL SULFATE 1 DOSE: .5; 3 SOLUTION RESPIRATORY (INHALATION) at 08:00

## 2025-04-07 RX ADMIN — MIDAZOLAM HYDROCHLORIDE 1 MG: 1 INJECTION, SOLUTION INTRAMUSCULAR; INTRAVENOUS at 10:08

## 2025-04-07 RX ADMIN — IPRATROPIUM BROMIDE AND ALBUTEROL SULFATE 1 DOSE: .5; 3 SOLUTION RESPIRATORY (INHALATION) at 11:40

## 2025-04-07 RX ADMIN — IPRATROPIUM BROMIDE AND ALBUTEROL SULFATE 1 DOSE: .5; 3 SOLUTION RESPIRATORY (INHALATION) at 16:09

## 2025-04-07 NOTE — FLOWSHEET NOTE
Patient arrived from ED to room 324. Patient is very dyspneic at rest. Daughter is at bedside. 200's SBP noted upon arrival - d/w hospitalist, cardene ordered. Patient c/o urge to void, straight cathed with 950cc's obtained. BP improved s/p void.    04/06/25 2053   Vital Signs   Pulse 90   Respirations 26   BP (!) 213/89   MAP (Calculated) 130   MAP (mmHg) 115

## 2025-04-07 NOTE — CARE COORDINATION
04/07/25 2:01 PM      Care Management Initial Assessment  4/7/2025 2:01 PM  If patient is discharged prior to next notation, then this note serves as note for discharge by case management.    Reason for Admission:   COPD exacerbation (HCC) [J44.1]  Acute on chronic hypoxic respiratory failure [J96.21]  COVID-19 [U07.1]    Patient Admission Status: Inpatient  Date Admitted to [place]: 4/6/2025  RUR: Readmission Risk Score: 15.9    Hospitalization in the last 30 days (Readmission):  No        Advance Care Planning:  Code Status: DNR  Primary Healthcare Decision Maker: (P) Named in Scanned ACP Document   Advance Directive: has an advanced directive - a copy has been provided     __________________________________________________________________________  Assessment:   Patient resides with granddaughterNirmala ([phone]) at address listed.  Patient uses 2L O2 at baseline, provided by [facility].  Has concentrator, portables, and back up tanks.  Has been to SNF in the past, would prefer not to return.  Will plan for discharge home with HH with PT, OT, and skilled RN.  Need new PCP, would like to use [facility].    Comments:     Discharge Concerns: []Yes [x]No []Unknown   Describe:    Financial concerns/barriers: []Yes, explain: [x]No []Unknown/Not discussed  __________________________________________________________________________    Insurer:   Active Insurance as of 4/6/2025       Primary Coverage       Payor Plan Insurance Group Employer/Plan Group    MEDICARE MEDICARE PART A AND B        Payor Address Payor Phone Number Payor Fax Number Effective Dates    [address] [address] [phone]  12/1/1999 - None Entered    [facility] [id number]         Subscriber Name Subscriber Birth Date Member ID       CHARLI WILLINGHAM 12/10/1934 [id number]               Secondary Coverage       Payor Plan Insurance Group Employer/Plan Group    UNITED HEALTHCARE AARP HEALTH CARE MEDICARE SUPP        Payor Plan Address

## 2025-04-07 NOTE — CONSULTS
Name: Kevan Lynne [facility]: [facility]   : 12/10/1934 Admit Date: 2025   Phone: [phone]  Room: 24/   PCP: Cierra Isaac MD  MRN: [id number]   Date: 2025  Code: DNR          Chart and notes reviewed. Data reviewed. I review the patient's current medications in the medical record at each encounter.  I have evaluated and examined the patient.     HPI:    7:49 AM       History was obtained from patient, chart review.      I was asked by Stephanie Gray MD to see Kevan Lynne in consultation for a chief complaint of acute on chronic respiratory failure with hypoxia.      History of Present Illness:  Mr. Lynne is a 89 yo gentleman with a history of COPD and chronic respiratory failure with hypoxia on 2L NC at baseline who is admitted with acute on chronic respiratory failure with hypoxia in the setting of a COPD exacerbation due to COVID-19. He was described to have a cough for 2-3 days prior to admission with progression to worsening hypoxia, shortness of breath, weakness, poor appetite/PO intake, and confusion. EMS found him to be in the 80's on home 2L and in respiratory distress. Placed on BiPAP in the ED, has remained on this overnight and is currently satting well/breathing comfortably on  35%. Denies shortness of breath currently.     Labs: Cr 1.02, Procalcitonin <0.05, CRP 0.96, WBC 5.1, Hgb 7.8, +COVID-19, ABG 7.38/48/77 on     Images reviewed:  CXR 2025: hyperinflation, interstitial infiltrates, small bilateral effusions      Past Medical History:   Diagnosis Date    BPH (benign prostatic hyperplasia)     Chronic hypoxemic respiratory failure (HCC)     COPD (chronic obstructive pulmonary disease) (HCC)     Dementia (HCC)     HTN (hypertension)     Hyperlipidemia     Macular degeneration of right eye     Obese        Past Surgical History:   Procedure Laterality Date    PROSTATECTOMY         Family History   Problem Relation Age of Onset    Stroke

## 2025-04-07 NOTE — ED NOTES
Administered PRN hydralazine for pt's pressures. Awaiting respiratory to bring pt upstairs to the 3rd floor.

## 2025-04-07 NOTE — CARE COORDINATION
04/07/25 2:27 PM  PCP appointment scheduled for first available at [facility] for Friday 6/20/25 at 9AM.  Added to AVS.  MARKUS Harkins

## 2025-04-08 LAB
ANION GAP SERPL CALC-SCNC: 7 MMOL/L (ref 2–12)
BACTERIA SPEC CULT: ABNORMAL
BASOPHILS # BLD: 0.01 K/UL (ref 0–0.1)
BASOPHILS NFR BLD: 0.1 % (ref 0–1)
BUN SERPL-MCNC: 24 MG/DL (ref 6–20)
BUN/CREAT SERPL: 22 (ref 12–20)
CALCIUM SERPL-MCNC: 8.8 MG/DL (ref 8.5–10.1)
CHLORIDE SERPL-SCNC: 98 MMOL/L (ref 97–108)
CO2 SERPL-SCNC: 31 MMOL/L (ref 21–32)
CREAT SERPL-MCNC: 1.07 MG/DL (ref 0.7–1.3)
CRP SERPL-MCNC: 0.71 MG/DL (ref 0–0.3)
DIFFERENTIAL METHOD BLD: ABNORMAL
EOSINOPHIL # BLD: 0 K/UL (ref 0–0.4)
EOSINOPHIL NFR BLD: 0 % (ref 0–7)
ERYTHROCYTE [DISTWIDTH] IN BLOOD BY AUTOMATED COUNT: 19.3 % (ref 11.5–14.5)
GLUCOSE SERPL-MCNC: 165 MG/DL (ref 65–100)
HCT VFR BLD AUTO: 27.3 % (ref 36.6–50.3)
HGB BLD-MCNC: 7.9 G/DL (ref 12.1–17)
IMM GRANULOCYTES # BLD AUTO: 0.08 K/UL (ref 0–0.04)
IMM GRANULOCYTES NFR BLD AUTO: 0.8 % (ref 0–0.5)
LYMPHOCYTES # BLD: 0.54 K/UL (ref 0.8–3.5)
LYMPHOCYTES NFR BLD: 5.2 % (ref 12–49)
MCH RBC QN AUTO: 19.5 PG (ref 26–34)
MCHC RBC AUTO-ENTMCNC: 28.9 G/DL (ref 30–36.5)
MCV RBC AUTO: 67.2 FL (ref 80–99)
MONOCYTES # BLD: 1.51 K/UL (ref 0–1)
MONOCYTES NFR BLD: 14.5 % (ref 5–13)
NEUTS SEG # BLD: 8.26 K/UL (ref 1.8–8)
NEUTS SEG NFR BLD: 79.4 % (ref 32–75)
NRBC # BLD: 0.02 K/UL (ref 0–0.01)
NRBC BLD-RTO: 0.2 PER 100 WBC
PLATELET # BLD AUTO: 330 K/UL (ref 150–400)
PMV BLD AUTO: 10.7 FL (ref 8.9–12.9)
POTASSIUM SERPL-SCNC: 3.6 MMOL/L (ref 3.5–5.1)
RBC # BLD AUTO: 4.06 M/UL (ref 4.1–5.7)
RBC MORPH BLD: ABNORMAL
SERVICE CMNT-IMP: ABNORMAL
SERVICE CMNT-IMP: ABNORMAL
SODIUM SERPL-SCNC: 136 MMOL/L (ref 136–145)
WBC # BLD AUTO: 10.4 K/UL (ref 4.1–11.1)

## 2025-04-08 PROCEDURE — 6370000000 HC RX 637 (ALT 250 FOR IP): Performed by: INTERNAL MEDICINE

## 2025-04-08 PROCEDURE — 80048 BASIC METABOLIC PNL TOTAL CA: CPT

## 2025-04-08 PROCEDURE — 2500000003 HC RX 250 WO HCPCS: Performed by: INTERNAL MEDICINE

## 2025-04-08 PROCEDURE — 94640 AIRWAY INHALATION TREATMENT: CPT

## 2025-04-08 PROCEDURE — 85025 COMPLETE CBC W/AUTO DIFF WBC: CPT

## 2025-04-08 PROCEDURE — 2700000000 HC OXYGEN THERAPY PER DAY

## 2025-04-08 PROCEDURE — 2580000003 HC RX 258: Performed by: INTERNAL MEDICINE

## 2025-04-08 PROCEDURE — 6360000002 HC RX W HCPCS: Performed by: INTERNAL MEDICINE

## 2025-04-08 PROCEDURE — 6360000002 HC RX W HCPCS: Performed by: HOSPITALIST

## 2025-04-08 PROCEDURE — 94660 CPAP INITIATION&MGMT: CPT

## 2025-04-08 PROCEDURE — 2060000000 HC ICU INTERMEDIATE R&B

## 2025-04-08 PROCEDURE — 2500000003 HC RX 250 WO HCPCS: Performed by: HOSPITALIST

## 2025-04-08 PROCEDURE — 94761 N-INVAS EAR/PLS OXIMETRY MLT: CPT

## 2025-04-08 PROCEDURE — 86140 C-REACTIVE PROTEIN: CPT

## 2025-04-08 RX ORDER — METOPROLOL TARTRATE 1 MG/ML
2.5 INJECTION, SOLUTION INTRAVENOUS EVERY 6 HOURS
Status: DISCONTINUED | OUTPATIENT
Start: 2025-04-08 | End: 2025-04-09

## 2025-04-08 RX ADMIN — SODIUM CHLORIDE, PRESERVATIVE FREE 10 ML: 5 INJECTION INTRAVENOUS at 09:00

## 2025-04-08 RX ADMIN — DOXYCYCLINE 100 MG: 100 INJECTION, POWDER, LYOPHILIZED, FOR SOLUTION INTRAVENOUS at 09:12

## 2025-04-08 RX ADMIN — IPRATROPIUM BROMIDE AND ALBUTEROL SULFATE 1 DOSE: .5; 3 SOLUTION RESPIRATORY (INHALATION) at 20:37

## 2025-04-08 RX ADMIN — ALBUTEROL SULFATE 2 PUFF: 90 AEROSOL, METERED RESPIRATORY (INHALATION) at 08:07

## 2025-04-08 RX ADMIN — FUROSEMIDE 20 MG: 10 INJECTION, SOLUTION INTRAMUSCULAR; INTRAVENOUS at 08:58

## 2025-04-08 RX ADMIN — METOPROLOL TARTRATE 2.5 MG: 5 INJECTION INTRAVENOUS at 23:02

## 2025-04-08 RX ADMIN — DOXYCYCLINE 100 MG: 100 INJECTION, POWDER, LYOPHILIZED, FOR SOLUTION INTRAVENOUS at 20:10

## 2025-04-08 RX ADMIN — DEXAMETHASONE SODIUM PHOSPHATE 10 MG: 10 INJECTION, SOLUTION INTRAMUSCULAR; INTRAVENOUS at 17:14

## 2025-04-08 RX ADMIN — IPRATROPIUM BROMIDE AND ALBUTEROL SULFATE 1 DOSE: .5; 3 SOLUTION RESPIRATORY (INHALATION) at 23:47

## 2025-04-08 RX ADMIN — IPRATROPIUM BROMIDE AND ALBUTEROL SULFATE 1 DOSE: .5; 3 SOLUTION RESPIRATORY (INHALATION) at 15:52

## 2025-04-08 RX ADMIN — SODIUM CHLORIDE, PRESERVATIVE FREE 10 ML: 5 INJECTION INTRAVENOUS at 23:02

## 2025-04-08 RX ADMIN — FUROSEMIDE 20 MG: 10 INJECTION, SOLUTION INTRAMUSCULAR; INTRAVENOUS at 17:16

## 2025-04-08 RX ADMIN — IPRATROPIUM BROMIDE AND ALBUTEROL SULFATE 1 DOSE: .5; 3 SOLUTION RESPIRATORY (INHALATION) at 11:23

## 2025-04-08 RX ADMIN — ENOXAPARIN SODIUM 40 MG: 100 INJECTION SUBCUTANEOUS at 08:57

## 2025-04-08 RX ADMIN — FLUTICASONE PROPIONATE 1 PUFF: 110 AEROSOL, METERED RESPIRATORY (INHALATION) at 08:10

## 2025-04-08 RX ADMIN — METOPROLOL TARTRATE 2.5 MG: 5 INJECTION INTRAVENOUS at 15:40

## 2025-04-08 RX ADMIN — METOPROLOL TARTRATE 2.5 MG: 5 INJECTION INTRAVENOUS at 10:14

## 2025-04-08 RX ADMIN — BUDESONIDE 500 MCG: 0.5 INHALANT RESPIRATORY (INHALATION) at 20:37

## 2025-04-09 LAB
ANION GAP SERPL CALC-SCNC: 7 MMOL/L (ref 2–12)
BUN SERPL-MCNC: 36 MG/DL (ref 6–20)
BUN/CREAT SERPL: 33 (ref 12–20)
CALCIUM SERPL-MCNC: 8.5 MG/DL (ref 8.5–10.1)
CHLORIDE SERPL-SCNC: 98 MMOL/L (ref 97–108)
CO2 SERPL-SCNC: 34 MMOL/L (ref 21–32)
CREAT SERPL-MCNC: 1.08 MG/DL (ref 0.7–1.3)
GLUCOSE SERPL-MCNC: 161 MG/DL (ref 65–100)
POTASSIUM SERPL-SCNC: 3.4 MMOL/L (ref 3.5–5.1)
SODIUM SERPL-SCNC: 139 MMOL/L (ref 136–145)

## 2025-04-09 PROCEDURE — 6360000002 HC RX W HCPCS: Performed by: INTERNAL MEDICINE

## 2025-04-09 PROCEDURE — 94640 AIRWAY INHALATION TREATMENT: CPT

## 2025-04-09 PROCEDURE — 6370000000 HC RX 637 (ALT 250 FOR IP): Performed by: INTERNAL MEDICINE

## 2025-04-09 PROCEDURE — 2700000000 HC OXYGEN THERAPY PER DAY

## 2025-04-09 PROCEDURE — 80048 BASIC METABOLIC PNL TOTAL CA: CPT

## 2025-04-09 PROCEDURE — 6370000000 HC RX 637 (ALT 250 FOR IP): Performed by: HOSPITALIST

## 2025-04-09 PROCEDURE — 2500000003 HC RX 250 WO HCPCS: Performed by: HOSPITALIST

## 2025-04-09 PROCEDURE — 2060000000 HC ICU INTERMEDIATE R&B

## 2025-04-09 PROCEDURE — 94660 CPAP INITIATION&MGMT: CPT

## 2025-04-09 PROCEDURE — 94761 N-INVAS EAR/PLS OXIMETRY MLT: CPT

## 2025-04-09 PROCEDURE — 6360000002 HC RX W HCPCS: Performed by: HOSPITALIST

## 2025-04-09 PROCEDURE — 2500000003 HC RX 250 WO HCPCS: Performed by: INTERNAL MEDICINE

## 2025-04-09 PROCEDURE — 36415 COLL VENOUS BLD VENIPUNCTURE: CPT

## 2025-04-09 PROCEDURE — 2580000003 HC RX 258: Performed by: INTERNAL MEDICINE

## 2025-04-09 RX ORDER — MORPHINE SULFATE 2 MG/ML
2 INJECTION, SOLUTION INTRAMUSCULAR; INTRAVENOUS EVERY 4 HOURS PRN
Refills: 0 | Status: DISCONTINUED | OUTPATIENT
Start: 2025-04-09 | End: 2025-04-14 | Stop reason: HOSPADM

## 2025-04-09 RX ADMIN — MORPHINE SULFATE 2 MG: 2 INJECTION, SOLUTION INTRAMUSCULAR; INTRAVENOUS at 17:20

## 2025-04-09 RX ADMIN — DOXYCYCLINE 100 MG: 100 INJECTION, POWDER, LYOPHILIZED, FOR SOLUTION INTRAVENOUS at 19:42

## 2025-04-09 RX ADMIN — METOPROLOL TARTRATE 2.5 MG: 5 INJECTION INTRAVENOUS at 09:44

## 2025-04-09 RX ADMIN — WATER 1000 MG: 1 INJECTION INTRAMUSCULAR; INTRAVENOUS; SUBCUTANEOUS at 00:02

## 2025-04-09 RX ADMIN — CARVEDILOL 6.25 MG: 6.25 TABLET, FILM COATED ORAL at 19:41

## 2025-04-09 RX ADMIN — WATER 1000 MG: 1 INJECTION INTRAMUSCULAR; INTRAVENOUS; SUBCUTANEOUS at 23:08

## 2025-04-09 RX ADMIN — IPRATROPIUM BROMIDE AND ALBUTEROL SULFATE 1 DOSE: .5; 3 SOLUTION RESPIRATORY (INHALATION) at 19:08

## 2025-04-09 RX ADMIN — IPRATROPIUM BROMIDE AND ALBUTEROL SULFATE 1 DOSE: .5; 3 SOLUTION RESPIRATORY (INHALATION) at 17:27

## 2025-04-09 RX ADMIN — DONEPEZIL HYDROCHLORIDE 10 MG: 5 TABLET ORAL at 19:42

## 2025-04-09 RX ADMIN — METOPROLOL TARTRATE 2.5 MG: 5 INJECTION INTRAVENOUS at 03:55

## 2025-04-09 RX ADMIN — DOXYCYCLINE 100 MG: 100 INJECTION, POWDER, LYOPHILIZED, FOR SOLUTION INTRAVENOUS at 08:35

## 2025-04-09 RX ADMIN — SODIUM CHLORIDE, PRESERVATIVE FREE 10 ML: 5 INJECTION INTRAVENOUS at 19:42

## 2025-04-09 RX ADMIN — TAMSULOSIN HYDROCHLORIDE 0.4 MG: 0.4 CAPSULE ORAL at 19:42

## 2025-04-09 RX ADMIN — SODIUM CHLORIDE, PRESERVATIVE FREE 10 ML: 5 INJECTION INTRAVENOUS at 19:48

## 2025-04-09 RX ADMIN — SODIUM CHLORIDE, PRESERVATIVE FREE 10 ML: 5 INJECTION INTRAVENOUS at 08:31

## 2025-04-09 RX ADMIN — POTASSIUM BICARBONATE 40 MEQ: 782 TABLET, EFFERVESCENT ORAL at 09:42

## 2025-04-09 RX ADMIN — BUDESONIDE 500 MCG: 0.5 INHALANT RESPIRATORY (INHALATION) at 19:08

## 2025-04-09 RX ADMIN — MEMANTINE 10 MG: 10 TABLET ORAL at 19:42

## 2025-04-09 RX ADMIN — ENOXAPARIN SODIUM 40 MG: 100 INJECTION SUBCUTANEOUS at 08:31

## 2025-04-09 RX ADMIN — DEXAMETHASONE SODIUM PHOSPHATE 10 MG: 10 INJECTION, SOLUTION INTRAMUSCULAR; INTRAVENOUS at 17:18

## 2025-04-09 NOTE — CARE COORDINATION
04/09/25 1227   Service Assessment   Patient Orientation Other (see comment)  (covid)   Cognition Alert   History Provided By Child/Family  (granddaughter)   Primary Caregiver Family   Support Systems Family Members   Patient's Healthcare Decision Maker is: Named in Scanned ACP Document  (Candida Rivero and granddaughter,Christina Haase @ [phone])   PCP Verified by CM Yes  (Dr Cierra Isaac)   Prior Functional Level Assistance with the following:;Bathing;Dressing;Cooking;Housework;Shopping;Mobility;Toileting   Current Functional Level Assistance with the following:;Bathing;Dressing;Toileting;Cooking;Housework;Shopping;Mobility   Can patient return to prior living arrangement Yes   Ability to make needs known: Fair   Family able to assist with home care needs: Yes   Would you like for me to discuss the discharge plan with any other family members/significant others, and if so, who? Yes  (my granddaughter)   Financial Resources Medicare   Community Resources None   CM/SW Referral Disease Management Education   Social/Functional History   Lives With Family  (granddaughter)   Type of Home House   Home Layout Two level   Home Access Stairs to enter with rails   Entrance Stairs - Number of Steps 4 entry steps,pt has a separate entrance that he uses to walkin with no steps condtructed for pt   Entrance Stairs - Rails Both   Bathroom Shower/Tub Walk-in shower   Bathroom Toilet Standard   Bathroom Equipment Grab bars in shower   Home Equipment Oxygen  (on 2 liters oxygen continuously)   Receives Help From Family   Prior Level of Assist for ADLs Needs assistance   Bath Moderate assistance  (just recently)   Dressing Moderate assistance  (just recently)   Grooming Moderate assistance   Feeding Independent   Toileting Needs assistance  (needs cues from family)   Prior Level of Assist for Homemaking Needs assistance   Meal Prep Total   Laundry Total   Vacuuming Total   Cleaning Total   Driving Total   Shopping Total

## 2025-04-10 PROCEDURE — 6370000000 HC RX 637 (ALT 250 FOR IP): Performed by: PHYSICIAN ASSISTANT

## 2025-04-10 PROCEDURE — 6370000000 HC RX 637 (ALT 250 FOR IP): Performed by: INTERNAL MEDICINE

## 2025-04-10 PROCEDURE — 6360000002 HC RX W HCPCS: Performed by: INTERNAL MEDICINE

## 2025-04-10 PROCEDURE — 94761 N-INVAS EAR/PLS OXIMETRY MLT: CPT

## 2025-04-10 PROCEDURE — 2060000000 HC ICU INTERMEDIATE R&B

## 2025-04-10 PROCEDURE — 2700000000 HC OXYGEN THERAPY PER DAY

## 2025-04-10 PROCEDURE — 6360000002 HC RX W HCPCS: Performed by: HOSPITALIST

## 2025-04-10 PROCEDURE — 94640 AIRWAY INHALATION TREATMENT: CPT

## 2025-04-10 PROCEDURE — 94660 CPAP INITIATION&MGMT: CPT

## 2025-04-10 PROCEDURE — 51702 INSERT TEMP BLADDER CATH: CPT

## 2025-04-10 PROCEDURE — 2580000003 HC RX 258: Performed by: INTERNAL MEDICINE

## 2025-04-10 PROCEDURE — 2500000003 HC RX 250 WO HCPCS: Performed by: HOSPITALIST

## 2025-04-10 PROCEDURE — 6370000000 HC RX 637 (ALT 250 FOR IP): Performed by: FAMILY MEDICINE

## 2025-04-10 PROCEDURE — 5A0945A ASSISTANCE WITH RESPIRATORY VENTILATION, 24-96 CONSECUTIVE HOURS, HIGH NASAL FLOW/VELOCITY: ICD-10-PCS | Performed by: INTERNAL MEDICINE

## 2025-04-10 PROCEDURE — 2500000003 HC RX 250 WO HCPCS: Performed by: INTERNAL MEDICINE

## 2025-04-10 RX ORDER — DEXAMETHASONE 6 MG/1
6 TABLET ORAL EVERY 24 HOURS
Status: DISCONTINUED | OUTPATIENT
Start: 2025-04-10 | End: 2025-04-14 | Stop reason: HOSPADM

## 2025-04-10 RX ORDER — GUAIFENESIN 600 MG/1
600 TABLET, EXTENDED RELEASE ORAL 2 TIMES DAILY
Status: DISCONTINUED | OUTPATIENT
Start: 2025-04-10 | End: 2025-04-11

## 2025-04-10 RX ORDER — DOXYCYCLINE HYCLATE 100 MG
100 TABLET ORAL EVERY 12 HOURS SCHEDULED
Status: COMPLETED | OUTPATIENT
Start: 2025-04-10 | End: 2025-04-12

## 2025-04-10 RX ADMIN — MEMANTINE 10 MG: 10 TABLET ORAL at 23:13

## 2025-04-10 RX ADMIN — WATER 1000 MG: 1 INJECTION INTRAMUSCULAR; INTRAVENOUS; SUBCUTANEOUS at 23:11

## 2025-04-10 RX ADMIN — ENOXAPARIN SODIUM 40 MG: 100 INJECTION SUBCUTANEOUS at 08:55

## 2025-04-10 RX ADMIN — MEMANTINE 10 MG: 10 TABLET ORAL at 10:13

## 2025-04-10 RX ADMIN — IPRATROPIUM BROMIDE AND ALBUTEROL SULFATE 1 DOSE: .5; 3 SOLUTION RESPIRATORY (INHALATION) at 16:17

## 2025-04-10 RX ADMIN — CARVEDILOL 6.25 MG: 6.25 TABLET, FILM COATED ORAL at 23:12

## 2025-04-10 RX ADMIN — BUDESONIDE 500 MCG: 0.5 INHALANT RESPIRATORY (INHALATION) at 21:51

## 2025-04-10 RX ADMIN — DOXYCYCLINE 100 MG: 100 INJECTION, POWDER, LYOPHILIZED, FOR SOLUTION INTRAVENOUS at 08:55

## 2025-04-10 RX ADMIN — TAMSULOSIN HYDROCHLORIDE 0.4 MG: 0.4 CAPSULE ORAL at 23:13

## 2025-04-10 RX ADMIN — IPRATROPIUM BROMIDE AND ALBUTEROL SULFATE 1 DOSE: .5; 3 SOLUTION RESPIRATORY (INHALATION) at 07:40

## 2025-04-10 RX ADMIN — GUAIFENESIN 600 MG: 600 TABLET ORAL at 23:13

## 2025-04-10 RX ADMIN — SODIUM CHLORIDE, PRESERVATIVE FREE 10 ML: 5 INJECTION INTRAVENOUS at 23:13

## 2025-04-10 RX ADMIN — DOXYCYCLINE HYCLATE 100 MG: 100 TABLET, COATED ORAL at 23:13

## 2025-04-10 RX ADMIN — IPRATROPIUM BROMIDE AND ALBUTEROL SULFATE 1 DOSE: .5; 3 SOLUTION RESPIRATORY (INHALATION) at 21:41

## 2025-04-10 RX ADMIN — DEXAMETHASONE 6 MG: 6 TABLET ORAL at 17:58

## 2025-04-10 RX ADMIN — ALBUTEROL SULFATE 2.5 MG: 1.25 SOLUTION RESPIRATORY (INHALATION) at 13:58

## 2025-04-10 RX ADMIN — BUDESONIDE 500 MCG: 0.5 INHALANT RESPIRATORY (INHALATION) at 07:35

## 2025-04-10 RX ADMIN — DONEPEZIL HYDROCHLORIDE 10 MG: 5 TABLET ORAL at 23:12

## 2025-04-10 RX ADMIN — ALBUTEROL SULFATE 2 PUFF: 90 AEROSOL, METERED RESPIRATORY (INHALATION) at 11:25

## 2025-04-10 RX ADMIN — CARVEDILOL 6.25 MG: 6.25 TABLET, FILM COATED ORAL at 10:13

## 2025-04-10 RX ADMIN — GUAIFENESIN 600 MG: 600 TABLET ORAL at 13:18

## 2025-04-10 NOTE — CARE COORDINATION
4/10/25  11:51 AM    Care Management Progress Note    Reason for Admission:   COPD exacerbation (HCC) [J44.1]  Acute on chronic hypoxic respiratory failure [J96.21]  COVID-19 [U07.1]         Patient Admission Status: Inpatient  RUR: 19%  Hospitalization in the last 30 days (Readmission):  No        Transition of care plan:  Ongoing medical management  Pulmonary consulted, Bipap now on 6L of O2  Discharge plan:  PT/OT evals ordered  CM following for changes in his O2 needs- baseline is on 2L.   Discharge plan communicated with patient and/or discharge caregiver: Yes    Date 1st IMM letter given: 4/9/25  Outpatient follow-up.  Transport at discharge: MARKUS Lowe  [facility]      Available via [employer]

## 2025-04-11 ENCOUNTER — APPOINTMENT (OUTPATIENT)
Facility: HOSPITAL | Age: 89
DRG: 177 | End: 2025-04-11
Payer: MEDICARE

## 2025-04-11 LAB
ANION GAP SERPL CALC-SCNC: 6 MMOL/L (ref 2–12)
BASOPHILS # BLD: 0.01 K/UL (ref 0–0.1)
BASOPHILS NFR BLD: 0.1 % (ref 0–1)
BUN SERPL-MCNC: 41 MG/DL (ref 6–20)
BUN/CREAT SERPL: 47 (ref 12–20)
CALCIUM SERPL-MCNC: 8.5 MG/DL (ref 8.5–10.1)
CHLORIDE SERPL-SCNC: 106 MMOL/L (ref 97–108)
CO2 SERPL-SCNC: 29 MMOL/L (ref 21–32)
CREAT SERPL-MCNC: 0.87 MG/DL (ref 0.7–1.3)
DIFFERENTIAL METHOD BLD: ABNORMAL
EOSINOPHIL # BLD: 0 K/UL (ref 0–0.4)
EOSINOPHIL NFR BLD: 0 % (ref 0–7)
ERYTHROCYTE [DISTWIDTH] IN BLOOD BY AUTOMATED COUNT: 19.6 % (ref 11.5–14.5)
GLUCOSE SERPL-MCNC: 146 MG/DL (ref 65–100)
HCT VFR BLD AUTO: 25.6 % (ref 36.6–50.3)
HGB BLD-MCNC: 7.3 G/DL (ref 12.1–17)
IMM GRANULOCYTES # BLD AUTO: 0.13 K/UL (ref 0–0.04)
IMM GRANULOCYTES NFR BLD AUTO: 1 % (ref 0–0.5)
LYMPHOCYTES # BLD: 0.74 K/UL (ref 0.8–3.5)
LYMPHOCYTES NFR BLD: 5.9 % (ref 12–49)
MCH RBC QN AUTO: 20.1 PG (ref 26–34)
MCHC RBC AUTO-ENTMCNC: 28.5 G/DL (ref 30–36.5)
MCV RBC AUTO: 70.3 FL (ref 80–99)
MONOCYTES # BLD: 0.79 K/UL (ref 0–1)
MONOCYTES NFR BLD: 6.3 % (ref 5–13)
NEUTS SEG # BLD: 10.93 K/UL (ref 1.8–8)
NEUTS SEG NFR BLD: 86.7 % (ref 32–75)
NRBC # BLD: 0 K/UL (ref 0–0.01)
NRBC BLD-RTO: 0 PER 100 WBC
PLATELET # BLD AUTO: 353 K/UL (ref 150–400)
PMV BLD AUTO: 10.6 FL (ref 8.9–12.9)
POTASSIUM SERPL-SCNC: 3.8 MMOL/L (ref 3.5–5.1)
RBC # BLD AUTO: 3.64 M/UL (ref 4.1–5.7)
RBC MORPH BLD: ABNORMAL
SODIUM SERPL-SCNC: 141 MMOL/L (ref 136–145)
WBC # BLD AUTO: 12.6 K/UL (ref 4.1–11.1)

## 2025-04-11 PROCEDURE — 94660 CPAP INITIATION&MGMT: CPT

## 2025-04-11 PROCEDURE — 2700000000 HC OXYGEN THERAPY PER DAY

## 2025-04-11 PROCEDURE — 2060000000 HC ICU INTERMEDIATE R&B

## 2025-04-11 PROCEDURE — 6360000002 HC RX W HCPCS: Performed by: INTERNAL MEDICINE

## 2025-04-11 PROCEDURE — 6370000000 HC RX 637 (ALT 250 FOR IP): Performed by: INTERNAL MEDICINE

## 2025-04-11 PROCEDURE — 80048 BASIC METABOLIC PNL TOTAL CA: CPT

## 2025-04-11 PROCEDURE — 2500000003 HC RX 250 WO HCPCS: Performed by: INTERNAL MEDICINE

## 2025-04-11 PROCEDURE — 85025 COMPLETE CBC W/AUTO DIFF WBC: CPT

## 2025-04-11 PROCEDURE — 94669 MECHANICAL CHEST WALL OSCILL: CPT

## 2025-04-11 PROCEDURE — 6370000000 HC RX 637 (ALT 250 FOR IP): Performed by: PHYSICIAN ASSISTANT

## 2025-04-11 PROCEDURE — 6370000000 HC RX 637 (ALT 250 FOR IP)

## 2025-04-11 PROCEDURE — 94761 N-INVAS EAR/PLS OXIMETRY MLT: CPT

## 2025-04-11 PROCEDURE — 36415 COLL VENOUS BLD VENIPUNCTURE: CPT

## 2025-04-11 PROCEDURE — 92610 EVALUATE SWALLOWING FUNCTION: CPT | Performed by: SPEECH-LANGUAGE PATHOLOGIST

## 2025-04-11 PROCEDURE — 97530 THERAPEUTIC ACTIVITIES: CPT

## 2025-04-11 PROCEDURE — 6360000002 HC RX W HCPCS: Performed by: HOSPITALIST

## 2025-04-11 PROCEDURE — 6370000000 HC RX 637 (ALT 250 FOR IP): Performed by: FAMILY MEDICINE

## 2025-04-11 PROCEDURE — 94667 MNPJ CHEST WALL 1ST: CPT

## 2025-04-11 PROCEDURE — 2500000003 HC RX 250 WO HCPCS: Performed by: HOSPITALIST

## 2025-04-11 PROCEDURE — 94640 AIRWAY INHALATION TREATMENT: CPT

## 2025-04-11 PROCEDURE — 97161 PT EVAL LOW COMPLEX 20 MIN: CPT

## 2025-04-11 PROCEDURE — 97165 OT EVAL LOW COMPLEX 30 MIN: CPT

## 2025-04-11 PROCEDURE — 71045 X-RAY EXAM CHEST 1 VIEW: CPT

## 2025-04-11 RX ORDER — PREDNISOLONE ACETATE 10 MG/ML
1 SUSPENSION/ DROPS OPHTHALMIC DAILY
COMMUNITY

## 2025-04-11 RX ORDER — GUAIFENESIN/DEXTROMETHORPHAN 100-10MG/5
5 SYRUP ORAL EVERY 4 HOURS PRN
Status: DISCONTINUED | OUTPATIENT
Start: 2025-04-11 | End: 2025-04-14 | Stop reason: HOSPADM

## 2025-04-11 RX ORDER — PREDNISOLONE ACETATE 10 MG/ML
1 SUSPENSION/ DROPS OPHTHALMIC DAILY
Status: DISCONTINUED | OUTPATIENT
Start: 2025-04-11 | End: 2025-04-14 | Stop reason: HOSPADM

## 2025-04-11 RX ORDER — GUAIFENESIN 600 MG/1
1200 TABLET, EXTENDED RELEASE ORAL 2 TIMES DAILY
Status: DISCONTINUED | OUTPATIENT
Start: 2025-04-11 | End: 2025-04-14 | Stop reason: HOSPADM

## 2025-04-11 RX ADMIN — BUDESONIDE 500 MCG: 0.5 INHALANT RESPIRATORY (INHALATION) at 07:46

## 2025-04-11 RX ADMIN — ENOXAPARIN SODIUM 40 MG: 100 INJECTION SUBCUTANEOUS at 08:42

## 2025-04-11 RX ADMIN — DOXYCYCLINE HYCLATE 100 MG: 100 TABLET, COATED ORAL at 21:00

## 2025-04-11 RX ADMIN — WATER 1000 MG: 1 INJECTION INTRAMUSCULAR; INTRAVENOUS; SUBCUTANEOUS at 23:39

## 2025-04-11 RX ADMIN — GUAIFENESIN 1200 MG: 600 TABLET ORAL at 21:00

## 2025-04-11 RX ADMIN — FLUTICASONE PROPIONATE 1 PUFF: 110 AEROSOL, METERED RESPIRATORY (INHALATION) at 21:51

## 2025-04-11 RX ADMIN — ALBUTEROL SULFATE 2 PUFF: 90 AEROSOL, METERED RESPIRATORY (INHALATION) at 21:41

## 2025-04-11 RX ADMIN — DOXYCYCLINE HYCLATE 100 MG: 100 TABLET, COATED ORAL at 08:41

## 2025-04-11 RX ADMIN — MEMANTINE 10 MG: 10 TABLET ORAL at 08:41

## 2025-04-11 RX ADMIN — GUAIFENESIN 600 MG: 600 TABLET ORAL at 08:41

## 2025-04-11 RX ADMIN — IPRATROPIUM BROMIDE AND ALBUTEROL SULFATE 1 DOSE: .5; 3 SOLUTION RESPIRATORY (INHALATION) at 07:51

## 2025-04-11 RX ADMIN — DEXAMETHASONE 6 MG: 6 TABLET ORAL at 17:29

## 2025-04-11 RX ADMIN — ALBUTEROL SULFATE 2 PUFF: 90 AEROSOL, METERED RESPIRATORY (INHALATION) at 11:58

## 2025-04-11 RX ADMIN — TAMSULOSIN HYDROCHLORIDE 0.4 MG: 0.4 CAPSULE ORAL at 21:00

## 2025-04-11 RX ADMIN — SODIUM CHLORIDE, PRESERVATIVE FREE 10 ML: 5 INJECTION INTRAVENOUS at 21:08

## 2025-04-11 RX ADMIN — CARVEDILOL 6.25 MG: 6.25 TABLET, FILM COATED ORAL at 08:41

## 2025-04-11 RX ADMIN — MEMANTINE 10 MG: 10 TABLET ORAL at 21:00

## 2025-04-11 RX ADMIN — ALBUTEROL SULFATE 2 PUFF: 90 AEROSOL, METERED RESPIRATORY (INHALATION) at 15:21

## 2025-04-11 RX ADMIN — SODIUM CHLORIDE, PRESERVATIVE FREE 10 ML: 5 INJECTION INTRAVENOUS at 08:45

## 2025-04-11 RX ADMIN — CARVEDILOL 6.25 MG: 6.25 TABLET, FILM COATED ORAL at 21:00

## 2025-04-11 RX ADMIN — PREDNISOLONE ACETATE 1 DROP: 10 SUSPENSION/ DROPS OPHTHALMIC at 22:09

## 2025-04-11 RX ADMIN — DONEPEZIL HYDROCHLORIDE 10 MG: 5 TABLET ORAL at 20:59

## 2025-04-11 NOTE — CARE COORDINATION
I talked with pt.'s granddaughter to obtain preferences    Peferences are for [facility]  [facility].  Referrals for rehab sent through [facility] to each facility.    Jen Willoughby RN

## 2025-04-11 NOTE — CARE COORDINATION
Care Management Progress Note    Reason for Admission:   COPD exacerbation (HCC) [J44.1]  Acute on chronic hypoxic respiratory failure [J96.21]  COVID-19 [U07.1]         Patient Admission Status: Inpatient  RUR: 19%  Hospitalization in the last 30 days (Readmission):  no        Transition of care plan:  [Medical]  Pt is on 2 liters oxygen @ baseline  Pt was on BiPaP overnight and as needed for increased WOB  Pt is on 6 liters oxygen  I emailed granddaughter with whom pt lives a list of private duty aides.  If home health is prescribed,Granddaughter has no preferences for home health if HH is prescribed.Case Management will need orders from attending for SN/PT/OT.  I discussed Palliative Medicine consultation with attending on Wednesday who informed me granddaughter wants to see how her grandfather progresses over the next few days before consulting Palliative Medicine.  [DC plan]  Home health versus home with hospice  Discharge plan communicated with patient and/or discharge caregiver: ongoing discussions    Date 1st IMM letter given: 4/9/25  Outpatient follow-up.contingent upon granddaughters decisions   Transport at discharge:  to be determined    Jen Willoughby RN

## 2025-04-12 ENCOUNTER — APPOINTMENT (OUTPATIENT)
Facility: HOSPITAL | Age: 89
DRG: 177 | End: 2025-04-12
Payer: MEDICARE

## 2025-04-12 LAB
ALBUMIN SERPL-MCNC: 3 G/DL (ref 3.5–5)
ALBUMIN/GLOB SERPL: 0.8 (ref 1.1–2.2)
ALP SERPL-CCNC: 40 U/L (ref 45–117)
ALT SERPL-CCNC: 110 U/L (ref 12–78)
ANION GAP SERPL CALC-SCNC: 6 MMOL/L (ref 2–12)
AST SERPL-CCNC: 57 U/L (ref 15–37)
BASOPHILS # BLD: 0 K/UL (ref 0–0.1)
BASOPHILS NFR BLD: 0 % (ref 0–1)
BILIRUB SERPL-MCNC: 0.6 MG/DL (ref 0.2–1)
BUN SERPL-MCNC: 37 MG/DL (ref 6–20)
BUN/CREAT SERPL: 41 (ref 12–20)
CALCIUM SERPL-MCNC: 9 MG/DL (ref 8.5–10.1)
CHLORIDE SERPL-SCNC: 99 MMOL/L (ref 97–108)
CO2 SERPL-SCNC: 30 MMOL/L (ref 21–32)
CREAT SERPL-MCNC: 0.91 MG/DL (ref 0.7–1.3)
DIFFERENTIAL METHOD BLD: ABNORMAL
ECHO AO ASC DIAM: 2.8 CM
ECHO AO ASCENDING AORTA INDEX: 1.58 CM/M2
ECHO AO ROOT DIAM: 3.1 CM
ECHO AO ROOT INDEX: 1.75 CM/M2
ECHO AV AREA PEAK VELOCITY: 2.1 CM2
ECHO AV AREA PEAK VELOCITY: 2.6 CM2
ECHO AV AREA VTI: 2.6 CM2
ECHO AV AREA/BSA VTI: 1.5 CM2/M2
ECHO AV MEAN GRADIENT: 2 MMHG
ECHO AV MEAN VELOCITY: 0.6 M/S
ECHO AV PEAK GRADIENT: 4 MMHG
ECHO AV PEAK GRADIENT: 6 MMHG
ECHO AV PEAK VELOCITY: 1 M/S
ECHO AV PEAK VELOCITY: 1.2 M/S
ECHO AV VTI: 21.1 CM
ECHO BSA: 1.78 M2
ECHO LA DIAMETER INDEX: 2.03 CM/M2
ECHO LA DIAMETER: 3.6 CM
ECHO LA TO AORTIC ROOT RATIO: 1.16
ECHO LA VOL A-L A2C: 54 ML (ref 18–58)
ECHO LA VOL A-L A4C: 34 ML (ref 18–58)
ECHO LA VOL BP: 41 ML (ref 18–58)
ECHO LA VOL MOD A2C: 52 ML (ref 18–58)
ECHO LA VOL MOD A4C: 31 ML (ref 18–58)
ECHO LA VOL/BSA BIPLANE: 23 ML/M2 (ref 16–34)
ECHO LA VOLUME AREA LENGTH: 45 ML
ECHO LA VOLUME INDEX A-L A2C: 31 ML/M2 (ref 16–34)
ECHO LA VOLUME INDEX A-L A4C: 19 ML/M2 (ref 16–34)
ECHO LA VOLUME INDEX AREA LENGTH: 25 ML/M2 (ref 16–34)
ECHO LA VOLUME INDEX MOD A2C: 29 ML/M2 (ref 16–34)
ECHO LA VOLUME INDEX MOD A4C: 18 ML/M2 (ref 16–34)
ECHO LV E' LATERAL VELOCITY: 4.11 CM/S
ECHO LV E' SEPTAL VELOCITY: 5.24 CM/S
ECHO LV EDV A2C: 95 ML
ECHO LV EDV A4C: 100 ML
ECHO LV EDV BP: 102 ML (ref 67–155)
ECHO LV EDV INDEX A4C: 56 ML/M2
ECHO LV EDV INDEX BP: 58 ML/M2
ECHO LV EDV NDEX A2C: 54 ML/M2
ECHO LV EF PHYSICIAN: 55 %
ECHO LV EJECTION FRACTION A2C: 53 %
ECHO LV EJECTION FRACTION A4C: 54 %
ECHO LV EJECTION FRACTION BIPLANE: 55 % (ref 55–100)
ECHO LV ESV A2C: 45 ML
ECHO LV ESV A4C: 45 ML
ECHO LV ESV BP: 46 ML (ref 22–58)
ECHO LV ESV INDEX A2C: 25 ML/M2
ECHO LV ESV INDEX A4C: 25 ML/M2
ECHO LV ESV INDEX BP: 26 ML/M2
ECHO LV FRACTIONAL SHORTENING: 23 % (ref 28–44)
ECHO LV INTERNAL DIMENSION DIASTOLE INDEX: 2.94 CM/M2
ECHO LV INTERNAL DIMENSION DIASTOLIC: 5.2 CM (ref 4.2–5.9)
ECHO LV INTERNAL DIMENSION SYSTOLIC INDEX: 2.26 CM/M2
ECHO LV INTERNAL DIMENSION SYSTOLIC: 4 CM
ECHO LV IVSD: 0.7 CM (ref 0.6–1)
ECHO LV MASS 2D: 122.8 G (ref 88–224)
ECHO LV MASS INDEX 2D: 69.4 G/M2 (ref 49–115)
ECHO LV POSTERIOR WALL DIASTOLIC: 0.7 CM (ref 0.6–1)
ECHO LV RELATIVE WALL THICKNESS RATIO: 0.27
ECHO LVOT AREA: 3.8 CM2
ECHO LVOT AV VTI INDEX: 0.71
ECHO LVOT DIAM: 2.2 CM
ECHO LVOT MEAN GRADIENT: 1 MMHG
ECHO LVOT PEAK GRADIENT: 2 MMHG
ECHO LVOT PEAK VELOCITY: 0.7 M/S
ECHO LVOT STROKE VOLUME INDEX: 32.2 ML/M2
ECHO LVOT SV: 57 ML
ECHO LVOT VTI: 15 CM
ECHO MV A VELOCITY: 0.61 M/S
ECHO MV E DECELERATION TIME (DT): 273.8 MS
ECHO MV E VELOCITY: 0.39 M/S
ECHO MV E/A RATIO: 0.64
ECHO MV E/E' LATERAL: 9.49
ECHO MV E/E' RATIO (AVERAGED): 8.47
ECHO MV E/E' SEPTAL: 7.44
ECHO PULMONARY ARTERY END DIASTOLIC PRESSURE: 2 MMHG
ECHO PV MAX VELOCITY: 1.3 M/S
ECHO PV PEAK GRADIENT: 7 MMHG
ECHO PV REGURGITANT MAX VELOCITY: 0.8 M/S
ECHO RV FREE WALL PEAK S': 15.9 CM/S
ECHO RV INTERNAL DIMENSION: 3.8 CM
ECHO TV REGURGITANT MAX VELOCITY: 2.37 M/S
ECHO TV REGURGITANT PEAK GRADIENT: 23 MMHG
EOSINOPHIL # BLD: 0 K/UL (ref 0–0.4)
EOSINOPHIL NFR BLD: 0 % (ref 0–7)
ERYTHROCYTE [DISTWIDTH] IN BLOOD BY AUTOMATED COUNT: 19.7 % (ref 11.5–14.5)
GLOBULIN SER CALC-MCNC: 3.6 G/DL (ref 2–4)
GLUCOSE SERPL-MCNC: 151 MG/DL (ref 65–100)
HCT VFR BLD AUTO: 25.5 % (ref 36.6–50.3)
HGB BLD-MCNC: 7.2 G/DL (ref 12.1–17)
IMM GRANULOCYTES # BLD AUTO: 0 K/UL
IMM GRANULOCYTES NFR BLD AUTO: 0 %
LYMPHOCYTES # BLD: 0.32 K/UL (ref 0.8–3.5)
LYMPHOCYTES NFR BLD: 3 % (ref 12–49)
MCH RBC QN AUTO: 19.7 PG (ref 26–34)
MCHC RBC AUTO-ENTMCNC: 28.2 G/DL (ref 30–36.5)
MCV RBC AUTO: 69.9 FL (ref 80–99)
MONOCYTES # BLD: 0.65 K/UL (ref 0–1)
MONOCYTES NFR BLD: 6 % (ref 5–13)
MYELOCYTES NFR BLD MANUAL: 1 %
NEUTS BAND NFR BLD MANUAL: 5 % (ref 0–6)
NEUTS SEG # BLD: 9.72 K/UL (ref 1.8–8)
NEUTS SEG NFR BLD: 85 % (ref 32–75)
NRBC # BLD: 0 K/UL (ref 0–0.01)
NRBC BLD-RTO: 0 PER 100 WBC
PLATELET # BLD AUTO: 424 K/UL (ref 150–400)
PMV BLD AUTO: 10.5 FL (ref 8.9–12.9)
POTASSIUM SERPL-SCNC: 4.1 MMOL/L (ref 3.5–5.1)
PROT SERPL-MCNC: 6.6 G/DL (ref 6.4–8.2)
RBC # BLD AUTO: 3.65 M/UL (ref 4.1–5.7)
RBC MORPH BLD: ABNORMAL
SODIUM SERPL-SCNC: 135 MMOL/L (ref 136–145)
WBC # BLD AUTO: 10.8 K/UL (ref 4.1–11.1)

## 2025-04-12 PROCEDURE — 94668 MNPJ CHEST WALL SBSQ: CPT

## 2025-04-12 PROCEDURE — 93306 TTE W/DOPPLER COMPLETE: CPT

## 2025-04-12 PROCEDURE — 94640 AIRWAY INHALATION TREATMENT: CPT

## 2025-04-12 PROCEDURE — 6370000000 HC RX 637 (ALT 250 FOR IP): Performed by: HOSPITALIST

## 2025-04-12 PROCEDURE — 94761 N-INVAS EAR/PLS OXIMETRY MLT: CPT

## 2025-04-12 PROCEDURE — 80053 COMPREHEN METABOLIC PANEL: CPT

## 2025-04-12 PROCEDURE — 93306 TTE W/DOPPLER COMPLETE: CPT | Performed by: INTERNAL MEDICINE

## 2025-04-12 PROCEDURE — 6370000000 HC RX 637 (ALT 250 FOR IP): Performed by: PHYSICIAN ASSISTANT

## 2025-04-12 PROCEDURE — 6370000000 HC RX 637 (ALT 250 FOR IP): Performed by: FAMILY MEDICINE

## 2025-04-12 PROCEDURE — 6370000000 HC RX 637 (ALT 250 FOR IP): Performed by: INTERNAL MEDICINE

## 2025-04-12 PROCEDURE — 2500000003 HC RX 250 WO HCPCS: Performed by: HOSPITALIST

## 2025-04-12 PROCEDURE — 1100000000 HC RM PRIVATE

## 2025-04-12 PROCEDURE — 36415 COLL VENOUS BLD VENIPUNCTURE: CPT

## 2025-04-12 PROCEDURE — 2700000000 HC OXYGEN THERAPY PER DAY

## 2025-04-12 PROCEDURE — 6360000002 HC RX W HCPCS: Performed by: PHYSICIAN ASSISTANT

## 2025-04-12 PROCEDURE — 85025 COMPLETE CBC W/AUTO DIFF WBC: CPT

## 2025-04-12 PROCEDURE — 6360000002 HC RX W HCPCS: Performed by: HOSPITALIST

## 2025-04-12 PROCEDURE — 6360000002 HC RX W HCPCS: Performed by: INTERNAL MEDICINE

## 2025-04-12 PROCEDURE — 2500000003 HC RX 250 WO HCPCS: Performed by: INTERNAL MEDICINE

## 2025-04-12 RX ORDER — ALBUTEROL SULFATE 90 UG/1
2 INHALANT RESPIRATORY (INHALATION)
Status: DISCONTINUED | OUTPATIENT
Start: 2025-04-12 | End: 2025-04-12

## 2025-04-12 RX ORDER — FUROSEMIDE 10 MG/ML
20 INJECTION INTRAMUSCULAR; INTRAVENOUS ONCE
Status: COMPLETED | OUTPATIENT
Start: 2025-04-12 | End: 2025-04-12

## 2025-04-12 RX ORDER — IPRATROPIUM BROMIDE AND ALBUTEROL SULFATE 2.5; .5 MG/3ML; MG/3ML
1 SOLUTION RESPIRATORY (INHALATION)
Status: DISCONTINUED | OUTPATIENT
Start: 2025-04-12 | End: 2025-04-12

## 2025-04-12 RX ORDER — ALBUTEROL SULFATE 90 UG/1
2 INHALANT RESPIRATORY (INHALATION)
Status: DISCONTINUED | OUTPATIENT
Start: 2025-04-12 | End: 2025-04-14 | Stop reason: HOSPADM

## 2025-04-12 RX ADMIN — ALBUTEROL SULFATE 2 PUFF: 90 AEROSOL, METERED RESPIRATORY (INHALATION) at 09:28

## 2025-04-12 RX ADMIN — ALBUTEROL SULFATE 2 PUFF: 90 AEROSOL, METERED RESPIRATORY (INHALATION) at 12:42

## 2025-04-12 RX ADMIN — DOXYCYCLINE HYCLATE 100 MG: 100 TABLET, COATED ORAL at 10:39

## 2025-04-12 RX ADMIN — ALBUTEROL SULFATE 2 PUFF: 90 AEROSOL, METERED RESPIRATORY (INHALATION) at 21:03

## 2025-04-12 RX ADMIN — DOXYCYCLINE HYCLATE 100 MG: 100 TABLET, COATED ORAL at 20:56

## 2025-04-12 RX ADMIN — TAMSULOSIN HYDROCHLORIDE 0.4 MG: 0.4 CAPSULE ORAL at 21:03

## 2025-04-12 RX ADMIN — ENOXAPARIN SODIUM 40 MG: 100 INJECTION SUBCUTANEOUS at 10:39

## 2025-04-12 RX ADMIN — DONEPEZIL HYDROCHLORIDE 10 MG: 5 TABLET ORAL at 20:56

## 2025-04-12 RX ADMIN — CARVEDILOL 6.25 MG: 6.25 TABLET, FILM COATED ORAL at 20:56

## 2025-04-12 RX ADMIN — GUAIFENESIN 1200 MG: 600 TABLET ORAL at 20:56

## 2025-04-12 RX ADMIN — WATER 1000 MG: 1 INJECTION INTRAMUSCULAR; INTRAVENOUS; SUBCUTANEOUS at 23:37

## 2025-04-12 RX ADMIN — PREDNISOLONE ACETATE 1 DROP: 10 SUSPENSION/ DROPS OPHTHALMIC at 21:02

## 2025-04-12 RX ADMIN — CARVEDILOL 6.25 MG: 6.25 TABLET, FILM COATED ORAL at 10:39

## 2025-04-12 RX ADMIN — DEXAMETHASONE 6 MG: 6 TABLET ORAL at 18:47

## 2025-04-12 RX ADMIN — FUROSEMIDE 20 MG: 10 INJECTION, SOLUTION INTRAMUSCULAR; INTRAVENOUS at 10:39

## 2025-04-12 RX ADMIN — MEMANTINE 10 MG: 10 TABLET ORAL at 20:56

## 2025-04-12 RX ADMIN — POLYETHYLENE GLYCOL 3350 17 G: 17 POWDER, FOR SOLUTION ORAL at 18:51

## 2025-04-12 RX ADMIN — SODIUM CHLORIDE, PRESERVATIVE FREE 10 ML: 5 INJECTION INTRAVENOUS at 20:56

## 2025-04-12 RX ADMIN — FLUTICASONE PROPIONATE 1 PUFF: 110 AEROSOL, METERED RESPIRATORY (INHALATION) at 09:28

## 2025-04-12 RX ADMIN — SODIUM CHLORIDE, PRESERVATIVE FREE 10 ML: 5 INJECTION INTRAVENOUS at 10:40

## 2025-04-12 RX ADMIN — GUAIFENESIN 1200 MG: 600 TABLET ORAL at 10:39

## 2025-04-12 RX ADMIN — MEMANTINE 10 MG: 10 TABLET ORAL at 10:39

## 2025-04-12 ASSESSMENT — PULMONARY FUNCTION TESTS: PIF_VALUE: 15

## 2025-04-13 LAB
ALBUMIN SERPL-MCNC: 3 G/DL (ref 3.5–5)
ALBUMIN/GLOB SERPL: 0.9 (ref 1.1–2.2)
ALP SERPL-CCNC: 37 U/L (ref 45–117)
ALT SERPL-CCNC: 94 U/L (ref 12–78)
ANION GAP SERPL CALC-SCNC: 5 MMOL/L (ref 2–12)
AST SERPL-CCNC: 41 U/L (ref 15–37)
BASOPHILS # BLD: 0 K/UL (ref 0–0.1)
BASOPHILS NFR BLD: 0 % (ref 0–1)
BILIRUB SERPL-MCNC: 0.6 MG/DL (ref 0.2–1)
BUN SERPL-MCNC: 35 MG/DL (ref 6–20)
BUN/CREAT SERPL: 42 (ref 12–20)
CALCIUM SERPL-MCNC: 8.9 MG/DL (ref 8.5–10.1)
CHLORIDE SERPL-SCNC: 97 MMOL/L (ref 97–108)
CO2 SERPL-SCNC: 34 MMOL/L (ref 21–32)
CREAT SERPL-MCNC: 0.84 MG/DL (ref 0.7–1.3)
DIFFERENTIAL METHOD BLD: ABNORMAL
EOSINOPHIL # BLD: 0 K/UL (ref 0–0.4)
EOSINOPHIL NFR BLD: 0 % (ref 0–7)
ERYTHROCYTE [DISTWIDTH] IN BLOOD BY AUTOMATED COUNT: 19.5 % (ref 11.5–14.5)
GLOBULIN SER CALC-MCNC: 3.5 G/DL (ref 2–4)
GLUCOSE SERPL-MCNC: 142 MG/DL (ref 65–100)
HCT VFR BLD AUTO: 25.9 % (ref 36.6–50.3)
HGB BLD-MCNC: 7.5 G/DL (ref 12.1–17)
IMM GRANULOCYTES # BLD AUTO: 0 K/UL
IMM GRANULOCYTES NFR BLD AUTO: 0 %
LYMPHOCYTES # BLD: 1.05 K/UL (ref 0.8–3.5)
LYMPHOCYTES NFR BLD: 9 % (ref 12–49)
MAGNESIUM SERPL-MCNC: 2.2 MG/DL (ref 1.6–2.4)
MCH RBC QN AUTO: 20 PG (ref 26–34)
MCHC RBC AUTO-ENTMCNC: 29 G/DL (ref 30–36.5)
MCV RBC AUTO: 69.1 FL (ref 80–99)
MONOCYTES # BLD: 0.7 K/UL (ref 0–1)
MONOCYTES NFR BLD: 6 % (ref 5–13)
MYELOCYTES NFR BLD MANUAL: 2 %
NEUTS BAND NFR BLD MANUAL: 1 % (ref 0–6)
NEUTS SEG # BLD: 9.71 K/UL (ref 1.8–8)
NEUTS SEG NFR BLD: 82 % (ref 32–75)
NRBC # BLD: 0 K/UL (ref 0–0.01)
NRBC BLD-RTO: 0 PER 100 WBC
PHOSPHATE SERPL-MCNC: 4.7 MG/DL (ref 2.6–4.7)
PLATELET # BLD AUTO: 465 K/UL (ref 150–400)
PMV BLD AUTO: 10.3 FL (ref 8.9–12.9)
POTASSIUM SERPL-SCNC: 4.4 MMOL/L (ref 3.5–5.1)
PROCALCITONIN SERPL-MCNC: <0.05 NG/ML
PROT SERPL-MCNC: 6.5 G/DL (ref 6.4–8.2)
RBC # BLD AUTO: 3.75 M/UL (ref 4.1–5.7)
RBC MORPH BLD: ABNORMAL
SODIUM SERPL-SCNC: 136 MMOL/L (ref 136–145)
WBC # BLD AUTO: 11.7 K/UL (ref 4.1–11.1)

## 2025-04-13 PROCEDURE — 94660 CPAP INITIATION&MGMT: CPT

## 2025-04-13 PROCEDURE — 85025 COMPLETE CBC W/AUTO DIFF WBC: CPT

## 2025-04-13 PROCEDURE — 6370000000 HC RX 637 (ALT 250 FOR IP): Performed by: INTERNAL MEDICINE

## 2025-04-13 PROCEDURE — 84100 ASSAY OF PHOSPHORUS: CPT

## 2025-04-13 PROCEDURE — 1100000000 HC RM PRIVATE

## 2025-04-13 PROCEDURE — 2500000003 HC RX 250 WO HCPCS: Performed by: HOSPITALIST

## 2025-04-13 PROCEDURE — 6360000002 HC RX W HCPCS: Performed by: HOSPITALIST

## 2025-04-13 PROCEDURE — 83735 ASSAY OF MAGNESIUM: CPT

## 2025-04-13 PROCEDURE — 6370000000 HC RX 637 (ALT 250 FOR IP): Performed by: PHYSICIAN ASSISTANT

## 2025-04-13 PROCEDURE — 36415 COLL VENOUS BLD VENIPUNCTURE: CPT

## 2025-04-13 PROCEDURE — 94669 MECHANICAL CHEST WALL OSCILL: CPT

## 2025-04-13 PROCEDURE — 80053 COMPREHEN METABOLIC PANEL: CPT

## 2025-04-13 PROCEDURE — 84145 PROCALCITONIN (PCT): CPT

## 2025-04-13 PROCEDURE — 94640 AIRWAY INHALATION TREATMENT: CPT

## 2025-04-13 PROCEDURE — 94761 N-INVAS EAR/PLS OXIMETRY MLT: CPT

## 2025-04-13 PROCEDURE — 2700000000 HC OXYGEN THERAPY PER DAY

## 2025-04-13 RX ADMIN — ALBUTEROL SULFATE 2 PUFF: 90 AEROSOL, METERED RESPIRATORY (INHALATION) at 14:55

## 2025-04-13 RX ADMIN — DEXAMETHASONE 6 MG: 6 TABLET ORAL at 17:34

## 2025-04-13 RX ADMIN — CARVEDILOL 6.25 MG: 6.25 TABLET, FILM COATED ORAL at 21:03

## 2025-04-13 RX ADMIN — ENOXAPARIN SODIUM 40 MG: 100 INJECTION SUBCUTANEOUS at 08:26

## 2025-04-13 RX ADMIN — MEMANTINE 10 MG: 10 TABLET ORAL at 21:03

## 2025-04-13 RX ADMIN — DONEPEZIL HYDROCHLORIDE 10 MG: 5 TABLET ORAL at 21:03

## 2025-04-13 RX ADMIN — GUAIFENESIN 1200 MG: 600 TABLET ORAL at 21:03

## 2025-04-13 RX ADMIN — CARVEDILOL 6.25 MG: 6.25 TABLET, FILM COATED ORAL at 08:28

## 2025-04-13 RX ADMIN — MEMANTINE 10 MG: 10 TABLET ORAL at 08:32

## 2025-04-13 RX ADMIN — SODIUM CHLORIDE, PRESERVATIVE FREE 10 ML: 5 INJECTION INTRAVENOUS at 08:26

## 2025-04-13 RX ADMIN — SODIUM CHLORIDE, PRESERVATIVE FREE 10 ML: 5 INJECTION INTRAVENOUS at 21:03

## 2025-04-13 RX ADMIN — PREDNISOLONE ACETATE 1 DROP: 10 SUSPENSION/ DROPS OPHTHALMIC at 21:03

## 2025-04-13 RX ADMIN — TAMSULOSIN HYDROCHLORIDE 0.4 MG: 0.4 CAPSULE ORAL at 21:03

## 2025-04-13 RX ADMIN — GUAIFENESIN 1200 MG: 600 TABLET ORAL at 08:33

## 2025-04-13 RX ADMIN — ALBUTEROL SULFATE 2 PUFF: 90 AEROSOL, METERED RESPIRATORY (INHALATION) at 07:52

## 2025-04-13 RX ADMIN — ALBUTEROL SULFATE 2 PUFF: 90 AEROSOL, METERED RESPIRATORY (INHALATION) at 19:44

## 2025-04-14 VITALS
SYSTOLIC BLOOD PRESSURE: 126 MMHG | HEIGHT: 65 IN | HEART RATE: 63 BPM | BODY MASS INDEX: 27.02 KG/M2 | OXYGEN SATURATION: 95 % | DIASTOLIC BLOOD PRESSURE: 49 MMHG | WEIGHT: 162.2 LBS | TEMPERATURE: 98.2 F | RESPIRATION RATE: 14 BRPM

## 2025-04-14 LAB
ALBUMIN SERPL-MCNC: 3 G/DL (ref 3.5–5)
ALBUMIN/GLOB SERPL: 0.9 (ref 1.1–2.2)
ALP SERPL-CCNC: 35 U/L (ref 45–117)
ALT SERPL-CCNC: 97 U/L (ref 12–78)
ANION GAP SERPL CALC-SCNC: 4 MMOL/L (ref 2–12)
AST SERPL-CCNC: 47 U/L (ref 15–37)
BASOPHILS # BLD: 0 K/UL (ref 0–0.1)
BASOPHILS NFR BLD: 0 % (ref 0–1)
BILIRUB SERPL-MCNC: 0.9 MG/DL (ref 0.2–1)
BUN SERPL-MCNC: 33 MG/DL (ref 6–20)
BUN/CREAT SERPL: 38 (ref 12–20)
CALCIUM SERPL-MCNC: 8.9 MG/DL (ref 8.5–10.1)
CHLORIDE SERPL-SCNC: 98 MMOL/L (ref 97–108)
CO2 SERPL-SCNC: 34 MMOL/L (ref 21–32)
CREAT SERPL-MCNC: 0.88 MG/DL (ref 0.7–1.3)
DIFFERENTIAL METHOD BLD: ABNORMAL
EOSINOPHIL # BLD: 0 K/UL (ref 0–0.4)
EOSINOPHIL NFR BLD: 0 % (ref 0–7)
ERYTHROCYTE [DISTWIDTH] IN BLOOD BY AUTOMATED COUNT: 20.1 % (ref 11.5–14.5)
GLOBULIN SER CALC-MCNC: 3.2 G/DL (ref 2–4)
GLUCOSE SERPL-MCNC: 147 MG/DL (ref 65–100)
HCT VFR BLD AUTO: 27.8 % (ref 36.6–50.3)
HGB BLD-MCNC: 7.7 G/DL (ref 12.1–17)
IMM GRANULOCYTES # BLD AUTO: 0 K/UL
IMM GRANULOCYTES NFR BLD AUTO: 0 %
LYMPHOCYTES # BLD: 0.88 K/UL (ref 0.8–3.5)
LYMPHOCYTES NFR BLD: 8 % (ref 12–49)
MAGNESIUM SERPL-MCNC: 2.3 MG/DL (ref 1.6–2.4)
MCH RBC QN AUTO: 19.4 PG (ref 26–34)
MCHC RBC AUTO-ENTMCNC: 27.7 G/DL (ref 30–36.5)
MCV RBC AUTO: 70 FL (ref 80–99)
MONOCYTES # BLD: 0.77 K/UL (ref 0–1)
MONOCYTES NFR BLD: 7 % (ref 5–13)
NEUTS BAND NFR BLD MANUAL: 2 % (ref 0–6)
NEUTS SEG # BLD: 9.35 K/UL (ref 1.8–8)
NEUTS SEG NFR BLD: 83 % (ref 32–75)
NRBC # BLD: 0 K/UL (ref 0–0.01)
NRBC BLD-RTO: 0 PER 100 WBC
PHOSPHATE SERPL-MCNC: 4.3 MG/DL (ref 2.6–4.7)
PLATELET # BLD AUTO: 504 K/UL (ref 150–400)
PMV BLD AUTO: 11.1 FL (ref 8.9–12.9)
POTASSIUM SERPL-SCNC: 4.9 MMOL/L (ref 3.5–5.1)
PROT SERPL-MCNC: 6.2 G/DL (ref 6.4–8.2)
RBC # BLD AUTO: 3.97 M/UL (ref 4.1–5.7)
RBC MORPH BLD: ABNORMAL
SODIUM SERPL-SCNC: 136 MMOL/L (ref 136–145)
WBC # BLD AUTO: 11 K/UL (ref 4.1–11.1)

## 2025-04-14 PROCEDURE — 2500000003 HC RX 250 WO HCPCS: Performed by: HOSPITALIST

## 2025-04-14 PROCEDURE — 84100 ASSAY OF PHOSPHORUS: CPT

## 2025-04-14 PROCEDURE — 6360000002 HC RX W HCPCS: Performed by: HOSPITALIST

## 2025-04-14 PROCEDURE — 94761 N-INVAS EAR/PLS OXIMETRY MLT: CPT

## 2025-04-14 PROCEDURE — 83735 ASSAY OF MAGNESIUM: CPT

## 2025-04-14 PROCEDURE — 2700000000 HC OXYGEN THERAPY PER DAY

## 2025-04-14 PROCEDURE — 6370000000 HC RX 637 (ALT 250 FOR IP): Performed by: INTERNAL MEDICINE

## 2025-04-14 PROCEDURE — 94010 BREATHING CAPACITY TEST: CPT

## 2025-04-14 PROCEDURE — 94668 MNPJ CHEST WALL SBSQ: CPT

## 2025-04-14 PROCEDURE — 94640 AIRWAY INHALATION TREATMENT: CPT

## 2025-04-14 PROCEDURE — 85025 COMPLETE CBC W/AUTO DIFF WBC: CPT

## 2025-04-14 PROCEDURE — 94660 CPAP INITIATION&MGMT: CPT

## 2025-04-14 PROCEDURE — 6370000000 HC RX 637 (ALT 250 FOR IP): Performed by: PHYSICIAN ASSISTANT

## 2025-04-14 PROCEDURE — 80053 COMPREHEN METABOLIC PANEL: CPT

## 2025-04-14 RX ORDER — AMLODIPINE BESYLATE 5 MG/1
5 TABLET ORAL DAILY
Qty: 30 TABLET | Refills: 0 | Status: SHIPPED
Start: 2025-04-14

## 2025-04-14 RX ORDER — GUAIFENESIN/DEXTROMETHORPHAN 100-10MG/5
5 SYRUP ORAL EVERY 4 HOURS PRN
Qty: 120 ML | Refills: 0 | Status: SHIPPED
Start: 2025-04-14 | End: 2025-04-24

## 2025-04-14 RX ORDER — DEXAMETHASONE 6 MG/1
6 TABLET ORAL EVERY 24 HOURS
Qty: 2 TABLET | Refills: 0 | Status: SHIPPED
Start: 2025-04-14 | End: 2025-04-16

## 2025-04-14 RX ORDER — GUAIFENESIN 600 MG/1
1200 TABLET, EXTENDED RELEASE ORAL 2 TIMES DAILY
Qty: 20 TABLET | Refills: 0 | Status: SHIPPED
Start: 2025-04-14 | End: 2025-04-19

## 2025-04-14 RX ORDER — POLYETHYLENE GLYCOL 3350 17 G/17G
17 POWDER, FOR SOLUTION ORAL DAILY PRN
Qty: 527 G | Refills: 0 | Status: SHIPPED
Start: 2025-04-14 | End: 2025-05-14

## 2025-04-14 RX ADMIN — ALBUTEROL SULFATE 2 PUFF: 90 AEROSOL, METERED RESPIRATORY (INHALATION) at 14:53

## 2025-04-14 RX ADMIN — FLUTICASONE PROPIONATE 1 PUFF: 110 AEROSOL, METERED RESPIRATORY (INHALATION) at 09:00

## 2025-04-14 RX ADMIN — GUAIFENESIN 1200 MG: 600 TABLET ORAL at 09:05

## 2025-04-14 RX ADMIN — CARVEDILOL 6.25 MG: 6.25 TABLET, FILM COATED ORAL at 09:05

## 2025-04-14 RX ADMIN — DEXAMETHASONE 6 MG: 6 TABLET ORAL at 17:36

## 2025-04-14 RX ADMIN — ALBUTEROL SULFATE 2 PUFF: 90 AEROSOL, METERED RESPIRATORY (INHALATION) at 08:52

## 2025-04-14 RX ADMIN — SODIUM CHLORIDE, PRESERVATIVE FREE 10 ML: 5 INJECTION INTRAVENOUS at 09:07

## 2025-04-14 RX ADMIN — TIOTROPIUM BROMIDE INHALATION SPRAY 2 PUFF: 3.12 SPRAY, METERED RESPIRATORY (INHALATION) at 09:28

## 2025-04-14 RX ADMIN — MEMANTINE 10 MG: 10 TABLET ORAL at 09:05

## 2025-04-14 RX ADMIN — ENOXAPARIN SODIUM 40 MG: 100 INJECTION SUBCUTANEOUS at 09:06

## 2025-04-14 ASSESSMENT — COPD QUESTIONNAIRES
GOLD_GROUP: GROUP B
TOTAL_EXACERBATIONS_PASTYEAR: 0

## 2025-04-14 NOTE — DISCHARGE INSTRUCTIONS
TRANSFER  INSTRUCTIONS    NAME: Kevan Lynne   :  12/10/1934   MRN:  [id number]     Date:    2025     ADMIT DATE: 2025     TRANSFER DATE: 2025     DISPOSITION: Rehab    DISCHARGE DIAGNOSIS:    Acute on chronic hypoxic respiratory failure    BPH (benign prostatic hyperplasia)    Alzheimer's dementia without behavioral disturbance (HCC)    COPD with acute exacerbation (HCC)    HTN (hypertension), benign    Pneumonia due to COVID-19 virus    Acute on chronic heart failure with preserved ejection fraction (HFpEF) (HCC)    Iron deficiency anemia due to chronic blood loss    MEDICATIONS: See medication list on the AVS    Recommended diet:  regular diet    Recommended activity: Activity as tolerated    Follow Up:    Cierra Isaac MD  [address] [address] [address] [phone]    Schedule an appointment as soon as possible for a visit  To schedule follow up after rehab      Information obtained by :  I understand that if any problems occur once I am at home I am to contact my physician.    I understand and acknowledge receipt of the instructions indicated above.                                                                                                                                           Physician's or R.N.'s Signature                                                                  Date/Time                                                                                                                                              Patient or Representative Signature                                                          Date/Time

## 2025-04-14 NOTE — CARE COORDINATION
12:17 PM  [facility] has accepted Pt. Facility can accept Pt after 4pm.   CM contacted Pt's granddaughter Nirmala to notify of this. Nirmala is agreeable to Pt discharging to [facility]. Pt's granddaughter is to transport Pt this evening. Pt's granddaughter is to bring Pt's personal O2 tank.         SOHAN Sims, CM  [facility] Care Manager  [phone]

## 2025-04-14 NOTE — PLAN OF CARE
Problem: Chronic Conditions and Co-morbidities  Goal: Patient's chronic conditions and co-morbidity symptoms are monitored and maintained or improved  4/7/2025 0853 by Ching Mesa RN  Outcome: Progressing  4/7/2025 0820 by Damico, Kevin J, RT  Outcome: Progressing  4/7/2025 0046 by Delroy Sheppard RN  Outcome: Progressing  Flowsheets (Taken 4/7/2025 0046)  Care Plan - Patient's Chronic Conditions and Co-Morbidity Symptoms are Monitored and Maintained or Improved: Monitor and assess patient's chronic conditions and comorbid symptoms for stability, deterioration, or improvement     Problem: Discharge Planning  Goal: Discharge to home or other facility with appropriate resources  4/7/2025 0853 by Ching Mesa RN  Outcome: Progressing  4/7/2025 0046 by Delroy Sheppard RN  Outcome: Progressing  Flowsheets (Taken 4/7/2025 0046)  Discharge to home or other facility with appropriate resources: Identify barriers to discharge with patient and caregiver     Problem: Safety - Adult  Goal: Free from fall injury  Outcome: Progressing     
  Problem: Chronic Conditions and Co-morbidities  Goal: Patient's chronic conditions and co-morbidity symptoms are monitored and maintained or improved  4/9/2025 0817 by Muna Thomas RN  Outcome: Progressing  4/8/2025 2337 by Jo Ann Bella RN  Outcome: Progressing     Problem: Discharge Planning  Goal: Discharge to home or other facility with appropriate resources  4/9/2025 0817 by Muna Thomas RN  Outcome: Progressing  4/8/2025 2337 by Jo Ann Bella RN  Outcome: Progressing     Problem: Safety - Adult  Goal: Free from fall injury  4/9/2025 0817 by Muna Thomas RN  Outcome: Progressing  4/8/2025 2337 by Jo Ann Bella RN  Outcome: Progressing     Problem: Skin/Tissue Integrity  Goal: Skin integrity remains intact  Description: 1.  Monitor for areas of redness and/or skin breakdown  2.  Assess vascular access sites hourly  3.  Every 4-6 hours minimum:  Change oxygen saturation probe site  4.  Every 4-6 hours:  If on nasal continuous positive airway pressure, respiratory therapy assess nares and determine need for appliance change or resting period  4/9/2025 0817 by Muna Thomas RN  Outcome: Progressing  4/8/2025 2337 by Jo Ann Bella RN  Outcome: Progressing     Problem: Respiratory - Adult  Goal: Achieves optimal ventilation and oxygenation  4/9/2025 0817 by Muna Thomas RN  Outcome: Progressing  4/9/2025 0101 by Andie Brown RCP  Outcome: Progressing  4/8/2025 2337 by Jo Ann Bella RN  Outcome: Progressing     
  Problem: Chronic Conditions and Co-morbidities  Goal: Patient's chronic conditions and co-morbidity symptoms are monitored and maintained or improved  4/9/2025 2103 by Kaitlynn Hernandez RN  Outcome: Progressing  Flowsheets (Taken 4/9/2025 2000)  Care Plan - Patient's Chronic Conditions and Co-Morbidity Symptoms are Monitored and Maintained or Improved: Monitor and assess patient's chronic conditions and comorbid symptoms for stability, deterioration, or improvement  4/9/2025 0817 by Muna Thomas RN  Outcome: Progressing     Problem: Discharge Planning  Goal: Discharge to home or other facility with appropriate resources  4/9/2025 2103 by Kaitlynn Hernandez RN  Outcome: Progressing  Flowsheets (Taken 4/9/2025 2000)  Discharge to home or other facility with appropriate resources: Identify barriers to discharge with patient and caregiver  4/9/2025 0817 by Muna Thomas RN  Outcome: Progressing     Problem: Safety - Adult  Goal: Free from fall injury  4/9/2025 2103 by Kaitlynn Hernandez RN  Outcome: Progressing  4/9/2025 0817 by Muna Thomas RN  Outcome: Progressing     Problem: Skin/Tissue Integrity  Goal: Skin integrity remains intact  Description: 1.  Monitor for areas of redness and/or skin breakdown  2.  Assess vascular access sites hourly  3.  Every 4-6 hours minimum:  Change oxygen saturation probe site  4.  Every 4-6 hours:  If on nasal continuous positive airway pressure, respiratory therapy assess nares and determine need for appliance change or resting period  4/9/2025 2103 by Kaitlynn Hernandez RN  Outcome: Progressing  Flowsheets (Taken 4/9/2025 2000)  Skin Integrity Remains Intact: Monitor for areas of redness and/or skin breakdown  4/9/2025 0817 by Muna Thomas RN  Outcome: Progressing     Problem: Respiratory - Adult  Goal: Achieves optimal ventilation and oxygenation  4/9/2025 2103 by Kaitlynn Hernandez RN  Outcome: Progressing  Flowsheets (Taken 4/9/2025 2000)  Achieves optimal ventilation and oxygenation: 
  Problem: Chronic Conditions and Co-morbidities  Goal: Patient's chronic conditions and co-morbidity symptoms are monitored and maintained or improved  Outcome: Progressing     Problem: Discharge Planning  Goal: Discharge to home or other facility with appropriate resources  Outcome: Progressing     Problem: Safety - Adult  Goal: Free from fall injury  Outcome: Progressing     Problem: Skin/Tissue Integrity  Goal: Skin integrity remains intact  Description: 1.  Monitor for areas of redness and/or skin breakdown  2.  Assess vascular access sites hourly  3.  Every 4-6 hours minimum:  Change oxygen saturation probe site  4.  Every 4-6 hours:  If on nasal continuous positive airway pressure, respiratory therapy assess nares and determine need for appliance change or resting period  Outcome: Progressing     Problem: Respiratory - Adult  Goal: Achieves optimal ventilation and oxygenation  4/8/2025 1118 by Muna Thomas RN  Outcome: Progressing  4/8/2025 0943 by Julianna Subramanian, RT  Outcome: Progressing     
  Problem: Chronic Conditions and Co-morbidities  Goal: Patient's chronic conditions and co-morbidity symptoms are monitored and maintained or improved  Outcome: Progressing  Flowsheets (Taken 4/13/2025 0900 by Libia Riddle, RN)  Care Plan - Patient's Chronic Conditions and Co-Morbidity Symptoms are Monitored and Maintained or Improved: Monitor and assess patient's chronic conditions and comorbid symptoms for stability, deterioration, or improvement     Problem: Discharge Planning  Goal: Discharge to home or other facility with appropriate resources  Outcome: Progressing  Flowsheets (Taken 4/13/2025 0900 by Libia Riddle, RN)  Discharge to home or other facility with appropriate resources: Identify barriers to discharge with patient and caregiver     Problem: Safety - Adult  Goal: Free from fall injury  Outcome: Progressing     Problem: Skin/Tissue Integrity  Goal: Skin integrity remains intact  Description: 1.  Monitor for areas of redness and/or skin breakdown  2.  Assess vascular access sites hourly  3.  Every 4-6 hours minimum:  Change oxygen saturation probe site  4.  Every 4-6 hours:  If on nasal continuous positive airway pressure, respiratory therapy assess nares and determine need for appliance change or resting period  Outcome: Progressing  Flowsheets (Taken 4/13/2025 0900 by Libia Riddle, RN)  Skin Integrity Remains Intact: Monitor for areas of redness and/or skin breakdown     Problem: Respiratory - Adult  Goal: Achieves optimal ventilation and oxygenation  4/13/2025 2151 by Gini Garcia, RN  Outcome: Progressing  Flowsheets (Taken 4/13/2025 0900 by Libia Riddle, RN)  Achieves optimal ventilation and oxygenation: Assess for changes in respiratory status  4/13/2025 0800 by Sonali Wright, RT  Outcome: Progressing     Problem: ABCDS Injury Assessment  Goal: Absence of physical injury  Outcome: Progressing     
  Problem: Chronic Conditions and Co-morbidities  Goal: Patient's chronic conditions and co-morbidity symptoms are monitored and maintained or improved  Outcome: Progressing  Flowsheets (Taken 4/7/2025 0046)  Care Plan - Patient's Chronic Conditions and Co-Morbidity Symptoms are Monitored and Maintained or Improved: Monitor and assess patient's chronic conditions and comorbid symptoms for stability, deterioration, or improvement     Problem: Discharge Planning  Goal: Discharge to home or other facility with appropriate resources  Outcome: Progressing  Flowsheets (Taken 4/7/2025 0046)  Discharge to home or other facility with appropriate resources: Identify barriers to discharge with patient and caregiver     
  Problem: Occupational Therapy - Adult  Goal: By Discharge: Performs self-care activities at highest level of function for planned discharge setting.  See evaluation for individualized goals.  Description: FUNCTIONAL STATUS PRIOR TO ADMISSION:  Note patient has baseline dementia.  History provided by his granddaughter.  She modified her home so patient could live with her in a walk-out basement set-up with a level entry and walk-in shower.  At his recent baseline, he was ambulatory without AD and performed ADLs at set-up/ supervision to independent level.   He has had a progressive functional decline since December 2024 with increased weakness, increased RICKETTS, and decreased ability to sequence/ complete basic ADLs.  No falls. On 2-3L/min O2 NC.    HOME SUPPORT: Patient resided at home with his granddaughter and her , who both work but also provide support/ assistance.    Occupational Therapy Goals:  Initiated 4/11/2025  1.  Patient will perform grooming standing at sink with Contact Guard Assist within 7 day(s).  2.  Patient will perform lower body dressing with Minimal Assist within 7 day(s).  3.  Patient will perform bathing with Minimal Assist within 7 day(s).  4.  Patient will perform toilet transfers with Contact Guard Assist  within 7 day(s).  5.  Patient will perform all aspects of toileting with Minimal Assist within 7 day(s).  6.  Patient will participate in upper extremity therapeutic exercise/activities with Supervision for 10 minutes within 7 day(s).    7.  Patient will utilize energy conservation techniques during functional activities with verbal cues within 7 day(s).    Outcome: Progressing     OCCUPATIONAL THERAPY EVALUATION    Patient: Kevan Lynne (89 y.o. male)  Date: 4/11/2025  Primary Diagnosis: COPD exacerbation (HCC) [J44.1]  Acute on chronic hypoxic respiratory failure [J96.21]  COVID-19 [U07.1]         Precautions: Fall Risk (Droplet +, Covid +)                  ASSESSMENT :  Note 
  Problem: Respiratory - Adult  Goal: Achieves optimal ventilation and oxygenation  4/10/2025 0941 by Gini Martin RCP  Outcome: Progressing  Flowsheets  Taken 4/10/2025 0400 by Kaitlynn Hernandez RN  Achieves optimal ventilation and oxygenation: Assess for changes in respiratory status  Taken 4/10/2025 0000 by Kaitlynn Hernandez RN  Achieves optimal ventilation and oxygenation: Assess for changes in respiratory status  4/9/2025 2103 by Kaitlynn Hernandez RN  Outcome: Progressing  Flowsheets (Taken 4/9/2025 2000)  Achieves optimal ventilation and oxygenation: Assess for changes in respiratory status     
Walk-in shower  Bathroom Toilet: Standard  Bathroom Equipment: Grab bars in shower  Home Equipment: Oxygen (on 2 liters oxygen continuously)  Receives Help From: Family  Prior Level of Assist for ADLs: Needs assistance  Bath: Moderate assistance (just recently)  Dressing: Moderate assistance (just recently)  Grooming: Moderate assistance  Feeding: Independent  Toileting: Needs assistance (needs cues from family)  Prior Level of Assist for Homemaking: Needs assistance  Meal Prep: Total  Laundry: Total  Vacuuming: Total  Cleaning: Total  Driving: Total  Shopping: Total  Homemaking Responsibilities: No  Prior Level of Assist for Transfers: Needs assistance (needs cues from family)  Active : No  Patient's  Info: family  Occupation: Retired    Cognitive/Behavioral Status:  Orientation  Overall Orientation Status: Impaired  Orientation Level: Oriented to person;Disoriented to place;Disoriented to time;Disoriented to situation          Strength:    Strength: Generally decreased, functional    Tone & Sensation:   Tone: Normal  Sensation: Intact    Coordination:       Range Of Motion:  AROM: Within functional limits       Functional Mobility:  Bed Mobility:     Bed Mobility Training  Bed Mobility Training: Yes  Supine to Sit: Minimal assistance  Transfers:     Transfer Training  Transfer Training: Yes  Sit to Stand: Contact guard assistance  Stand to Sit: Minimal assistance  Bed to Chair: Minimal assistance  Balance:               Balance  Sitting: Intact  Standing: Impaired  Standing - Static: Fair  Standing - Dynamic: Fair  Ambulation/Gait Training:                       Gait  Gait Training: Yes  Overall Level of Assistance: Minimal assistance  Distance (ft): 3 Feet  Assistive Device: Gait belt  Step Length: Right shortened;Left shortened  Gait Abnormalities: Decreased step clearance;Trunk sway increased;Path deviations

## 2025-04-14 NOTE — DISCHARGE SUMMARY
S2 without thrills, bruits   Abd:    soft, non-tender, normoactive bowel sounds  Skin: no rashes and skin turgor is good  Neuro: awake, alert and has a non focal     Activity: Activity as tolerated    Diet: regular diet    Current Discharge Medication List        START taking these medications    Details   guaiFENesin-dextromethorphan (ROBITUSSIN DM) 100-10 MG/5ML syrup Take 5 mLs by mouth every 4 hours as needed for Cough  Qty: 120 mL, Refills: 0      guaiFENesin (MUCINEX) 600 MG extended release tablet Take 2 tablets by mouth 2 times daily for 5 days  Qty: 20 tablet, Refills: 0      polyethylene glycol (GLYCOLAX) 17 g packet Take 1 packet by mouth daily as needed for Constipation  Qty: 527 g, Refills: 0      dexAMETHasone (DECADRON) 6 MG tablet Take 1 tablet by mouth every 24 hours for 2 doses  Qty: 2 tablet, Refills: 0      amLODIPine (NORVASC) 5 MG tablet Take 1 tablet by mouth daily  Qty: 30 tablet, Refills: 0           CONTINUE these medications which have NOT CHANGED    Details   prednisoLONE acetate (PRED FORTE) 1 % ophthalmic suspension Place 1 drop into the right eye Daily      tiotropium (SPIRIVA HANDIHALER) 18 MCG inhalation capsule Inhale 1 capsule into the lungs daily      fluticasone-salmeterol (WIXELA INHUB) 250-50 MCG/ACT AEPB diskus inhaler Inhale 1 puff into the lungs in the morning and 1 puff in the evening.      donepezil (ARICEPT) 10 MG tablet TAKE 1 TABLET BY MOUTH EVERY NIGHT  Qty: 90 tablet, Refills: 1    Associated Diagnoses: Alzheimer's dementia without behavioral disturbance, psychotic disturbance, mood disturbance, or anxiety, unspecified dementia severity, unspecified timing of dementia onset ([facility])      memantine (NAMENDA) 10 MG tablet Take 1 tablet by mouth 2 times daily  Qty: 180 tablet, Refills: 1    Associated Diagnoses: Alzheimer's dementia without behavioral disturbance, psychotic disturbance, mood disturbance, or anxiety, unspecified dementia severity, unspecified timing of

## 2025-04-14 NOTE — CARE COORDINATION
CM notified of Pt discharging today to [facility].   Nursing to call report to: [phone]  Accepting Physician: Dr. Bond     Pt's daughter to transport  to the facility w/ personal O2 tank.     No further discharge needs indicated at this time. Pt is cleared from CM standpoint.     SOHAN Sims, CM  [facility] Care Manager  [phone]

## 2025-04-15 ENCOUNTER — TELEPHONE (OUTPATIENT)
Facility: CLINIC | Age: 89
End: 2025-04-15

## 2025-04-15 ENCOUNTER — CLINICAL DOCUMENTATION (OUTPATIENT)
Facility: CLINIC | Age: 89
End: 2025-04-15

## 2025-04-15 NOTE — TELEPHONE ENCOUNTER
----- Message from Migel DYE sent at 4/15/2025 12:52 PM EDT -----  Regarding: [facility] Appointment Request  [facility] Appointment Request    Patient needs appointment for [facility] Appointment Type: New to Provider.    Patient Requested Dates(s): as soon as possible  Patient Requested Time: anytime  Provider Name: any available  provider    Reason for Appointment Request: New Patient - Available appointments did not meet patient need  --------------------------------------------------------------------------------------------------------------------------    Relationship to Patient: Covered Entity Sherrie ()     Call Back Information: OK to leave message on voicemail  Preferred Call Back Number: Phone Telephone Information:  Mobile          [phone]  [address]          [phone] (Sherrie's Phone number)       Patient has a new patient appointment on 10/16/2025 with Gini Lyman APRN - CNP but they would like to move it as soon as possible

## 2025-04-15 NOTE — PROGRESS NOTES
DEEPA CLOÓN [facility]  [address] [place] [address]  [phone]      Hospitalist  Progress Note      NAME:       Kevan Lynne   :        12/10/1934  MRM:        [id number]    Date of service: 2025      Subjective: Patient seen and examined by me. Patient admitted with acute on chronic respiratory failure due to COVID-19 and COPD exacerbation. He was restless most of the night as per report and now sleeping, on BiPAP. Discussed with his nurse.       Objective:    Vital Signs:    BP (!) 180/81   Pulse (!) 107   Temp 97.5 °F (36.4 °C) (Oral)   Resp 22   Ht 1.651 m (5' 5\")   Wt 81.6 kg (180 lb)   SpO2 95%   BMI 29.95 kg/m²        Intake/Output Summary (Last 24 hours) at 2025 0952  Last data filed at 2025 0600  Gross per 24 hour   Intake --   Output 1635 ml   Net -1635 ml        Current inpatient medications reviewed:  Current Facility-Administered Medications   Medication Dose Route Frequency    doxycycline (VIBRAMYCIN) 100 mg in sodium chloride 0.9 % 100 mL IVPB (addEASE)  100 mg IntraVENous Q12H    furosemide (LASIX) injection 20 mg  20 mg IntraVENous BID    cefTRIAXone (ROCEPHIN) 1,000 mg in sterile water 10 mL IV syringe  1,000 mg IntraVENous Q24H    albuterol (ACCUNEB) nebulizer solution 2.5 mg  2.5 mg Nebulization Q6H PRN    budesonide (PULMICORT) nebulizer suspension 500 mcg  0.5 mg Nebulization BID RT    And    fluticasone (FLOVENT HFA) 110 MCG/ACT inhaler 1 puff  1 puff Inhalation BID RT    ipratropium 0.5 mg-albuterol 2.5 mg (DUONEB) nebulizer solution 1 Dose  1 Dose Inhalation Q4H WA RT    And    albuterol sulfate HFA (PROVENTIL;VENTOLIN;PROAIR) 108 (90 Base) MCG/ACT inhaler 2 puff  2 puff Inhalation Q4H WA RT    sodium chloride flush 0.9 % injection 5-40 mL  5-40 mL IntraVENous 2 times per day    sodium chloride flush 0.9 % injection 5-40 mL  5-40 mL IntraVENous PRN    0.9 % sodium chloride 
                                                             DEEPA COLÓN [facility]  [address] [place] [address]  [phone]      Hospitalist  Progress Note      NAME:       Kevan Lynne   :        12/10/1934  MRM:        [id number]    Date of service: 2025      Subjective: Patient seen and examined by me. Patient admitted with acute on chronic respiratory failure due to COVID-19 and COPD exacerbation. He remains symptomatic. Coughing. Weak.      Objective:    Vital Signs:    /73   Pulse 77   Temp 98 °F (36.7 °C) (Oral)   Resp 23   Ht 1.651 m (5' 5\")   Wt 81.5 kg (179 lb 10.8 oz)   SpO2 97%   BMI 29.90 kg/m²        Intake/Output Summary (Last 24 hours) at 2025 0717  Last data filed at 2025 0305  Gross per 24 hour   Intake --   Output 1450 ml   Net -1450 ml        Current inpatient medications reviewed:  Current Facility-Administered Medications   Medication Dose Route Frequency    ipratropium 0.5 mg-albuterol 2.5 mg (DUONEB) nebulizer solution 1 Dose  1 Dose Inhalation Q4H WA RT    ipratropium 0.5 mg-albuterol 2.5 mg (DUONEB) nebulizer solution 1 Dose  1 Dose Inhalation Q4H PRN    furosemide (LASIX) injection 20 mg  20 mg IntraVENous BID    [START ON 2025] cefTRIAXone (ROCEPHIN) 1,000 mg in sterile water 10 mL IV syringe  1,000 mg IntraVENous Q24H    azithromycin (ZITHROMAX) 500 mg in sodium chloride 0.9 % 250 mL IVPB ([id number])  500 mg IntraVENous Q24H    sodium chloride flush 0.9 % injection 5-40 mL  5-40 mL IntraVENous 2 times per day    sodium chloride flush 0.9 % injection 5-40 mL  5-40 mL IntraVENous PRN    0.9 % sodium chloride infusion   IntraVENous PRN    potassium chloride (KLOR-CON) extended release tablet 40 mEq  40 mEq Oral PRN    Or    potassium bicarb-citric acid (EFFER-K) effervescent tablet 40 mEq  40 mEq Oral PRN    Or    potassium chloride 10 mEq/100 mL IVPB (Peripheral Line)  10 mEq IntraVENous PRN    magnesium sulfate 2000 mg 
                                                             DEEPA COLÓN [facility]  [address] [place] [address]  [phone]      Hospitalist  Progress Note      NAME:       Kevan Lynne   :        12/10/1934  MRM:        [id number]    Date of service: 2025      Subjective: Patient seen and examined by me. Patient admitted with acute respiratory failure due to COVID pneumonia. Much better but still requiring oxygen. Tolerating his diet. Discussed with his nurse who state they noted melanotic stools.      Objective:    Vital Signs:    BP (!) 122/47   Pulse 59   Temp 97.9 °F (36.6 °C)   Resp 16   Ht 1.651 m (5' 5\")   Wt 73.6 kg (162 lb 3.2 oz)   SpO2 98%   BMI 26.99 kg/m²        Intake/Output Summary (Last 24 hours) at 2025 1226  Last data filed at 2025 1217  Gross per 24 hour   Intake 430 ml   Output 875 ml   Net -445 ml        Current inpatient medications reviewed:  Current Facility-Administered Medications   Medication Dose Route Frequency    tiotropium (SPIRIVA RESPIMAT) 2.5 MCG/ACT inhaler 2 puff  2 puff Inhalation Daily RT    albuterol sulfate HFA (PROVENTIL;VENTOLIN;PROAIR) 108 (90 Base) MCG/ACT inhaler 2 puff  2 puff Inhalation Q6H WA RT    guaiFENesin (MUCINEX) extended release tablet 1,200 mg  1,200 mg Oral BID    guaiFENesin-dextromethorphan (ROBITUSSIN DM) 100-10 MG/5ML syrup 5 mL  5 mL Oral Q4H PRN    prednisoLONE acetate (PRED FORTE) 1 % ophthalmic suspension 1 drop  1 drop Right Eye Daily    dexAMETHasone (DECADRON) tablet 6 mg  6 mg Oral Q24H    morphine (PF) injection 2 mg  2 mg IntraVENous Q4H PRN    albuterol (ACCUNEB) nebulizer solution 2.5 mg  2.5 mg Nebulization Q6H PRN    fluticasone (FLOVENT HFA) 110 MCG/ACT inhaler 1 puff  1 puff Inhalation BID RT    sodium chloride flush 0.9 % injection 5-40 mL  5-40 mL IntraVENous 2 times per day    sodium chloride flush 0.9 % injection 5-40 mL  5-40 mL IntraVENous PRN    0.9 % sodium chloride 
        [facility]  Hospitalist Group                                                                                          Hospitalist Progress Note  Deb Ceja MD  Office Phone: [phone]        Date of Service:  2025  NAME:  Kevan Lynne  :  12/10/1934  MRN:  [id number]       Admission Summary:   Kevan Lynne is a 89 y.o.  male with PMHx severe copd (follow with Dr. Avelar) on 2L baseline, HTN, chf (EF 55-60% ), macular degeneration (blind in right eye, 20% vision in left eye), BPH, hyperlipidemia brought in from home by EMS for respiratory distress.     Per ER, patient 81% RA, in 80s on home 2L for EMS.  Placed on bipap in ER.  On arrival RR 42, 226/106, 98% on FIO2 35%.       CXR with low lung volume, worsened b/l interstitial infiltrates with pleural effusions from 2022.     Hx from granddaughter Christina Haase bedside.  Patient been having cough x 2-3 days and tested positive for covid-19 at home yesterday.  Today, he has been weak, not eating, no appetite, difficulty getting dressed, confused, O2 sats in 80s.  No hx dysphagia, recent n/v.     He denies fever, chills, chest pain, n/v, diarrhea, abdominal pain.         Interval history / Subjective:   Patient examined at bedside.  Awake, alert, conversive, states he feels much better  Patient sitting in the chair.  Now down to 2 L of oxygen.  States that he does not use oxygen at home except for as needed  Case discussed with bedside RN.  Pulmonology on board -continue antibiotics, wean steroids    Attempted to call daughter Nirmala but no answer     Assessment & Plan:       Acute COPD exacerbation improving  COVID-19 infection  Suspected superadded bacterial infection  Acute on chronic hypoxic respiratory failure due to above improving  Chest x-ray 2025 slight worsening of bibasilar interstitial infiltrate and pleural effusions.  ABG 2025 pH 7.38 PCO2 48 PO2 77 on 35% FiO2.  COVID-19 positive.  
        [facility]                                                                                          Hospitalist Progress Note  Jayden Pedraza MD  Office Phone: [phone]        Date of Service:  2025  NAME:  Kevan Lynne  :  12/10/1934  MRN:  [id number]       Admission Summary:   Kevan Lynne is a 89 y.o.  male with PMHx severe copd (follow with Dr. Avelar) on 2L baseline, HTN, chf (EF 55-60% ), macular degeneration (blind in right eye, 20% vision in left eye), BPH, hyperlipidemia brought in from home by EMS for respiratory distress.     Per ER, patient 81% RA, in 80s on home 2L for EMS.  Placed on bipap in ER.  On arrival RR 42, 226/106, 98% on FIO2 35%.       CXR with low lung volume, worsened b/l interstitial infiltrates with pleural effusions from 2022.     Hx from granddaughter Christina Haase bedside.  Patient been having cough x 2-3 days and tested positive for covid-19 at home yesterday.  Today, he has been weak, not eating, no appetite, difficulty getting dressed, confused, O2 sats in 80s.  No hx dysphagia, recent n/v.     He denies fever, chills, chest pain, n/v, diarrhea, abdominal pain.         Interval history / Subjective:   Patient examined at bedside  Patient sitting in the chair.  Patient is still on 6 L of oxygen  Patient is pleasantly confused  Case discussed with bedside RN.  Pulmonology on board     Assessment & Plan:       Acute COPD exacerbation improving  COVID-19 infection  Suspected superadded bacterial infection  Acute on chronic hypoxic respiratory failure due to above improving  Chest x-ray 2025 slight worsening of bibasilar interstitial infiltrate and pleural effusions.  ABG 2025 pH 7.38 PCO2 48 PO2 77 on 35% FiO2.  COVID-19 positive.  Influenza negative.  Patient requiring more than supplemental oxygen from home baseline to maintain SpO2 above 92.  CRP 0.71      -LULY/LAMA: DuoNeb  -LABA/LAMA/ICS: 
    Name: Kevan [facility]: [facility]   : 12/10/1934 Admit Date: 2025   Phone: [phone]  Room: A461/   PCP: Cierra Isaac MD  MRN: [id number]   Date: 2025  Code: DNR          Chart and notes reviewed. Data reviewed. I review the patient's current medications in the medical record at each encounter.  I have evaluated and examined the patient.     History of Present Illness:  Mr. Lynne is a 89 yo gentleman with a history of COPD and chronic respiratory failure with hypoxia on 2L NC at baseline who is admitted with acute on chronic respiratory failure with hypoxia in the setting of a COPD exacerbation due to COVID-19. He was described to have a cough for 2-3 days prior to admission with progression to worsening hypoxia, shortness of breath, weakness, poor appetite/PO intake, and confusion. EMS found him to be in the 80's on home 2L and in respiratory distress. Placed on BiPAP in the ED, has remained on this overnight and is currently satting well/breathing comfortably on  35%. Denies shortness of breath currently.     Labs: Cr 1.02, Procalcitonin <0.05, CRP 0.96, WBC 5.1, Hgb 7.8, +COVID-19, ABG 7.38/48/77 on     Images reviewed:  CXR 2025: hyperinflation, interstitial infiltrates, small bilateral effusions    Interval history  Afebrile  BP stable  Sats 91% on 6L  950ml UOP  K 3.4  CRP 0.71 - better  Hgb 7.3 - decreased   blood cx with coag neg staph in 1/2 bottles - staph epidermidis detected by PCR  RVP neg    ROS: Reports increased SOB today.  O2 requirements have increased.  Denies CP.  Denies fever or chills.  Notes dry cough - not bringing anything up.  Cough noted when eating.    Past Medical History:   Diagnosis Date    BPH (benign prostatic hyperplasia)     Chronic hypoxemic respiratory failure (HCC)     COPD (chronic obstructive pulmonary disease) (HCC)     Dementia (HCC)     HTN (hypertension)     Hyperlipidemia     Macular degeneration of right 
    Name: Kevan [facility]: [facility]   : 12/10/1934 Admit Date: 2025   Phone: [phone]  Room: 24/   PCP: Cierra Isaac MD  MRN: [id number]   Date: 2025  Code: DNR          Chart and notes reviewed. Data reviewed. I review the patient's current medications in the medical record at each encounter.  I have evaluated and examined the patient.     History of Present Illness:  Mr. Lynne is a 89 yo gentleman with a history of COPD and chronic respiratory failure with hypoxia on 2L NC at baseline who is admitted with acute on chronic respiratory failure with hypoxia in the setting of a COPD exacerbation due to COVID-19. He was described to have a cough for 2-3 days prior to admission with progression to worsening hypoxia, shortness of breath, weakness, poor appetite/PO intake, and confusion. EMS found him to be in the 80's on home 2L and in respiratory distress. Placed on BiPAP in the ED, has remained on this overnight and is currently satting well/breathing comfortably on  35%. Denies shortness of breath currently.     Labs: Cr 1.02, Procalcitonin <0.05, CRP 0.96, WBC 5.1, Hgb 7.8, +COVID-19, ABG 7.38/48/77 on     Images reviewed:  CXR 2025: hyperinflation, interstitial infiltrates, small bilateral effusions    Interval history  Afebrile  BP stable  Sats 100% on BiPAP  1600ml UOP  K 3.4  CRP 0.71 - better  Hgb 7.9 - stable   blood cx with coag neg staph in 1/2 bottles - staph epidermidis detected by PCR  RVP neg    ROS: Feeling better this morning.  SOB improved.  Denies pain.  Denies fever or chills.    Past Medical History:   Diagnosis Date    BPH (benign prostatic hyperplasia)     Chronic hypoxemic respiratory failure (HCC)     COPD (chronic obstructive pulmonary disease) (HCC)     Dementia (HCC)     HTN (hypertension)     Hyperlipidemia     Macular degeneration of right eye     Obese        Past Surgical History:   Procedure Laterality Date    
    Name: Kevan [facility]: [facility]   : 12/10/1934 Admit Date: 2025   Phone: [phone]  Room: 24/   PCP: Cierra Isaac MD  MRN: [id number]   Date: 2025  Code: DNR          Chart and notes reviewed. Data reviewed. I review the patient's current medications in the medical record at each encounter.  I have evaluated and examined the patient.     History of Present Illness:  Mr. Lynne is a 89 yo gentleman with a history of COPD and chronic respiratory failure with hypoxia on 2L NC at baseline who is admitted with acute on chronic respiratory failure with hypoxia in the setting of a COPD exacerbation due to COVID-19. He was described to have a cough for 2-3 days prior to admission with progression to worsening hypoxia, shortness of breath, weakness, poor appetite/PO intake, and confusion. EMS found him to be in the 80's on home 2L and in respiratory distress. Placed on BiPAP in the ED, has remained on this overnight and is currently satting well/breathing comfortably on  35%. Denies shortness of breath currently.     Labs: Cr 1.02, Procalcitonin <0.05, CRP 0.96, WBC 5.1, Hgb 7.8, +COVID-19, ABG 7.38/48/77 on     Images reviewed:  CXR 2025: hyperinflation, interstitial infiltrates, small bilateral effusions    Interval history  Afebrile  BP elevated  Sats 95% on BiPAP; patient removed himself and desats noted to 85%  2135ml UOP  CRP 0.71 - better  Hgb 7.9 - stable   blood cx with GPC in 1/2 bottles - staph epidermidis detected by PCR  RVP neg    ROS: Endorses SOB and HA.  Appears fatigued.  No cough.    Past Medical History:   Diagnosis Date    BPH (benign prostatic hyperplasia)     Chronic hypoxemic respiratory failure (HCC)     COPD (chronic obstructive pulmonary disease) (HCC)     Dementia (HCC)     HTN (hypertension)     Hyperlipidemia     Macular degeneration of right eye     Obese        Past Surgical History:   Procedure Laterality Date    
    Name: Kevan [facility]: [facility]   : 12/10/1934 Admit Date: 2025   Phone: [phone]  Room: A461/01   PCP: Cierra Isaac MD  MRN: [id number]   Date: 2025  Code: DNR          Chart and notes reviewed. Data reviewed. I review the patient's current medications in the medical record at each encounter.  I have evaluated and examined the patient.     History of Present Illness:  Mr. Lynne is a 89 yo gentleman with a history of COPD and chronic respiratory failure with hypoxia on 2L NC at baseline who is admitted with acute on chronic respiratory failure with hypoxia in the setting of a COPD exacerbation due to COVID-19. He was described to have a cough for 2-3 days prior to admission with progression to worsening hypoxia, shortness of breath, weakness, poor appetite/PO intake, and confusion. EMS found him to be in the 80's on home 2L and in respiratory distress. Placed on BiPAP in the ED, has remained on this overnight and is currently satting well/breathing comfortably on  35%. Denies shortness of breath currently.     Labs: Cr 1.02, Procalcitonin <0.05, CRP 0.96, WBC 5.1, Hgb 7.8, +COVID-19, ABG 7.38/48/77 on     Images reviewed:  CXR 2025: hyperinflation, interstitial infiltrates, small bilateral effusions    Interval history  Afebrile  BP stable  Sats 91% on 6L  950ml UOP  K 3.4  CRP 0.71 - better  Hgb 7.3 - decreased   blood cx with coag neg staph in 1/2 bottles - staph epidermidis detected by PCR  RVP neg    ROS: Reports increased SOB today.  O2 requirements have increased.  Denies CP.  Denies fever or chills.  Notes dry cough - not bringing anything up.  Cough noted when eating.    : he says he's feeling better today. Appears dyspneic at rest. Nurse says he hasn't received morning inhalers yet.  Congested cough, he isn't able  to tell me the color of his sputum (he's colorblind). On 2L O2.  Flutter valve is at bedside but he says he wasn't shown 
    Name: Kevan [facility]: [facility]   : 12/10/1934 Admit Date: 2025   Phone: [phone]  Room: A461/   PCP: Cierra Isaac MD  MRN: [id number]   Date: 4/10/2025  Code: DNR          Chart and notes reviewed. Data reviewed. I review the patient's current medications in the medical record at each encounter.  I have evaluated and examined the patient.     History of Present Illness:  Mr. Lynne is a 89 yo gentleman with a history of COPD and chronic respiratory failure with hypoxia on 2L NC at baseline who is admitted with acute on chronic respiratory failure with hypoxia in the setting of a COPD exacerbation due to COVID-19. He was described to have a cough for 2-3 days prior to admission with progression to worsening hypoxia, shortness of breath, weakness, poor appetite/PO intake, and confusion. EMS found him to be in the 80's on home 2L and in respiratory distress. Placed on BiPAP in the ED, has remained on this overnight and is currently satting well/breathing comfortably on  35%. Denies shortness of breath currently.     Labs: Cr 1.02, Procalcitonin <0.05, CRP 0.96, WBC 5.1, Hgb 7.8, +COVID-19, ABG 7.38/48/77 on     Images reviewed:  CXR 2025: hyperinflation, interstitial infiltrates, small bilateral effusions    Interval history  Afebrile  BP stable  Sats 97% on 6L; we weaned to 3L with sats remaining 97%  1120ml UOP  K 3.4  CRP 0.71 - better  Hgb 7.9 - stable   blood cx with coag neg staph in 1/2 bottles - staph epidermidis detected by PCR  RVP neg    ROS: Denies CP or SOB.  Reports dry cough.  No other complaints.    Past Medical History:   Diagnosis Date    BPH (benign prostatic hyperplasia)     Chronic hypoxemic respiratory failure (HCC)     COPD (chronic obstructive pulmonary disease) (HCC)     Dementia (HCC)     HTN (hypertension)     Hyperlipidemia     Macular degeneration of right eye     Obese        Past Surgical History:   Procedure Laterality 
   04/14/25 0913   Spirometry Assessment   COPD Exacerbations in last year 0   COPD Assessment (CAT Score)   $RT COPD Assessment Yes   GOLD Staging   Group Group B     Pt adm 4/6 Covid+ with O2 sat=80's on 2L.  Placed on BIPAP in ER/ICU.  Pt wears 2L O2 @ home.  Pt never smoked.  Does not follow with Pulmonary at baseline but does have Pulm consulted with this admission.  Will schedule new pt PAR appt.  Pt not good candidate for Ansible.  
0700 Bedside and Verbal shift change report given to FUNMILAYO Flower (oncoming nurse) by FUNMILAYO Carbone (offgoing nurse). Report included the following information Nurse Handoff Report, Recent Results, Med Rec Status, and Cardiac Rhythm NSR .     1738 Pt continuously taking off bipap mask due to discomfort. Placed nasal canula back on at 7L for trial. Notified MD. Pt comfortable at this time.     
[facility]  [address] [place] [address] [phone]    [facility] Adult  Hospitalist Group                                                                                          Hospitalist Progress Note  Renetta Yi MD        Date of Service:  4/10/2025  NAME:  Kevan Lynne  :  12/10/1934  MRN:  [id number]      Interval history / Subjective:   Patient is on BiPAP at the time of my visit currently and appears comfortable     Assessment & Plan:     Acute on chronic hypoxic respiratory failure POA: multifactorial from COVID-19 virus infection with suspected pneumonia, COPD and CHF exacerbation. Usually on 2L/min of home oxygen but requiring BiPAP at night and is at 3 L during the day. He remains at risk for deterioration given advanced age. He is a DNR. Continue to treat the triggers. Continue oxygen and wean oxygen requirements as tolerated. Target oxygen sats >90%. Pulmonology following.      COPD with acute exacerbation due to possible pneumonia due to COVID-19 virus POA: CXR showed worsening bibasilar infiltrates. Likely viral. Blood cultures isolated CoN Staph in 1/2 bottles likely a contaminant. Continue Pulmicort, Flovent, DuoNeb, IV steroids switched to p.o., empiric IV Ceftriaxone and Doxycycline. Pulmonology following     Acute on chronic heart failure with preserved ejection fraction (HFpEF) POA: triggered by the respiratory illness and anemia. Last Echo 2022 showed an EF of 55-60%. Now with interstitial edema as noted on CXR. Given a few doses of IV Furosemide. May resume Carvedilol if diet tolerated. Otherwise continue IV Metoprolol. Follow renal function.      Iron deficiency anemia due to chronic blood loss POA: reported Hx of epistaxis. Iron and ferritin low. Hgb 7.9. Transfuse as needed to keep Hgb > 7. Has not tolerated oral Iron due to constipation. Continue to follow     Alzheimer's dementia without behavioral disturbance 
Patient was able to tolerate Farrell removal and Manwick placed to monitor output for voiding trial.   
Pharmacy Dosing Services: 4/10/2025    The pharmacist has determined that this patient meets P & T approved criteria for conversion from IV to oral therapy for the following medication: Doxy      The pharmacist has written the following order for the patient: Doxy 100 PO BID  The pharmacist will continue to monitor the patient's status and advise the physician if conversion back to IV therapy is recommended.    Thanks,   Jamarcus Toro, WilberD      
Physical Therapy Note:  Chart reviewed and spoke with nursing. Patient with increased work of breathing and required return to BiPAP for respiratory support. Nsg asked to defer. Will continue to follow for eval.  Thank you,  Iasac Bah, PT     
Pt not in any distress over the night and did not require BiPAP. RN made aware  
Pt placed back on bipap due to increased work of breathing and dyspnea at rest.    RT notified and at bedside adjusting bipap settings.  
Pulmonary follow up set for Friday June 13th 2025 at 1:45 PM with RAFI Zuniga at their [place] location in [place]. Office asks for patient to arrive 30 mins early,bring picture ID/Insurance cards and list of current medications.   
RN called to bedside by sitter. Pt reporting SOB. Pt tachnypneic with labored shallow breaths. O2 increased from 3L to 4L. Pt reports some relief and breathing looks less labored. Will continue to monitor.  
RRT note:    0942 Reviewing monitors in tele room.  Noted patient HR dropped to 30's and 40's.  Found patient resetting in the bed A&Ox3, he denies any complaints or distress.  VSS.    4/13/25  0334 patient resting quietly, no acute distress noted, VSS at this time.     RITA Greenfield RN  
Report given to Alfonzo at [facility].  
TRANSFER - IN REPORT:    Verbal report received from FUNMILAYO Toro on Kevan Lynne  being received from [facility] for routine progression of patient care      Report consisted of patient's Situation, Background, Assessment and   Recommendations(SBAR).     Information from the following report(s) Nurse Handoff Report, MAR, and Cardiac Rhythm NSR  was reviewed with the receiving nurse.    Opportunity for questions and clarification was provided.      Assessment completed upon patient's arrival to unit and care assumed.     
Trial pt off BIPAP.  Pt tolerating well on 4L NC with RR=25 and O2 sat=95% minimal accessory muscle use.  BIPAP on standby  
changes were discussed with: Registered nurse    Patient understands intent and goals of therapy, but is neutral about his/her participation    Thank you,  Regina Mathis, SLP    SLP Individual Minutes  Time In: 1310  Time Out: 1330  Minutes: 20    
   HGB 7.7 04/14/2025 02:56 AM     04/14/2025 02:56 AM    MCV 70.0 04/14/2025 02:56 AM       Lab Results   Component Value Date/Time    INR 1.3 06/03/2022 07:57 AM    GLOB 3.2 04/14/2025 02:56 AM       Lab Results   Component Value Date/Time    IRON 14 04/06/2025 06:15 PM    TIBC 394 04/06/2025 06:15 PM       Lab Results   Component Value Date/Time    CRP 0.71 04/08/2025 03:05 AM    TSH 1.86 06/03/2022 12:06 AM        Lab Results   Component Value Date/Time     06/03/2022 12:06 AM      IMPRESSION  Acute on chronic respiratory failure with hypoxia, 2L NC at home  COVID-19  COPD exacerbation  Abnormal CXR with small effusions (do not appear large enough to drain) and increased interstitial markings. Repeat CXR with increased airspace disease/effusion at the left base. Afebrile, no leukocytosis.  Elevated bnp  transaminitis    PLAN  Goal sats 90% or higher; currently on 2L  Continue BiPAP nightly and prn increased WOB   Continue decadron    Unfortunately unable to give nebs d/t covid-19 infection per hospital policy. Continue scheduled albuterol HFA, Flovent HFA  PRN albuterol   Incentive spirometer   Mucinex 1200 and continue flutter  Diet per SLP  Doxy/Ceftriaxone x5 days for a COPD exacerbation is reasonable        MATILDA Mckinnon - NP  
a full liquid diet.      COPD with acute exacerbation due to possible pneumonia due to COVID-19 virus POA: CXR showed worsening bibasilar infiltrates. Likely viral. Blood cultures isolated CoN Staph in 1/2 bottles likely a contaminant. neg. Continue Pulmicort, Flovent, DuoNeb, IV Dexamethasone, empiric IV Ceftriaxone and Doxycycline. Pulmonology following    Acute on chronic heart failure with preserved ejection fraction (HFpEF) POA: triggered by the respiratory illness and anemia. Last Echo 6/2022 showed an EF of 55-60%. Now with interstitial edema as noted on CXR. Given a few doses of IV Furosemide. May resume Carvedilol if diet tolerated. Otherwise continue IV Metoprolol. Follow renal function.     Iron deficiency anemia due to chronic blood loss POA: reported Hx of epistaxis. Iron and ferritin low. Hgb 7.9. Transfuse as needed to keep Hgb > 7. Has not tolerated oral Iron due to constipation. Continue to follow    Alzheimer's dementia without behavioral disturbance POA: has had episodes of delirium and could persist or worsen. Hold Aricept and Namenda until able to tolerate oral intake. Supportive care. Consider IV/IM Haldol PRN     HTN (hypertension), benign / Dyslipidemia POA: BP variable. Holding Carvedilol, Lipitor. Continue IV Metoprolol    BPH (benign prostatic hyperplasia) POA: hold Flomax    Time taken to provide and co-ordinate care:  30 minutes  Critical care     Care Plan discussed with: Nursing, CM, grand-daughter Ms Haase and pulmonology    Prophylaxis:  Lovenox                Expected Disposition:  SNF/LTC vs  PT, OT, RN vs TBD    PCP:      iCerra Isaac MD     I have personally examined and treated the patient at bedside during this period. To assist coordination of care and communication with nursing and staff, this note may be preliminary early in the day, but finalized by end of the day.         ___________________________________________________    Attending Physician:   Yogesh

## 2025-04-15 NOTE — PROGRESS NOTES
Sherrie,  from [employer] called to reschedule new patient appointment to a sooner time. Unfortunately no sooner time. Also advised that he has Dr. Isaac from [facility] as his PCP and he needed to either come to our office or go there. Also advised to call back anytime to see if any cancellations.

## 2025-04-21 ENCOUNTER — HOSPITAL ENCOUNTER (OUTPATIENT)
Facility: HOSPITAL | Age: 89
Setting detail: SPECIMEN
Discharge: HOME | End: 2025-04-24

## 2025-04-22 ENCOUNTER — HOSPITAL ENCOUNTER (OUTPATIENT)
Facility: HOSPITAL | Age: 89
Setting detail: SPECIMEN
Discharge: HOME | End: 2025-04-25

## 2025-04-22 PROCEDURE — 86900 BLOOD TYPING SEROLOGIC ABO: CPT

## 2025-04-22 PROCEDURE — P9016 RBC LEUKOCYTES REDUCED: HCPCS

## 2025-04-22 PROCEDURE — 86901 BLOOD TYPING SEROLOGIC RH(D): CPT

## 2025-04-22 PROCEDURE — 86850 RBC ANTIBODY SCREEN: CPT

## 2025-04-22 PROCEDURE — 86920 COMPATIBILITY TEST SPIN: CPT

## 2025-04-23 LAB
ABO + RH BLD: NORMAL
BLD PROD TYP BPU: NORMAL
BLOOD BANK BLOOD PRODUCT EXPIRATION DATE: NORMAL
BLOOD BANK DISPENSE STATUS: NORMAL
BLOOD BANK ISBT PRODUCT BLOOD TYPE: 6200
BLOOD BANK PRODUCT CODE: NORMAL
BLOOD BANK UNIT TYPE AND RH: NORMAL
BLOOD GROUP ANTIBODIES SERPL: NORMAL
BPU ID: NORMAL
CROSSMATCH RESULT: NORMAL
SPECIMEN EXP DATE BLD: NORMAL
UNIT DIVISION: 0
UNIT ISSUE DATE/TIME: NORMAL

## 2025-06-23 ENCOUNTER — HOSPITAL ENCOUNTER (INPATIENT)
Facility: HOSPITAL | Age: 89
LOS: 6 days | Discharge: HOSPICE | DRG: 193 | End: 2025-06-29
Attending: EMERGENCY MEDICINE | Admitting: FAMILY MEDICINE
Payer: MEDICARE

## 2025-06-23 ENCOUNTER — APPOINTMENT (OUTPATIENT)
Facility: HOSPITAL | Age: 89
DRG: 193 | End: 2025-06-23
Payer: MEDICARE

## 2025-06-23 DIAGNOSIS — R09.02 HYPOXIA: ICD-10-CM

## 2025-06-23 DIAGNOSIS — J44.1 COPD EXACERBATION (HCC): Primary | ICD-10-CM

## 2025-06-23 PROBLEM — J96.01 ACUTE HYPOXEMIC RESPIRATORY FAILURE (HCC): Status: ACTIVE | Noted: 2025-06-23

## 2025-06-23 LAB
ALBUMIN SERPL-MCNC: 3.6 G/DL (ref 3.5–5)
ALBUMIN/GLOB SERPL: 0.9 (ref 1.1–2.2)
ALP SERPL-CCNC: 39 U/L (ref 45–117)
ALT SERPL-CCNC: 24 U/L (ref 12–78)
ANION GAP SERPL CALC-SCNC: 10 MMOL/L (ref 2–12)
AST SERPL-CCNC: 39 U/L (ref 15–37)
BASE EXCESS BLDV CALC-SCNC: 6.1 MMOL/L
BASOPHILS # BLD: 0.05 K/UL (ref 0–0.1)
BASOPHILS NFR BLD: 0.6 % (ref 0–1)
BILIRUB SERPL-MCNC: 0.6 MG/DL (ref 0.2–1)
BUN SERPL-MCNC: 13 MG/DL (ref 6–20)
BUN/CREAT SERPL: 12 (ref 12–20)
CALCIUM SERPL-MCNC: 9 MG/DL (ref 8.5–10.1)
CHLORIDE SERPL-SCNC: 100 MMOL/L (ref 97–108)
CO2 SERPL-SCNC: 26 MMOL/L (ref 21–32)
COMMENT:: NORMAL
CREAT SERPL-MCNC: 1.09 MG/DL (ref 0.7–1.3)
DIFFERENTIAL METHOD BLD: ABNORMAL
EOSINOPHIL # BLD: 0.15 K/UL (ref 0–0.4)
EOSINOPHIL NFR BLD: 1.8 % (ref 0–7)
ERYTHROCYTE [DISTWIDTH] IN BLOOD BY AUTOMATED COUNT: 20.1 % (ref 11.5–14.5)
GLOBULIN SER CALC-MCNC: 4.2 G/DL (ref 2–4)
GLUCOSE SERPL-MCNC: 112 MG/DL (ref 65–100)
HCO3 BLDV-SCNC: 31.8 MMOL/L (ref 23–28)
HCT VFR BLD AUTO: 26.7 % (ref 36.6–50.3)
HGB BLD-MCNC: 7.8 G/DL (ref 12.1–17)
IMM GRANULOCYTES # BLD AUTO: 0.03 K/UL (ref 0–0.04)
IMM GRANULOCYTES NFR BLD AUTO: 0.4 % (ref 0–0.5)
LYMPHOCYTES # BLD: 1.3 K/UL (ref 0.8–3.5)
LYMPHOCYTES NFR BLD: 15.7 % (ref 12–49)
MAGNESIUM SERPL-MCNC: 2.4 MG/DL (ref 1.6–2.4)
MCH RBC QN AUTO: 20.6 PG (ref 26–34)
MCHC RBC AUTO-ENTMCNC: 29.2 G/DL (ref 30–36.5)
MCV RBC AUTO: 70.4 FL (ref 80–99)
MONOCYTES # BLD: 2.91 K/UL (ref 0–1)
MONOCYTES NFR BLD: 35.1 % (ref 5–13)
NEUTS SEG # BLD: 3.86 K/UL (ref 1.8–8)
NEUTS SEG NFR BLD: 46.4 % (ref 32–75)
NRBC # BLD: 0 K/UL (ref 0–0.01)
NRBC BLD-RTO: 0 PER 100 WBC
NT PRO BNP: 1016 PG/ML
PCO2 BLDV: 51.4 MMHG (ref 41–51)
PH BLDV: 7.4 (ref 7.32–7.42)
PLATELET # BLD AUTO: 318 K/UL (ref 150–400)
PLATELET COMMENT: ABNORMAL
PO2 BLDV: 38 MMHG (ref 25–40)
POTASSIUM SERPL-SCNC: 5 MMOL/L (ref 3.5–5.1)
PROT SERPL-MCNC: 7.8 G/DL (ref 6.4–8.2)
RBC # BLD AUTO: 3.79 M/UL (ref 4.1–5.7)
RBC MORPH BLD: ABNORMAL
SAO2 % BLDV: 70.2 % (ref 65–88)
SERVICE CMNT-IMP: ABNORMAL
SERVICE CMNT-IMP: ABNORMAL
SODIUM SERPL-SCNC: 136 MMOL/L (ref 136–145)
SPECIMEN HOLD: NORMAL
SPECIMEN TYPE: ABNORMAL
TROPONIN I SERPL HS-MCNC: 11 NG/L (ref 0–76)
TROPONIN I SERPL HS-MCNC: 9 NG/L (ref 0–76)
WBC # BLD AUTO: 8.3 K/UL (ref 4.1–11.1)

## 2025-06-23 PROCEDURE — 83735 ASSAY OF MAGNESIUM: CPT

## 2025-06-23 PROCEDURE — 81001 URINALYSIS AUTO W/SCOPE: CPT

## 2025-06-23 PROCEDURE — 84484 ASSAY OF TROPONIN QUANT: CPT

## 2025-06-23 PROCEDURE — 5A0945A ASSISTANCE WITH RESPIRATORY VENTILATION, 24-96 CONSECUTIVE HOURS, HIGH NASAL FLOW/VELOCITY: ICD-10-PCS | Performed by: HOSPITALIST

## 2025-06-23 PROCEDURE — 85025 COMPLETE CBC W/AUTO DIFF WBC: CPT

## 2025-06-23 PROCEDURE — 6370000000 HC RX 637 (ALT 250 FOR IP): Performed by: EMERGENCY MEDICINE

## 2025-06-23 PROCEDURE — 2700000000 HC OXYGEN THERAPY PER DAY

## 2025-06-23 PROCEDURE — 36415 COLL VENOUS BLD VENIPUNCTURE: CPT

## 2025-06-23 PROCEDURE — 6360000002 HC RX W HCPCS: Performed by: EMERGENCY MEDICINE

## 2025-06-23 PROCEDURE — 80053 COMPREHEN METABOLIC PANEL: CPT

## 2025-06-23 PROCEDURE — 94761 N-INVAS EAR/PLS OXIMETRY MLT: CPT

## 2025-06-23 PROCEDURE — 96374 THER/PROPH/DIAG INJ IV PUSH: CPT

## 2025-06-23 PROCEDURE — 99285 EMERGENCY DEPT VISIT HI MDM: CPT

## 2025-06-23 PROCEDURE — 82803 BLOOD GASES ANY COMBINATION: CPT

## 2025-06-23 PROCEDURE — 1100000000 HC RM PRIVATE

## 2025-06-23 PROCEDURE — 83880 ASSAY OF NATRIURETIC PEPTIDE: CPT

## 2025-06-23 PROCEDURE — 5A09457 ASSISTANCE WITH RESPIRATORY VENTILATION, 24-96 CONSECUTIVE HOURS, CONTINUOUS POSITIVE AIRWAY PRESSURE: ICD-10-PCS | Performed by: HOSPITALIST

## 2025-06-23 PROCEDURE — 71045 X-RAY EXAM CHEST 1 VIEW: CPT

## 2025-06-23 PROCEDURE — 93005 ELECTROCARDIOGRAM TRACING: CPT | Performed by: EMERGENCY MEDICINE

## 2025-06-23 PROCEDURE — 0202U NFCT DS 22 TRGT SARS-COV-2: CPT

## 2025-06-23 RX ORDER — DONEPEZIL HYDROCHLORIDE 5 MG/1
10 TABLET, FILM COATED ORAL NIGHTLY
Status: DISCONTINUED | OUTPATIENT
Start: 2025-06-23 | End: 2025-06-28

## 2025-06-23 RX ORDER — SODIUM CHLORIDE 0.9 % (FLUSH) 0.9 %
5-40 SYRINGE (ML) INJECTION PRN
Status: DISCONTINUED | OUTPATIENT
Start: 2025-06-23 | End: 2025-06-28

## 2025-06-23 RX ORDER — TAMSULOSIN HYDROCHLORIDE 0.4 MG/1
0.4 CAPSULE ORAL DAILY
Status: DISCONTINUED | OUTPATIENT
Start: 2025-06-24 | End: 2025-06-28

## 2025-06-23 RX ORDER — IPRATROPIUM BROMIDE AND ALBUTEROL SULFATE 2.5; .5 MG/3ML; MG/3ML
1 SOLUTION RESPIRATORY (INHALATION)
Status: COMPLETED | OUTPATIENT
Start: 2025-06-23 | End: 2025-06-23

## 2025-06-23 RX ORDER — POTASSIUM CHLORIDE 7.45 MG/ML
10 INJECTION INTRAVENOUS PRN
Status: DISCONTINUED | OUTPATIENT
Start: 2025-06-23 | End: 2025-06-28

## 2025-06-23 RX ORDER — MONTELUKAST SODIUM 10 MG/1
10 TABLET ORAL DAILY
Status: DISCONTINUED | OUTPATIENT
Start: 2025-06-24 | End: 2025-06-28

## 2025-06-23 RX ORDER — IPRATROPIUM BROMIDE AND ALBUTEROL SULFATE 2.5; .5 MG/3ML; MG/3ML
1 SOLUTION RESPIRATORY (INHALATION)
Status: DISCONTINUED | OUTPATIENT
Start: 2025-06-23 | End: 2025-06-26

## 2025-06-23 RX ORDER — SIMVASTATIN 10 MG
10 TABLET ORAL NIGHTLY
Status: ON HOLD | COMMUNITY
End: 2025-06-29 | Stop reason: HOSPADM

## 2025-06-23 RX ORDER — ACETAMINOPHEN 325 MG/1
650 TABLET ORAL EVERY 6 HOURS PRN
Status: DISCONTINUED | OUTPATIENT
Start: 2025-06-23 | End: 2025-06-29 | Stop reason: HOSPADM

## 2025-06-23 RX ORDER — MAGNESIUM HYDROXIDE/ALUMINUM HYDROXICE/SIMETHICONE 120; 1200; 1200 MG/30ML; MG/30ML; MG/30ML
30 SUSPENSION ORAL EVERY 6 HOURS PRN
Status: DISCONTINUED | OUTPATIENT
Start: 2025-06-23 | End: 2025-06-29 | Stop reason: HOSPADM

## 2025-06-23 RX ORDER — SODIUM CHLORIDE 0.9 % (FLUSH) 0.9 %
5-40 SYRINGE (ML) INJECTION EVERY 12 HOURS SCHEDULED
Status: DISCONTINUED | OUTPATIENT
Start: 2025-06-23 | End: 2025-06-28

## 2025-06-23 RX ORDER — POLYETHYLENE GLYCOL 3350 17 G/17G
17 POWDER, FOR SOLUTION ORAL DAILY PRN
Status: DISCONTINUED | OUTPATIENT
Start: 2025-06-23 | End: 2025-06-28

## 2025-06-23 RX ORDER — ONDANSETRON 2 MG/ML
4 INJECTION INTRAMUSCULAR; INTRAVENOUS EVERY 6 HOURS PRN
Status: DISCONTINUED | OUTPATIENT
Start: 2025-06-23 | End: 2025-06-28

## 2025-06-23 RX ORDER — SODIUM CHLORIDE 9 MG/ML
INJECTION, SOLUTION INTRAVENOUS PRN
Status: DISCONTINUED | OUTPATIENT
Start: 2025-06-23 | End: 2025-06-29 | Stop reason: HOSPADM

## 2025-06-23 RX ORDER — GUAIFENESIN 600 MG/1
1200 TABLET, EXTENDED RELEASE ORAL 2 TIMES DAILY
Status: DISCONTINUED | OUTPATIENT
Start: 2025-06-23 | End: 2025-06-28

## 2025-06-23 RX ORDER — CARVEDILOL 6.25 MG/1
6.25 TABLET ORAL 2 TIMES DAILY WITH MEALS
Status: ON HOLD | COMMUNITY
End: 2025-06-29 | Stop reason: HOSPADM

## 2025-06-23 RX ORDER — ATORVASTATIN CALCIUM 10 MG/1
10 TABLET, FILM COATED ORAL DAILY
Status: DISCONTINUED | OUTPATIENT
Start: 2025-06-24 | End: 2025-06-28

## 2025-06-23 RX ORDER — MEMANTINE HYDROCHLORIDE 10 MG/1
10 TABLET ORAL 2 TIMES DAILY
Status: DISCONTINUED | OUTPATIENT
Start: 2025-06-23 | End: 2025-06-28

## 2025-06-23 RX ORDER — CARVEDILOL 6.25 MG/1
6.25 TABLET ORAL 2 TIMES DAILY WITH MEALS
Status: DISCONTINUED | OUTPATIENT
Start: 2025-06-24 | End: 2025-06-28

## 2025-06-23 RX ORDER — HYDRALAZINE HYDROCHLORIDE 20 MG/ML
10 INJECTION INTRAMUSCULAR; INTRAVENOUS EVERY 6 HOURS PRN
Status: DISCONTINUED | OUTPATIENT
Start: 2025-06-23 | End: 2025-06-24

## 2025-06-23 RX ORDER — ENOXAPARIN SODIUM 100 MG/ML
40 INJECTION SUBCUTANEOUS DAILY
Status: DISCONTINUED | OUTPATIENT
Start: 2025-06-24 | End: 2025-06-28

## 2025-06-23 RX ORDER — ALBUTEROL SULFATE 0.83 MG/ML
2.5 SOLUTION RESPIRATORY (INHALATION) EVERY 6 HOURS PRN
Status: DISCONTINUED | OUTPATIENT
Start: 2025-06-23 | End: 2025-06-29 | Stop reason: HOSPADM

## 2025-06-23 RX ORDER — IPRATROPIUM BROMIDE AND ALBUTEROL SULFATE 2.5; .5 MG/3ML; MG/3ML
1 SOLUTION RESPIRATORY (INHALATION) ONCE
Status: COMPLETED | OUTPATIENT
Start: 2025-06-23 | End: 2025-06-23

## 2025-06-23 RX ORDER — POTASSIUM CHLORIDE 750 MG/1
40 TABLET, EXTENDED RELEASE ORAL PRN
Status: DISCONTINUED | OUTPATIENT
Start: 2025-06-23 | End: 2025-06-28

## 2025-06-23 RX ORDER — MAGNESIUM SULFATE IN WATER 40 MG/ML
2000 INJECTION, SOLUTION INTRAVENOUS PRN
Status: DISCONTINUED | OUTPATIENT
Start: 2025-06-23 | End: 2025-06-28

## 2025-06-23 RX ORDER — ONDANSETRON 4 MG/1
4 TABLET, ORALLY DISINTEGRATING ORAL EVERY 8 HOURS PRN
Status: DISCONTINUED | OUTPATIENT
Start: 2025-06-23 | End: 2025-06-29 | Stop reason: HOSPADM

## 2025-06-23 RX ORDER — DEXAMETHASONE SODIUM PHOSPHATE 10 MG/ML
10 INJECTION, SOLUTION INTRAMUSCULAR; INTRAVENOUS ONCE
Status: COMPLETED | OUTPATIENT
Start: 2025-06-23 | End: 2025-06-23

## 2025-06-23 RX ORDER — ACETAMINOPHEN 650 MG/1
650 SUPPOSITORY RECTAL EVERY 6 HOURS PRN
Status: DISCONTINUED | OUTPATIENT
Start: 2025-06-23 | End: 2025-06-29 | Stop reason: HOSPADM

## 2025-06-23 RX ADMIN — IPRATROPIUM BROMIDE AND ALBUTEROL SULFATE 1 DOSE: .5; 3 SOLUTION RESPIRATORY (INHALATION) at 18:56

## 2025-06-23 RX ADMIN — IPRATROPIUM BROMIDE AND ALBUTEROL SULFATE 1 DOSE: .5; 3 SOLUTION RESPIRATORY (INHALATION) at 19:54

## 2025-06-23 RX ADMIN — DEXAMETHASONE SODIUM PHOSPHATE 10 MG: 10 INJECTION, SOLUTION INTRAMUSCULAR; INTRAVENOUS at 19:04

## 2025-06-23 ASSESSMENT — PAIN - FUNCTIONAL ASSESSMENT: PAIN_FUNCTIONAL_ASSESSMENT: NONE - DENIES PAIN

## 2025-06-23 NOTE — ED TRIAGE NOTES
Arrives via EMS with complaints of SOB worsening over the past 3 days. Diagn[facility] yesterday.     Has been on an antibiotic.

## 2025-06-23 NOTE — ED PROVIDER NOTES
[facility] EMERGENCY DEPARTMENT  EMERGENCY DEPARTMENT ENCOUNTER      Pt Name: Kevan Lynne  MRN: [id number]  Birthdate 12/10/1934  Date of evaluation: 6/23/2025  Provider: Katalina Amato MD    CHIEF COMPLAINT       Chief Complaint   Patient presents with    Shortness of Breath         HISTORY OF PRESENT ILLNESS   (Location/Symptom, Timing/Onset, Context/Setting, Quality, Duration, Modifying Factors, Severity)  Note limiting factors.   Patient is a 89-year-old with a history of COPD who comes into the emergency department with worsening shortness of breath.  Patient reports that [facility] and chart review demonstrates that he was started on Augmentin.  He comes into the emergency department today because despite taking his medications as prescribed he has developed worsening shortness of breath.  He is not currently on any steroids.    The history is provided by the patient.         Review of External Medical Records:     Nursing Notes were reviewed.    REVIEW OF SYSTEMS    (2-9 systems for level 4, 10 or more for level 5)     Review of Systems    Except as noted above the remainder of the review of systems was reviewed and negative.       PAST MEDICAL HISTORY     Past Medical History:   Diagnosis Date    BPH (benign prostatic hyperplasia)     Chronic hypoxemic respiratory failure (HCC)     COPD (chronic obstructive pulmonary disease) (HCC)     Dementia (HCC)     HTN (hypertension)     Hyperlipidemia     Macular degeneration of right eye     Obese          SURGICAL HISTORY       Past Surgical History:   Procedure Laterality Date    PROSTATECTOMY           CURRENT MEDICATIONS       Previous Medications    AMLODIPINE (NORVASC) 5 MG TABLET    Take 1 tablet by mouth daily    CARVEDILOL (COREG) 12.5 MG TABLET    Take 0.5 tablets by mouth 2 times daily    DONEPEZIL (ARICEPT) 10 MG TABLET    TAKE 1 TABLET BY MOUTH EVERY NIGHT    FLUTICASONE-SALMETEROL  the time of this note:    XR CHEST PORTABLE   Final Result      No acute process on portable chest. Severe emphysematous change, significant   bibasilar interstitial lung disease stable.         Electronically signed by MARIAA CARABALLO           LABS:  Labs Reviewed   CBC WITH AUTO DIFFERENTIAL - Abnormal; Notable for the following components:       Result Value    RBC 3.79 (*)     Hemoglobin 7.8 (*)     Hematocrit 26.7 (*)     MCV 70.4 (*)     MCH 20.6 (*)     MCHC 29.2 (*)     RDW 20.1 (*)     Monocytes % 35.1 (*)     Monocytes Absolute 2.91 (*)     All other components within normal limits   COMPREHENSIVE METABOLIC PANEL - Abnormal; Notable for the following components:    Glucose 112 (*)     AST 39 (*)     Alk Phosphatase 39 (*)     Globulin 4.2 (*)     Albumin/Globulin Ratio 0.9 (*)     All other components within normal limits   BRAIN NATRIURETIC PEPTIDE - Abnormal; Notable for the following components:    NT Pro-BNP 1,016 (*)     All other components within normal limits   VENOUS BLOOD GAS, POINT OF CARE - Abnormal; Notable for the following components:    PCO2, [place], POC 51.4 (*)     HCO3, Venous 31.8 (*)     All other components within normal limits   MAGNESIUM   TROPONIN   EXTRA TUBES HOLD   TROPONIN   BLOOD GAS, VENOUS       All other labs were within normal range or not returned as of this dictation.    EMERGENCY DEPARTMENT COURSE and DIFFERENTIAL DIAGNOSIS/MDM:   Vitals:    Vitals:    06/23/25 1849 06/23/25 1850 06/23/25 1945   BP: (!) 232/71  (!) 181/62   Pulse: 87  67   Resp: 26  22   Temp: 98.3 °F (36.8 °C)  98.2 °F (36.8 °C)   TempSrc: Oral  Oral   SpO2: (!) 86% 95% 96%   Weight: 73.9 kg (163 lb)     Height: 1.676 m (5' 6\")             Medical Decision Making  Amount and/or Complexity of Data Reviewed  Labs: ordered. Decision-making details documented in ED Course.  Radiology: ordered.  ECG/medicine tests: ordered.    Risk  Prescription drug management.  Decision regarding

## 2025-06-24 ENCOUNTER — APPOINTMENT (OUTPATIENT)
Facility: HOSPITAL | Age: 89
DRG: 193 | End: 2025-06-24
Payer: MEDICARE

## 2025-06-24 LAB
ANION GAP SERPL CALC-SCNC: 10 MMOL/L (ref 2–12)
APPEARANCE UR: CLEAR
ARTERIAL PATENCY WRIST A: YES
ARTERIAL PATENCY WRIST A: YES
B PERT DNA SPEC QL NAA+PROBE: NOT DETECTED
BACTERIA URNS QL MICRO: NEGATIVE /HPF
BASE DEFICIT BLDA-SCNC: 1.9 MMOL/L
BASE EXCESS BLDA CALC-SCNC: 0.3 MMOL/L
BASOPHILS # BLD: 0 K/UL (ref 0–0.1)
BASOPHILS NFR BLD: 0 % (ref 0–1)
BDY SITE: ABNORMAL
BDY SITE: ABNORMAL
BILIRUB UR QL: NEGATIVE
BORDETELLA PARAPERTUSSIS BY PCR: NOT DETECTED
BUN SERPL-MCNC: 14 MG/DL (ref 6–20)
BUN/CREAT SERPL: 15 (ref 12–20)
C PNEUM DNA SPEC QL NAA+PROBE: NOT DETECTED
CALCIUM SERPL-MCNC: 9 MG/DL (ref 8.5–10.1)
CHLORIDE SERPL-SCNC: 100 MMOL/L (ref 97–108)
CO2 SERPL-SCNC: 23 MMOL/L (ref 21–32)
COLOR UR: ABNORMAL
CREAT SERPL-MCNC: 0.96 MG/DL (ref 0.7–1.3)
DIFFERENTIAL METHOD BLD: ABNORMAL
EKG ATRIAL RATE: 70 BPM
EKG DIAGNOSIS: NORMAL
EKG Q-T INTERVAL: 360 MS
EKG QRS DURATION: 90 MS
EKG QTC CALCULATION (BAZETT): 391 MS
EKG R AXIS: 37 DEGREES
EKG T AXIS: 69 DEGREES
EKG VENTRICULAR RATE: 71 BPM
EOSINOPHIL # BLD: 0 K/UL (ref 0–0.4)
EOSINOPHIL NFR BLD: 0 % (ref 0–7)
EPITH CASTS URNS QL MICRO: ABNORMAL /LPF
ERYTHROCYTE [DISTWIDTH] IN BLOOD BY AUTOMATED COUNT: 20.1 % (ref 11.5–14.5)
FERRITIN SERPL-MCNC: 12 NG/ML (ref 26–388)
FIO2 ON VENT: 30 %
FIO2 ON VENT: 30 %
FLUAV SUBTYP SPEC NAA+PROBE: NOT DETECTED
FLUBV RNA SPEC QL NAA+PROBE: NOT DETECTED
GAS FLOW.O2 SETTING OXYMISER: 12
GAS FLOW.O2 SETTING OXYMISER: 12
GLUCOSE BLD STRIP.AUTO-MCNC: 166 MG/DL (ref 65–117)
GLUCOSE BLD STRIP.AUTO-MCNC: 168 MG/DL (ref 65–117)
GLUCOSE BLD STRIP.AUTO-MCNC: 193 MG/DL (ref 65–117)
GLUCOSE SERPL-MCNC: 237 MG/DL (ref 65–100)
GLUCOSE UR STRIP.AUTO-MCNC: NEGATIVE MG/DL
HADV DNA SPEC QL NAA+PROBE: NOT DETECTED
HCO3 BLDA-SCNC: 24 MMOL/L (ref 22–26)
HCO3 BLDA-SCNC: 25 MMOL/L (ref 22–26)
HCOV 229E RNA SPEC QL NAA+PROBE: NOT DETECTED
HCOV HKU1 RNA SPEC QL NAA+PROBE: NOT DETECTED
HCOV NL63 RNA SPEC QL NAA+PROBE: NOT DETECTED
HCOV OC43 RNA SPEC QL NAA+PROBE: NOT DETECTED
HCT VFR BLD AUTO: 27.5 % (ref 36.6–50.3)
HGB BLD-MCNC: 7.7 G/DL (ref 12.1–17)
HGB UR QL STRIP: NEGATIVE
HMPV RNA SPEC QL NAA+PROBE: DETECTED
HPIV1 RNA SPEC QL NAA+PROBE: NOT DETECTED
HPIV2 RNA SPEC QL NAA+PROBE: NOT DETECTED
HPIV3 RNA SPEC QL NAA+PROBE: NOT DETECTED
HPIV4 RNA SPEC QL NAA+PROBE: NOT DETECTED
HYALINE CASTS URNS QL MICRO: ABNORMAL /LPF (ref 0–2)
IMM GRANULOCYTES # BLD AUTO: 0 K/UL (ref 0–0.04)
IMM GRANULOCYTES NFR BLD AUTO: 0 % (ref 0–0.5)
IPAP/PIP: 16
IPAP/PIP: 16
IRON SATN MFR SERPL: 4 % (ref 20–50)
IRON SERPL-MCNC: 19 UG/DL (ref 35–150)
KETONES UR QL STRIP.AUTO: ABNORMAL MG/DL
LEUKOCYTE ESTERASE UR QL STRIP.AUTO: NEGATIVE
LYMPHOCYTES # BLD: 0.34 K/UL (ref 0.8–3.5)
LYMPHOCYTES NFR BLD: 5 % (ref 12–49)
M PNEUMO DNA SPEC QL NAA+PROBE: NOT DETECTED
MAGNESIUM SERPL-MCNC: 2 MG/DL (ref 1.6–2.4)
MCH RBC QN AUTO: 19.7 PG (ref 26–34)
MCHC RBC AUTO-ENTMCNC: 28 G/DL (ref 30–36.5)
MCV RBC AUTO: 70.5 FL (ref 80–99)
METAMYELOCYTES NFR BLD MANUAL: 3 %
MONOCYTES # BLD: 0.07 K/UL (ref 0–1)
MONOCYTES NFR BLD: 1 % (ref 5–13)
NEUTS BAND NFR BLD MANUAL: 8 %
NEUTS SEG # BLD: 6.19 K/UL (ref 1.8–8)
NEUTS SEG NFR BLD: 83 % (ref 32–75)
NITRITE UR QL STRIP.AUTO: NEGATIVE
NRBC # BLD: 0 K/UL (ref 0–0.01)
NRBC BLD-RTO: 0 PER 100 WBC
PCO2 BLDA: 40 MMHG (ref 35–45)
PCO2 BLDA: 46 MMHG (ref 35–45)
PEEP RESPIRATORY: 6
PEEP RESPIRATORY: 6
PH BLDA: 7.34 (ref 7.35–7.45)
PH BLDA: 7.41 (ref 7.35–7.45)
PH UR STRIP: 6 (ref 5–8)
PHOSPHATE SERPL-MCNC: 3 MG/DL (ref 2.6–4.7)
PLATELET # BLD AUTO: 290 K/UL (ref 150–400)
PMV BLD AUTO: 10.6 FL (ref 8.9–12.9)
PO2 BLDA: 66 MMHG (ref 80–100)
PO2 BLDA: 73 MMHG (ref 80–100)
POTASSIUM SERPL-SCNC: 4.6 MMOL/L (ref 3.5–5.1)
PROCALCITONIN SERPL-MCNC: <0.05 NG/ML
PROCALCITONIN SERPL-MCNC: <0.05 NG/ML
PROT UR STRIP-MCNC: 30 MG/DL
RBC # BLD AUTO: 3.9 M/UL (ref 4.1–5.7)
RBC #/AREA URNS HPF: ABNORMAL /HPF (ref 0–5)
RBC MORPH BLD: ABNORMAL
RSV RNA SPEC QL NAA+PROBE: NOT DETECTED
RV+EV RNA SPEC QL NAA+PROBE: NOT DETECTED
SAO2 % BLD: 93 % (ref 92–97)
SAO2 % BLD: 94 % (ref 92–97)
SAO2% DEVICE SAO2% SENSOR NAME: ABNORMAL
SAO2% DEVICE SAO2% SENSOR NAME: ABNORMAL
SARS-COV-2 RNA RESP QL NAA+PROBE: NOT DETECTED
SERVICE CMNT-IMP: ABNORMAL
SODIUM SERPL-SCNC: 133 MMOL/L (ref 136–145)
SP GR UR REFRACTOMETRY: 1.01 (ref 1–1.03)
SPECIMEN SITE: ABNORMAL
SPECIMEN SITE: ABNORMAL
TIBC SERPL-MCNC: 458 UG/DL (ref 250–450)
URINE CULTURE IF INDICATED: ABNORMAL
UROBILINOGEN UR QL STRIP.AUTO: 0.2 EU/DL (ref 0.2–1)
WBC # BLD AUTO: 6.8 K/UL (ref 4.1–11.1)
WBC URNS QL MICRO: ABNORMAL /HPF (ref 0–4)

## 2025-06-24 PROCEDURE — 2580000003 HC RX 258: Performed by: NURSE PRACTITIONER

## 2025-06-24 PROCEDURE — 85025 COMPLETE CBC W/AUTO DIFF WBC: CPT

## 2025-06-24 PROCEDURE — 6370000000 HC RX 637 (ALT 250 FOR IP): Performed by: FAMILY MEDICINE

## 2025-06-24 PROCEDURE — 2500000003 HC RX 250 WO HCPCS: Performed by: FAMILY MEDICINE

## 2025-06-24 PROCEDURE — 83735 ASSAY OF MAGNESIUM: CPT

## 2025-06-24 PROCEDURE — 2500000003 HC RX 250 WO HCPCS: Performed by: NURSE PRACTITIONER

## 2025-06-24 PROCEDURE — 36600 WITHDRAWAL OF ARTERIAL BLOOD: CPT

## 2025-06-24 PROCEDURE — 94640 AIRWAY INHALATION TREATMENT: CPT

## 2025-06-24 PROCEDURE — 2000000000 HC ICU R&B

## 2025-06-24 PROCEDURE — 83540 ASSAY OF IRON: CPT

## 2025-06-24 PROCEDURE — 87449 NOS EACH ORGANISM AG IA: CPT

## 2025-06-24 PROCEDURE — 2700000000 HC OXYGEN THERAPY PER DAY

## 2025-06-24 PROCEDURE — 36415 COLL VENOUS BLD VENIPUNCTURE: CPT

## 2025-06-24 PROCEDURE — 71045 X-RAY EXAM CHEST 1 VIEW: CPT

## 2025-06-24 PROCEDURE — 94761 N-INVAS EAR/PLS OXIMETRY MLT: CPT

## 2025-06-24 PROCEDURE — 6360000002 HC RX W HCPCS

## 2025-06-24 PROCEDURE — 82728 ASSAY OF FERRITIN: CPT

## 2025-06-24 PROCEDURE — 6360000002 HC RX W HCPCS: Performed by: NURSE PRACTITIONER

## 2025-06-24 PROCEDURE — 6360000002 HC RX W HCPCS: Performed by: FAMILY MEDICINE

## 2025-06-24 PROCEDURE — 80048 BASIC METABOLIC PNL TOTAL CA: CPT

## 2025-06-24 PROCEDURE — 82962 GLUCOSE BLOOD TEST: CPT

## 2025-06-24 PROCEDURE — 6360000002 HC RX W HCPCS: Performed by: INTERNAL MEDICINE

## 2025-06-24 PROCEDURE — 84145 PROCALCITONIN (PCT): CPT

## 2025-06-24 PROCEDURE — 94660 CPAP INITIATION&MGMT: CPT

## 2025-06-24 PROCEDURE — 51798 US URINE CAPACITY MEASURE: CPT

## 2025-06-24 PROCEDURE — 84100 ASSAY OF PHOSPHORUS: CPT

## 2025-06-24 PROCEDURE — 83550 IRON BINDING TEST: CPT

## 2025-06-24 PROCEDURE — 6370000000 HC RX 637 (ALT 250 FOR IP)

## 2025-06-24 PROCEDURE — 97530 THERAPEUTIC ACTIVITIES: CPT

## 2025-06-24 PROCEDURE — 82803 BLOOD GASES ANY COMBINATION: CPT

## 2025-06-24 PROCEDURE — 93010 ELECTROCARDIOGRAM REPORT: CPT | Performed by: INTERNAL MEDICINE

## 2025-06-24 PROCEDURE — 87899 AGENT NOS ASSAY W/OPTIC: CPT

## 2025-06-24 PROCEDURE — 97165 OT EVAL LOW COMPLEX 30 MIN: CPT

## 2025-06-24 RX ORDER — DEXMEDETOMIDINE HYDROCHLORIDE 4 UG/ML
.1-1.5 INJECTION, SOLUTION INTRAVENOUS CONTINUOUS
Status: DISCONTINUED | OUTPATIENT
Start: 2025-06-24 | End: 2025-06-28

## 2025-06-24 RX ORDER — LABETALOL HYDROCHLORIDE 5 MG/ML
20 INJECTION, SOLUTION INTRAVENOUS EVERY 4 HOURS PRN
Status: DISCONTINUED | OUTPATIENT
Start: 2025-06-24 | End: 2025-06-28

## 2025-06-24 RX ORDER — HYDRALAZINE HYDROCHLORIDE 20 MG/ML
10 INJECTION INTRAMUSCULAR; INTRAVENOUS EVERY 6 HOURS PRN
Status: DISCONTINUED | OUTPATIENT
Start: 2025-06-24 | End: 2025-06-28

## 2025-06-24 RX ORDER — FUROSEMIDE 10 MG/ML
20 INJECTION INTRAMUSCULAR; INTRAVENOUS ONCE
Status: COMPLETED | OUTPATIENT
Start: 2025-06-24 | End: 2025-06-24

## 2025-06-24 RX ORDER — DEXTROSE MONOHYDRATE 100 MG/ML
INJECTION, SOLUTION INTRAVENOUS CONTINUOUS PRN
Status: DISCONTINUED | OUTPATIENT
Start: 2025-06-24 | End: 2025-06-28

## 2025-06-24 RX ORDER — IPRATROPIUM BROMIDE AND ALBUTEROL SULFATE 2.5; .5 MG/3ML; MG/3ML
1 SOLUTION RESPIRATORY (INHALATION) ONCE
Status: COMPLETED | OUTPATIENT
Start: 2025-06-24 | End: 2025-06-24

## 2025-06-24 RX ORDER — INSULIN LISPRO 100 [IU]/ML
0-4 INJECTION, SOLUTION INTRAVENOUS; SUBCUTANEOUS
Status: DISCONTINUED | OUTPATIENT
Start: 2025-06-24 | End: 2025-06-28

## 2025-06-24 RX ORDER — BUMETANIDE 0.25 MG/ML
1 INJECTION, SOLUTION INTRAMUSCULAR; INTRAVENOUS ONCE
Status: COMPLETED | OUTPATIENT
Start: 2025-06-24 | End: 2025-06-24

## 2025-06-24 RX ADMIN — MEMANTINE 10 MG: 10 TABLET ORAL at 00:10

## 2025-06-24 RX ADMIN — SODIUM CHLORIDE 1750 MG: 0.9 INJECTION, SOLUTION INTRAVENOUS at 04:56

## 2025-06-24 RX ADMIN — MEMANTINE 10 MG: 10 TABLET ORAL at 11:26

## 2025-06-24 RX ADMIN — Medication 3 MG: at 00:10

## 2025-06-24 RX ADMIN — IPRATROPIUM BROMIDE AND ALBUTEROL SULFATE 1 DOSE: .5; 3 SOLUTION RESPIRATORY (INHALATION) at 01:45

## 2025-06-24 RX ADMIN — ARFORMOTEROL TARTRATE: 15 SOLUTION RESPIRATORY (INHALATION) at 07:54

## 2025-06-24 RX ADMIN — ENOXAPARIN SODIUM 40 MG: 100 INJECTION SUBCUTANEOUS at 11:26

## 2025-06-24 RX ADMIN — IPRATROPIUM BROMIDE AND ALBUTEROL SULFATE 1 DOSE: .5; 3 SOLUTION RESPIRATORY (INHALATION) at 07:48

## 2025-06-24 RX ADMIN — IPRATROPIUM BROMIDE AND ALBUTEROL SULFATE 1 DOSE: .5; 3 SOLUTION RESPIRATORY (INHALATION) at 00:15

## 2025-06-24 RX ADMIN — GUAIFENESIN 1200 MG: 600 TABLET, EXTENDED RELEASE ORAL at 11:26

## 2025-06-24 RX ADMIN — DEXMEDETOMIDINE HYDROCHLORIDE 0.2 MCG/KG/HR: 400 INJECTION INTRAVENOUS at 22:20

## 2025-06-24 RX ADMIN — DONEPEZIL HYDROCHLORIDE 10 MG: 5 TABLET, FILM COATED ORAL at 00:37

## 2025-06-24 RX ADMIN — GUAIFENESIN 1200 MG: 600 TABLET, EXTENDED RELEASE ORAL at 00:10

## 2025-06-24 RX ADMIN — CEFEPIME 2000 MG: 2 INJECTION, POWDER, FOR SOLUTION INTRAVENOUS at 16:53

## 2025-06-24 RX ADMIN — DOXYCYCLINE 100 MG: 100 INJECTION, POWDER, LYOPHILIZED, FOR SOLUTION INTRAVENOUS at 03:33

## 2025-06-24 RX ADMIN — IPRATROPIUM BROMIDE AND ALBUTEROL SULFATE 1 DOSE: .5; 3 SOLUTION RESPIRATORY (INHALATION) at 19:45

## 2025-06-24 RX ADMIN — SODIUM CHLORIDE, PRESERVATIVE FREE 10 ML: 5 INJECTION INTRAVENOUS at 11:28

## 2025-06-24 RX ADMIN — CARVEDILOL 6.25 MG: 6.25 TABLET, FILM COATED ORAL at 11:25

## 2025-06-24 RX ADMIN — DOXYCYCLINE 100 MG: 100 INJECTION, POWDER, LYOPHILIZED, FOR SOLUTION INTRAVENOUS at 14:25

## 2025-06-24 RX ADMIN — ALBUTEROL SULFATE 2.5 MG: 2.5 SOLUTION RESPIRATORY (INHALATION) at 00:46

## 2025-06-24 RX ADMIN — METHYLPREDNISOLONE SODIUM SUCCINATE 60 MG: 40 INJECTION, POWDER, FOR SOLUTION INTRAMUSCULAR; INTRAVENOUS at 16:38

## 2025-06-24 RX ADMIN — METHYLPREDNISOLONE SODIUM SUCCINATE 40 MG: 40 INJECTION, POWDER, FOR SOLUTION INTRAMUSCULAR; INTRAVENOUS at 11:27

## 2025-06-24 RX ADMIN — IPRATROPIUM BROMIDE AND ALBUTEROL SULFATE 1 DOSE: .5; 3 SOLUTION RESPIRATORY (INHALATION) at 11:09

## 2025-06-24 RX ADMIN — ONDANSETRON 4 MG: 2 INJECTION, SOLUTION INTRAMUSCULAR; INTRAVENOUS at 16:35

## 2025-06-24 RX ADMIN — WATER 40 MG: 1 INJECTION INTRAMUSCULAR; INTRAVENOUS; SUBCUTANEOUS at 01:31

## 2025-06-24 RX ADMIN — BUMETANIDE 1 MG: 0.25 INJECTION INTRAMUSCULAR; INTRAVENOUS at 14:18

## 2025-06-24 RX ADMIN — FUROSEMIDE 20 MG: 10 INJECTION, SOLUTION INTRAMUSCULAR; INTRAVENOUS at 01:53

## 2025-06-24 RX ADMIN — IPRATROPIUM BROMIDE AND ALBUTEROL SULFATE 1 DOSE: .5; 3 SOLUTION RESPIRATORY (INHALATION) at 15:15

## 2025-06-24 RX ADMIN — TAMSULOSIN HYDROCHLORIDE 0.4 MG: 0.4 CAPSULE ORAL at 11:25

## 2025-06-24 RX ADMIN — MONTELUKAST SODIUM 10 MG: 10 TABLET, FILM COATED ORAL at 11:26

## 2025-06-24 RX ADMIN — ATORVASTATIN CALCIUM 10 MG: 10 TABLET, FILM COATED ORAL at 11:25

## 2025-06-24 RX ADMIN — ARFORMOTEROL TARTRATE: 15 SOLUTION RESPIRATORY (INHALATION) at 00:10

## 2025-06-24 RX ADMIN — CARVEDILOL 6.25 MG: 6.25 TABLET, FILM COATED ORAL at 16:41

## 2025-06-24 RX ADMIN — LABETALOL HYDROCHLORIDE 20 MG: 5 INJECTION, SOLUTION INTRAVENOUS at 14:31

## 2025-06-24 RX ADMIN — HYDRALAZINE HYDROCHLORIDE 10 MG: 20 INJECTION INTRAMUSCULAR; INTRAVENOUS at 00:15

## 2025-06-24 RX ADMIN — ARFORMOTEROL TARTRATE: 15 SOLUTION RESPIRATORY (INHALATION) at 19:51

## 2025-06-24 RX ADMIN — WATER 2000 MG: 1 INJECTION INTRAMUSCULAR; INTRAVENOUS; SUBCUTANEOUS at 03:15

## 2025-06-24 NOTE — PROGRESS NOTES
Pt seen by night time intensivist, pls see separate note by Ms Bradnan    Pt down to midflow 12 liters. Still has some work of breathing. Per nursing pt intermittently confused and trying to get out of bed    Gen: mildly dyspnic at rest  Lungs: rales and rhonci bilaterally, moderate air movement  CV: RRR, no m/g/r  Abd: soft/nt/nd  LE: no edema    CXR 6/24 reviewed: bilateral lobe infiltrates. Left pleural effusion    A/P:  - Diureses  - continue IV methylpred- increase to 60 mg q8h   - wean O2 as tolerated  - coreg  - duonebs  - Continue cefepime/Doxy  - Enox PPx

## 2025-06-24 NOTE — ED NOTES
This RN attempted to stand patient to use urinal. Patient tolerated stand and pivot well, but was unable to urinate at this time.

## 2025-06-24 NOTE — PROGRESS NOTES
Physical Therapy Note:  Attempted to see patient for evaluation. Received by nsg at bedside and patient with visibly high resting WOB in preparation for pivot transfer to [facility]. Will defer at this time and continue to follow.

## 2025-06-24 NOTE — ED NOTES
This RN notified Chad Rocha MD regarding patient urinary status. Patient reports urge to urinate, but states he is unable to urinate.    Bladder Scan: 348 mL, MD aware.

## 2025-06-24 NOTE — PROGRESS NOTES
Rapid Called at 0122    Responded to RRT at 0123 for Increased WOB. RR 35, O2 98%. Breathing is labored. Placed on bipap. WOB has improved, RR 28. Pt now resting.     Provider at bedside: Yes  Interventions ordered: ABG  Transfer to Higher Level of Care: Na    Vitals:    06/24/25 0126   BP:    Pulse: 97   Resp: (!) 31   Temp:    SpO2: 98%        Rapid Ended at 0159  RRT RN assisted with transport to accepting unit NA.    Deo Collado, RN

## 2025-06-24 NOTE — CARE COORDINATION
Care Management Initial Assessment  6/24/2025 11:49 AM  If patient is discharged prior to next notation, then this note serves as note for discharge by case management.    Reason for Admission:   Hypoxia [R09.02]  COPD exacerbation (HCC) [J44.1]  Acute hypoxemic respiratory failure (HCC) [J96.01]         Patient Admission Status: Inpatient  Date Admitted to [facility]: 6/23/25  RUR: Readmission Risk Score: 19    Hospitalization in the last 30 days (Readmission):  no        Advance Care Planning:  Code Status: Full Code  Primary Healthcare Decision Maker:  AGGIE Cantu [phone]   Advance Directive: has an advanced directive - a copy has been provided     __________________________________________________________________________  Assessment:   On iv antibiotics  Lovenox sq  Alert and oriented x3  At savi of IDR ,pt was on heated high flow 50L/30% Fio2  +metapneumovirus  Comments: following for potential home health versus rehab needs     Discharge Concerns: []Yes [x]No []Unknown   Describe:    Financial concerns/barriers: []Yes, explain: [x]No []Unknown/Not discussed  __________________________________________________________________________    Insurer:   Active Insurance as of 6/23/2025       Primary Coverage       Payor Plan Insurance Group Employer/Plan Group    MEDICARE MEDICARE PART A AND B        Payor Address Payor Phone Number Payor Fax Number Effective Dates    [address] [address] [phone]  12/1/1999 - None Entered    [place], [place] [address]         Subscriber Name Subscriber Birth Date CHARLI Diaz 12/10/1934 [id number]               Secondary Coverage       Payor Plan Insurance Group Employer/Plan Group    UNITED HE[employer] AARP HEALTH CARE MEDICARE SUPP        Payor Plan Address Payor Plan Phone Number Payor Plan Fax Number Effective Dates    [address] [phone]  9/1/2023 - None Entered    [facility] [address]         Subscriber Name Subscriber Birth Date Member ID        CHARLI WILLINGHAM 12/10/1934 [id number]                     PCP: None, None   Address: None   Phone number: None    Pharmacy:   [id number] - [place], [place] - [address] - P [phone] - F [phone]  [address] [facility] [address]  Phone: [phone] Fax: [phone]    [id number] - [place], [place] - [address] - P [phone] - F [phone]  [address] [facility] [address]  Phone: [phone] Fax: [phone]    DC Transport: family        Transition of care plan:    [x]Unable to determine at this time. Awaiting clinical progress, and disposition recommendations.    [] Home. No assistance required.     [] Home. Pt refused recommended services.    [] Home with family assistance as needed, and outpatient follow-up.    [] Home with Outpatient PT and outpatient follow-up   Pt aware of OP appt? []Yes, Provider:   []Not scheduled   Transport provider:     [] Home with outpatient services.    Specify:    [] Home with Home Health   - [place] of Choice offered? [] Yes, Preference:   [] NA    []SNF/IPR   -[]Freedom of Choice offered, and preferences given:   []Listing provided and preferences requested   -Status: []Pending []Accepted:    -Auth required: []Yes []No    -Auth initiated date:   -3 midnight stay required: []Yes []No  Date satisfied:     [] LTC:     [] Home with Hospice   - [place] [facility] Choice offered? [] Yes, Preference:   [] NA    [] [facility] information provided.     [x] Other: first IMM -registration notified regarding need for first IMM      FERNIE DEXTER RN  Case Management Department  For questions or concerns, please Perfect[employer]erve

## 2025-06-24 NOTE — H&P
CRITICAL CARE ADMISSION NOTE    Name: Kevan Lynne   : 12/10/1934   MRN: [id number]   Date: 2025        ICU PROBLEM LIST   Acute on Chronic Hypoxemic/Hypercapnic Respiratory Failure  AECOPD  PNA    HISTORY OF PRESENT ILLNESS:   Kevan Lynne is a 89 y.o. with a PMH of BPH, COPD, dementia, HTN, HLD & macular degeneration of right eye who presents to [facility] ED on  with SOB.  He presented to ED with SpO2 86% on RA that improved with 2 L NC.  CXR demonstrating bibasilar interstitial lung disease.  Patient was admitted under the care of the internal medicine team with a diagnosis of acute on chronic hypoxemic respiratory failure/AECOPD.  In the early morning of  patient was noted to have increased WOB with respiratory rates in the 30s.  He was placed on BiPAP and ICU was consulted for evaluation.       NEUROLOGICAL:    Mild Dementia  - PTA Namenda, donezepil    PULMONOLOGY:   Acute on Chronic Hypoxemic/Hypercapnic Respiratory Failure  AECOPD  PNA  - Continue BiPAP  - CXR b/l lower lobe infiltrates   - Systemic/inhaled corticosteroids  - Scheduled DuoNebs  - ABX as below  - Baseline O2 2L NC    CARDIOVASCULAR:   Hx HTN, HLD  Elevated proBNP  - Troponin negative x 2  - PTA Coreg, statin  - Chronically elevated BNP, s/p lasix - continue diuresis PRN  - TTE (25) LVEF 55-60%, mild inferior-lateral hypokinesis    GASTROINTESTINAL:   No active issues  - N.p.o. while on BiPAP    RENAL/ELECTROLYTE/FLUIDS:   BPH  - Flomax  - Renal fnx wnl  - Trend BMP/Lytes     ENDOCRINE:   No active issues  - POCT BG every 6 while NPO  - Maintain euglycemia    HEMATOLOGY/ONCOLOGY:   Microcytic Anemia, Chronic  - Iron studies  - Hemoglobin at baseline (7.8)  - Trend CBC    ID/MICRO:   AECOPD  PNA  - Strep/leg/MRSA/Resp viral panel pending   - UA negative  - Nosocomial coverage with recent hospitalization (Cefepime/Vanc)  - Add Doxy    Is this patient to be included in the SEP-1 core measure? No Exclusion criteria - the

## 2025-06-24 NOTE — FLOWSHEET NOTE
Patient w/ significant increase in WOB. No recent exertion / concern for aspiration. Nebs and steroid given as ordered. Requiring straight cath x 1. Bipap initiated.     Tachypneic, course throughout, scattered rhonchi, bases diminished. Repeat CXR w/ little change in bilateral lower lobe pulmonary infiltrates. ABG 7.34/46/73.    Nursing to give Solu-Medrol now. Additional DuoNeb x 2. Trial Lasix for sx relief. Bladder scan q 4 and straight cath as needed. NPO while on bipap.  Low threshold for intubation due to concern for resp arrest. Patient is receptive to intubation as needed. Transfer to SD for continued monitoring. Consult placed to ICU for eval/ admit. Primary RN to provide update to family per patient approval.      Critical care time 45 mins, excluding procedures.

## 2025-06-24 NOTE — PROGRESS NOTES
Ultrasound IV by Tona Szymanski RN :  Procedure Note    Ultrasound IV education provided to patient. Opportunities for questions given.     Ultrasound used for PIV placement:  20gauge 1.75in BD Nexiva  L forearm location.  1 X Attempt(s).    Flushed with ease; vigorous blood return.     Procedure tolerated well. Primary RN aware of IV placement and added to LDA.      Tona Szymanski RN

## 2025-06-24 NOTE — PROGRESS NOTES
[facility] Dosing Services: Antimicrobial Stewardship Daily Doc  Consult for antibiotic dosing of Vancomycin by RAFI Gipson  Indication: PNA  Day of Therapy: 1 of  7    Ht Readings from Last 1 Encounters:   06/23/25 1.676 m (5' 6\")        Wt Readings from Last 1 Encounters:   06/23/25 73.9 kg (163 lb)      Vancomycin therapy:  Loading dose: Vancomycin 1750 mg x1 dose now/given  Maintenance dose: Vancomycin 1000 mg IV every 24 hours   Dose calculated to approximate a           a. Target AUC/AIDEN of 400-600          b. Trough of 15-20 mcg/mL   Last level:  mcg/mL  Plan:   Dose administration notes:     Non-Kinetic Antimicrobial Dosing Regimen:   Current Regimen:    Recommendation:   Dose administration notes:     Other Antimicrobial   (not dosed by pharmacist) Cefepime, doxycycline   Cultures    Serum Creatinine Lab Results   Component Value Date/Time    CREATININE 1.09 06/23/2025 07:07 PM          Creatinine Clearance Estimated Creatinine Clearance: 41 mL/min (based on SCr of 1.09 mg/dL).     Temp Temp: 98.2 °F (36.8 °C) (Oral)       WBC Lab Results   Component Value Date/Time    WBC 8.3 06/23/2025 07:07 PM          Procalcitonin Lab Results   Component Value Date/Time    PROCAL <0.05 04/13/2025 05:41 AM      For Antifungals, Metronidazole and Nafcillin: Lab Results   Component Value Date/Time    ALT 24 06/23/2025 07:07 PM    AST 39 06/23/2025 07:07 PM        Pharmacist: Yogesh Yee  [phone]

## 2025-06-24 NOTE — PLAN OF CARE
Problem: Safety - Adult  Goal: Free from fall injury  Outcome: Progressing     Problem: Chronic Conditions and Co-morbidities  Goal: Patient's chronic conditions and co-morbidity symptoms are monitored and maintained or improved  Outcome: Progressing  Flowsheets (Taken 6/24/2025 0800)  Care Plan - Patient's Chronic Conditions and Co-Morbidity Symptoms are Monitored and Maintained or Improved: Monitor and assess patient's chronic conditions and comorbid symptoms for stability, deterioration, or improvement     Problem: Discharge Planning  Goal: Discharge to home or other facility with appropriate resources  Outcome: Progressing  Flowsheets (Taken 6/24/2025 0800)  Discharge to home or other facility with appropriate resources:   Identify barriers to discharge with patient and caregiver   Arrange for needed discharge resources and transportation as appropriate   Identify discharge learning needs (meds, wound care, etc)     Problem: Skin/Tissue Integrity  Goal: Skin integrity remains intact  Description: 1.  Monitor for areas of redness and/or skin breakdown  2.  Assess vascular access sites hourly  3.  Every 4-6 hours minimum:  Change oxygen saturation probe site  4.  Every 4-6 hours:  If on nasal continuous positive airway pressure, respiratory therapy assess nares and determine need for appliance change or resting period  Outcome: Progressing  Flowsheets  Taken 6/24/2025 1108  Skin Integrity Remains Intact: Monitor for areas of redness and/or skin breakdown  Taken 6/24/2025 0800  Skin Integrity Remains Intact:   Monitor for areas of redness and/or skin breakdown   Turn and reposition as indicated     Problem: ABCDS Injury Assessment  Goal: Absence of physical injury  Outcome: Progressing  Flowsheets (Taken 6/24/2025 1108)  Absence of Physical Injury: Implement safety measures based on patient assessment     Problem: Respiratory - Adult  Goal: Achieves optimal ventilation and oxygenation  6/24/2025 1734 by Dawson

## 2025-06-24 NOTE — H&P
[facility]  [address] [place], [place]  [address] [phone]    Hospital Medicine History and Physical      NAME:       Kevan Lynne   :       12/10/1934   MRN:      [id number]     Date of service:   2025     Chief  Complaint:  Shortness of breath     History Of Presenting Illness:       Mr. Lynne is a 89 y.o. male who is being admitted for Acute hypoxemic respiratory failure (HCC). Mr. Lynne presented to our Emergency Department today complaining of  shortness of breath.  Patient is a 89-year-old male with a past medical history significant for COPD, mild cognitive impairment, hypertension and dyslipidemia who presents to the ER with complaint of shortness of breath.  Patient reports that he was seen at an urgent care facility yesterday and diagnosed with pneumonia.  He was started on Augmentin.  However, despite taking the antibiotic, his symptoms of fail to improve.  He is not currently taking any steroids.  Denies any other acute symptoms.    In the emergency department, basic labs including CBC and CMP demonstrated chronic anemia and chronically elevated BNP.  Vitals have been stable and within normal limits with exception of hypoxemia on room air.  Was 86% sats on room air.  Corrects easily with 2 L of supplemental O2 via nasal cannula.  Vitals also notable for hypertension.  Remainder of labs unremarkable.  Chest x-ray demonstrates stable bibasilar interstitial lung disease and severe emphysematous changes, no findings of acute pneumonia.  Due to need for oxygen and COPD exacerbation, patient was started on steroids and will be admitted for further evaluation and management.    No Known Allergies    Prior to Admission medications    Medication Sig Start Date End Date Taking? Authorizing Provider   amLODIPine (NORVASC) 5 MG tablet Take 1 tablet by

## 2025-06-24 NOTE — PROGRESS NOTES
**Patient not tolerating HFNC continues to rip it off, Placed on midflow 12lpm.      06/24/25 1109   Treatment   Treatment Type Aerosol generator   $Treatment Type $Inhaled Therapy/Meds   Medications Albuterol/Ipratropium   Pre-Tx Pulse 91   Pre-Tx Resps 23   Breath Sounds Pre-Tx LYDIA Coarse crackles   Breath Sounds Pre-Tx LLL Coarse crackles   Breath Sounds Pre-Tx RUL Coarse crackles   Breath Sounds Pre-Tx RML Coarse crackles   Breath Sounds Pre-Tx RLL Coarse crackles   Delivery Source In-line nebulizer   Position Sitting   Treatment Tolerance Well   Duration 10   Is patient on O2? Y   Breath Sounds   Respiratory Pattern Regular   Upper Airway Sounds Coarse  (wnl)   Breath Sounds Bilateral Coarse crackles   Right Upper Lobe Coarse crackles   Right Middle Lobe Coarse crackles   Right Lower Lobe Coarse crackles   Left Upper Lobe Coarse crackles   Left Lower Lobe Coarse crackles   Oxygen Therapy/Pulse Ox   O2 Therapy Oxygen humidified   O2 Device High flow nasal cannula  (MIDFLOW)   O2 Flow Rate (L/min) 12 L/min   Pulse 91   Respirations 27   SpO2 93 %   Skin Assessment Clean, dry, & intact   Skin Protection for O2 Device N/A

## 2025-06-24 NOTE — PLAN OF CARE
Problem: Occupational Therapy - Adult  Goal: By Discharge: Performs self-care activities at highest level of function for planned discharge setting.  See evaluation for individualized goals.  Description: FUNCTIONAL STATUS PRIOR TO ADMISSION:  Patient reports he was independent with ADLs and functional mobility/ ambulatory without AD, on 2L/min O2 NC at all times (hx COPD), cooking for himself, and receiving assist from family for laundry and medication management.  Note he had a [facility] admission in April 2025 followed by a rehab stay at [facility] before returning home.  He endorses no recent falls, although one in the past year due to slipping on mud outside.  Note history of MCI/ dementia per chart as well as R eye blindness + L eye macular degeneration.     HOME SUPPORT: Patient resided at home with his daughter and son-in-law, who he reports both work from home and are available to check-in on him.  He lives in a full basement apartment in their home.    Occupational Therapy Goals:  Initiated 6/24/2025  1.  Patient will perform grooming standing at sink with contact guard assist within 7 day(s).  2.  Patient will perform bathing with Contact Guard Assist within 7 day(s).  3.  Patient will perform lower body dressing with Contact Guard Assist within 7 day(s).  4.  Patient will perform toilet transfers with Contact Guard Assist  within 7 day(s).  5.  Patient will perform all aspects of toileting with Contact Guard Assist within 7 day(s).  6.  Patient will participate in upper extremity therapeutic exercise/activities with Supervision for 10 minutes within 7 day(s).    7.  Patient will utilize energy conservation techniques during functional activities with verbal cues within 7 day(s).    Outcome: Progressing     OCCUPATIONAL THERAPY EVALUATION    Patient: Kevan Lynne (89 y.o. male)  Date: 6/24/2025  Primary Diagnosis: Hypoxia [R09.02]  COPD exacerbation (HCC) [J44.1]  Acute hypoxemic respiratory failure (HCC)  Score: 53.32  ADL Inpatient CMS G-Code Modifier : CK     Interpretation of Tool:  Represents clinically-significant functional categories (i.e. Activities of daily living).  Cutoff score 39.4 (19) correlates to a good likelihood of discharging home versus a facility  Inna Parmar, Tiffanie Dixon, Stephen Flynn, Stephanie Carver, Tony Thomas, Manish Parmar, AM-PAC “6-Clicks” Functional Assessment Scores Predict Acute Care Hospital Discharge Destination, Physical Therapy, Volume 94, Issue 9, 1 September 2014, Pages 0504-7574, [email or web address]       Pain Rating:  Patient did not report pain    Activity Tolerance:   Fair , requires frequent rest breaks, and observed shortness of breath on exertion    After treatment:   Patient left in no apparent distress sitting up in chair, Call bell within reach, and Bed/ chair alarm activated, sitter present    COMMUNICATION/EDUCATION:   The patient's plan of care was discussed with: physical therapist and registered nurse    Patient Education  Education Given To: Patient  Education Provided: Role of Therapy;Energy Conservation  Education Method: Verbal  Barriers to Learning: Cognition  Education Outcome: Continued education needed    Thank you for this referral.  Ishan Villalta OT  Minutes: 33    Occupational Therapy Evaluation Charge Determination   History Examination Decision-Making   LOW Complexity : Brief history review  MEDIUM Complexity: 3-5 Performance deficits relating to physical, cognitive, or psychosocial skills that result in activity limitations and/or participation restrictions MEDIUM Complexity: Patient may present with comorbidities that affect occupational performance. Minimal to moderate modifications of tasks or assist (eg. physical or verbal) with assist is necessary to enable pt to complete eval   Based on the above components, the patient evaluation is determined to be of the following complexity level: Low

## 2025-06-24 NOTE — CARE COORDINATION
06/24/25 1538   Condition of Participation: Discharge Planning   The Plan for Transition of Care is related to the following treatment goals: rehab   The Patient and/or Patient Representative was provided with a Choice of Provider? Patient Representative   Name of the Patient Representative who was provided with the Choice of Provider and agrees with the Discharge Plan?  granddaughter   The Patient and/Or Patient Representative agree with the Discharge Plan? Yes  ([facility] preference)   Freedom of Choice list was provided with basic dialogue that supports the patient's individualized plan of care/goals, treatment preferences, and shares the quality data associated with the providers?  Yes     I contacted pt.'s granddaughter who is hoping pt can progress and go to [facility].  Currently it is premature to send clinicals to [facility] but I will once pt is more stable clinically.  Pt was there recently and did very well in rehab.    Jen Willoughby RN

## 2025-06-25 ENCOUNTER — APPOINTMENT (OUTPATIENT)
Facility: HOSPITAL | Age: 89
DRG: 193 | End: 2025-06-25
Payer: MEDICARE

## 2025-06-25 LAB
ABO + RH BLD: NORMAL
ANION GAP SERPL CALC-SCNC: 7 MMOL/L (ref 2–12)
BACTERIA SPEC CULT: NORMAL
BACTERIA SPEC CULT: NORMAL
BASOPHILS # BLD: 0 K/UL (ref 0–0.1)
BASOPHILS NFR BLD: 0 % (ref 0–1)
BLOOD GROUP ANTIBODIES SERPL: NORMAL
BUN SERPL-MCNC: 27 MG/DL (ref 6–20)
BUN/CREAT SERPL: 27 (ref 12–20)
CALCIUM SERPL-MCNC: 8.2 MG/DL (ref 8.5–10.1)
CHLORIDE SERPL-SCNC: 105 MMOL/L (ref 97–108)
CO2 SERPL-SCNC: 26 MMOL/L (ref 21–32)
CREAT SERPL-MCNC: 1.01 MG/DL (ref 0.7–1.3)
DIFFERENTIAL METHOD BLD: ABNORMAL
EOSINOPHIL # BLD: 0 K/UL (ref 0–0.4)
EOSINOPHIL NFR BLD: 0 % (ref 0–7)
ERYTHROCYTE [DISTWIDTH] IN BLOOD BY AUTOMATED COUNT: 20.1 % (ref 11.5–14.5)
GLUCOSE BLD STRIP.AUTO-MCNC: 159 MG/DL (ref 65–117)
GLUCOSE BLD STRIP.AUTO-MCNC: 163 MG/DL (ref 65–117)
GLUCOSE BLD STRIP.AUTO-MCNC: 182 MG/DL (ref 65–117)
GLUCOSE BLD STRIP.AUTO-MCNC: 188 MG/DL (ref 65–117)
GLUCOSE SERPL-MCNC: 140 MG/DL (ref 65–100)
HCT VFR BLD AUTO: 23.7 % (ref 36.6–50.3)
HGB BLD-MCNC: 6.6 G/DL (ref 12.1–17)
IMM GRANULOCYTES # BLD AUTO: 0.02 K/UL (ref 0–0.04)
IMM GRANULOCYTES NFR BLD AUTO: 0.3 % (ref 0–0.5)
LYMPHOCYTES # BLD: 0.69 K/UL (ref 0.8–3.5)
LYMPHOCYTES NFR BLD: 9.7 % (ref 12–49)
MAGNESIUM SERPL-MCNC: 2.2 MG/DL (ref 1.6–2.4)
MCH RBC QN AUTO: 19.8 PG (ref 26–34)
MCHC RBC AUTO-ENTMCNC: 27.8 G/DL (ref 30–36.5)
MCV RBC AUTO: 71.2 FL (ref 80–99)
MONOCYTES # BLD: 0.69 K/UL (ref 0–1)
MONOCYTES NFR BLD: 9.7 % (ref 5–13)
NEUTS SEG # BLD: 5.7 K/UL (ref 1.8–8)
NEUTS SEG NFR BLD: 80.3 % (ref 32–75)
NRBC # BLD: 0 K/UL (ref 0–0.01)
NRBC BLD-RTO: 0 PER 100 WBC
PHOSPHATE SERPL-MCNC: 4 MG/DL (ref 2.6–4.7)
PLATELET # BLD AUTO: 237 K/UL (ref 150–400)
PMV BLD AUTO: 10.9 FL (ref 8.9–12.9)
POTASSIUM SERPL-SCNC: 4.6 MMOL/L (ref 3.5–5.1)
RBC # BLD AUTO: 3.33 M/UL (ref 4.1–5.7)
RBC MORPH BLD: ABNORMAL
SERVICE CMNT-IMP: ABNORMAL
SERVICE CMNT-IMP: NORMAL
SODIUM SERPL-SCNC: 138 MMOL/L (ref 136–145)
SPECIMEN EXP DATE BLD: NORMAL
WBC # BLD AUTO: 7.1 K/UL (ref 4.1–11.1)

## 2025-06-25 PROCEDURE — 6360000002 HC RX W HCPCS: Performed by: NURSE PRACTITIONER

## 2025-06-25 PROCEDURE — 2500000003 HC RX 250 WO HCPCS: Performed by: NURSE PRACTITIONER

## 2025-06-25 PROCEDURE — 6370000000 HC RX 637 (ALT 250 FOR IP): Performed by: FAMILY MEDICINE

## 2025-06-25 PROCEDURE — 86900 BLOOD TYPING SEROLOGIC ABO: CPT

## 2025-06-25 PROCEDURE — 83735 ASSAY OF MAGNESIUM: CPT

## 2025-06-25 PROCEDURE — 94640 AIRWAY INHALATION TREATMENT: CPT

## 2025-06-25 PROCEDURE — 2000000000 HC ICU R&B

## 2025-06-25 PROCEDURE — 71250 CT THORAX DX C-: CPT

## 2025-06-25 PROCEDURE — 84100 ASSAY OF PHOSPHORUS: CPT

## 2025-06-25 PROCEDURE — 36415 COLL VENOUS BLD VENIPUNCTURE: CPT

## 2025-06-25 PROCEDURE — 6360000002 HC RX W HCPCS: Performed by: FAMILY MEDICINE

## 2025-06-25 PROCEDURE — 94761 N-INVAS EAR/PLS OXIMETRY MLT: CPT

## 2025-06-25 PROCEDURE — 85025 COMPLETE CBC W/AUTO DIFF WBC: CPT

## 2025-06-25 PROCEDURE — 2580000003 HC RX 258: Performed by: NURSE PRACTITIONER

## 2025-06-25 PROCEDURE — 51701 INSERT BLADDER CATHETER: CPT

## 2025-06-25 PROCEDURE — 80048 BASIC METABOLIC PNL TOTAL CA: CPT

## 2025-06-25 PROCEDURE — 86901 BLOOD TYPING SEROLOGIC RH(D): CPT

## 2025-06-25 PROCEDURE — 71045 X-RAY EXAM CHEST 1 VIEW: CPT

## 2025-06-25 PROCEDURE — 82962 GLUCOSE BLOOD TEST: CPT

## 2025-06-25 PROCEDURE — 2700000000 HC OXYGEN THERAPY PER DAY

## 2025-06-25 PROCEDURE — 2500000003 HC RX 250 WO HCPCS: Performed by: FAMILY MEDICINE

## 2025-06-25 PROCEDURE — 94660 CPAP INITIATION&MGMT: CPT

## 2025-06-25 PROCEDURE — 51798 US URINE CAPACITY MEASURE: CPT

## 2025-06-25 PROCEDURE — 6360000002 HC RX W HCPCS: Performed by: INTERNAL MEDICINE

## 2025-06-25 PROCEDURE — 86850 RBC ANTIBODY SCREEN: CPT

## 2025-06-25 RX ADMIN — DOXYCYCLINE 100 MG: 100 INJECTION, POWDER, LYOPHILIZED, FOR SOLUTION INTRAVENOUS at 01:33

## 2025-06-25 RX ADMIN — VANCOMYCIN HYDROCHLORIDE 1000 MG: 1 INJECTION, POWDER, LYOPHILIZED, FOR SOLUTION INTRAVENOUS at 04:20

## 2025-06-25 RX ADMIN — METHYLPREDNISOLONE SODIUM SUCCINATE 60 MG: 40 INJECTION, POWDER, FOR SOLUTION INTRAMUSCULAR; INTRAVENOUS at 16:37

## 2025-06-25 RX ADMIN — IPRATROPIUM BROMIDE AND ALBUTEROL SULFATE 1 DOSE: .5; 3 SOLUTION RESPIRATORY (INHALATION) at 15:08

## 2025-06-25 RX ADMIN — IPRATROPIUM BROMIDE AND ALBUTEROL SULFATE 1 DOSE: .5; 3 SOLUTION RESPIRATORY (INHALATION) at 11:20

## 2025-06-25 RX ADMIN — DEXMEDETOMIDINE HYDROCHLORIDE 0.4 MCG/KG/HR: 400 INJECTION INTRAVENOUS at 16:42

## 2025-06-25 RX ADMIN — METHYLPREDNISOLONE SODIUM SUCCINATE 60 MG: 40 INJECTION, POWDER, FOR SOLUTION INTRAMUSCULAR; INTRAVENOUS at 10:12

## 2025-06-25 RX ADMIN — SODIUM CHLORIDE, PRESERVATIVE FREE 10 ML: 5 INJECTION INTRAVENOUS at 10:14

## 2025-06-25 RX ADMIN — CEFEPIME 2000 MG: 2 INJECTION, POWDER, FOR SOLUTION INTRAVENOUS at 16:33

## 2025-06-25 RX ADMIN — METHYLPREDNISOLONE SODIUM SUCCINATE 60 MG: 40 INJECTION, POWDER, FOR SOLUTION INTRAMUSCULAR; INTRAVENOUS at 01:29

## 2025-06-25 RX ADMIN — IPRATROPIUM BROMIDE AND ALBUTEROL SULFATE 1 DOSE: .5; 3 SOLUTION RESPIRATORY (INHALATION) at 19:41

## 2025-06-25 RX ADMIN — IPRATROPIUM BROMIDE AND ALBUTEROL SULFATE 1 DOSE: .5; 3 SOLUTION RESPIRATORY (INHALATION) at 07:10

## 2025-06-25 RX ADMIN — ARFORMOTEROL TARTRATE: 15 SOLUTION RESPIRATORY (INHALATION) at 19:45

## 2025-06-25 RX ADMIN — DOXYCYCLINE 100 MG: 100 INJECTION, POWDER, LYOPHILIZED, FOR SOLUTION INTRAVENOUS at 14:50

## 2025-06-25 RX ADMIN — CEFEPIME 2000 MG: 2 INJECTION, POWDER, FOR SOLUTION INTRAVENOUS at 02:59

## 2025-06-25 RX ADMIN — ARFORMOTEROL TARTRATE: 15 SOLUTION RESPIRATORY (INHALATION) at 07:15

## 2025-06-25 RX ADMIN — ENOXAPARIN SODIUM 40 MG: 100 INJECTION SUBCUTANEOUS at 10:12

## 2025-06-25 ASSESSMENT — PAIN SCALES - GENERAL
PAINLEVEL_OUTOF10: 0
PAINLEVEL_OUTOF10: 0

## 2025-06-25 NOTE — PROGRESS NOTES
Met with granddaughter Papito and daughter Candida, shared POA. They expressed that pt had been deteriorating since having COVID 19 a couple of months ago and has been much more debilitated. Pt is already no CPR/shocks but to family's knowledge it has not been DNI. They do not feel that intubation would be in the pt's best interest should he get worse and would like to make him. If he does not improve in the next 24-48 hrs they would like to consider comfort care.   They would not like to proceed with blood transfusions at this time. Pt has had several in the past.   Palliative consulted.    Yes

## 2025-06-25 NOTE — CARE COORDINATION
Care Management Progress Note    Reason for Admission:   Hypoxia [R09.02]  COPD exacerbation (HCC) [J44.1]  Acute hypoxemic respiratory failure (HCC) [J96.01]         Patient Admission Status: Inpatient  RUR: 21%  Hospitalization in the last 30 days (Readmission):  no        Transition of care plan:  [Medical]  Pt is on continuous BiPaP  Pt is DNR/DNI  On Amadou hugger  [DC plan]  Discussions regarding pt in IDR regarding GOC  Discharge plan communicated with patient and/or discharge caregiver: yes with granddaughter     Date 1st IMM letter given: registration notified x 2 regarding need for first IMM  Outpatient follow-up.to be determined   Transport at discharge:  MYRNA Willoughby RN

## 2025-06-25 NOTE — PROGRESS NOTES
Spiritual Health History and Assessment/Progress Note  [facility]    Initial Encounter,  ,  ,      Name: Kevan yLnne MRN: [id number]    Age: 89 y.o.     Sex: male   Language: English   Roman Catholic: Baptist   Acute hypoxemic respiratory failure (HCC)     Date: 6/25/2025            Total Time Calculated: 38 min              Spiritual Assessment began in [facility] A4 INTENSIVE CARE UNIT            Encounter Overview/Reason: Initial Encounter  Service Provided For: Patient and family together    Ermelinda, Belief, Meaning:   Patient is connected with a ermelinda tradition or spiritual practice and has beliefs or practices that help with coping during difficult times  Family/Friends are connected with a ermelinda tradition or spiritual practice and have beliefs or practices that help with coping during difficult times      Importance and Influence:  Patient has spiritual/personal beliefs that influence decisions regarding their health  Family/Friends have spiritual/personal beliefs that influence decisions regarding the patient's health    Community:  Patient feels well-supported. Support system includes: Children and Extended family  Family/Friends are connected with a spiritual community: and feel well-supported. Support system includes: Ermelinda Community and Extended family    Assessment and Plan of Care:   Reviewed chart prior to bedside visit with Mr. Lynne and his family visiting. Pt is laying on his side where he is most comfortable with BiPap oxygen mask on, resting but awake; has his daughter and her son and his grand daughter present; Pt lives with grand daughter. Family shared family history but nothing medical. Mr Lynne expressed his appreciation through the mask and held out his hand to shake Chaplains hand in appreciation. Family shared that Pt holds a Denominational ermelinda; no denomination at this time but grew up Religious. Care plan was discussed and ultimately waiting on more test results. Family and Pt

## 2025-06-25 NOTE — ACP (ADVANCE CARE PLANNING)
Discussion with grand daughter/ POA discussing GOC and risks/ benefits of intubation outside cardiac arrest. Discussed multiple co-morbidities to include severe lung disease and dementia would make eventual extubation difficult. She reports she discussed with other family and would like to honor patient's DNR directives and additionally decided not to intubate outside of cardiac arrest either. Will continue with BIPAP at this time with Precedex gtt for comfort.    At this time patient is DNR/DNI, support OK including BIPAP if needed.    Ning Stewart, DENISEP-BC

## 2025-06-25 NOTE — PLAN OF CARE
Problem: Safety - Adult  Goal: Free from fall injury  Outcome: Progressing     Problem: Chronic Conditions and Co-morbidities  Goal: Patient's chronic conditions and co-morbidity symptoms are monitored and maintained or improved  Outcome: Progressing  Flowsheets (Taken 6/25/2025 0800)  Care Plan - Patient's Chronic Conditions and Co-Morbidity Symptoms are Monitored and Maintained or Improved: Monitor and assess patient's chronic conditions and comorbid symptoms for stability, deterioration, or improvement     Problem: Discharge Planning  Goal: Discharge to home or other facility with appropriate resources  Outcome: Progressing  Flowsheets (Taken 6/25/2025 0800)  Discharge to home or other facility with appropriate resources:   Identify barriers to discharge with patient and caregiver   Arrange for needed discharge resources and transportation as appropriate   Identify discharge learning needs (meds, wound care, etc)     Problem: Skin/Tissue Integrity  Goal: Skin integrity remains intact  Description: 1.  Monitor for areas of redness and/or skin breakdown  2.  Assess vascular access sites hourly  3.  Every 4-6 hours minimum:  Change oxygen saturation probe site  4.  Every 4-6 hours:  If on nasal continuous positive airway pressure, respiratory therapy assess nares and determine need for appliance change or resting period  Outcome: Progressing  Flowsheets  Taken 6/25/2025 1432  Skin Integrity Remains Intact: Monitor for areas of redness and/or skin breakdown  Taken 6/25/2025 0800  Skin Integrity Remains Intact:   Monitor for areas of redness and/or skin breakdown   Turn and reposition as indicated     Problem: ABCDS Injury Assessment  Goal: Absence of physical injury  Outcome: Progressing     Problem: Respiratory - Adult  Goal: Achieves optimal ventilation and oxygenation  6/25/2025 1621 by Olivia Osman RN  Outcome: Progressing  6/25/2025 0949 by Mohan Schumacher, RT  Outcome: Progressing  Flowsheets (Taken

## 2025-06-25 NOTE — PROGRESS NOTES
Physical therapy:    Chart reviewed and spoke with RN. Pt remains on BiPAP and RN at bedside placing denver hugger. Pt not medically stable for PT evaluation at this time.    Chyna Allison, PT, DPT

## 2025-06-25 NOTE — H&P
CRITICAL CARE ADMISSION NOTE    Name: Kevan Lynne   : 12/10/1934   MRN: [id number]   Date: 2025        ICU PROBLEM LIST   Acute on Chronic Hypoxemic/Hypercapnic Respiratory Failure  AECOPD  PNA    HISTORY OF PRESENT ILLNESS:   Kevan Lynne is a 89 y.o. with a PMH of BPH, COPD, dementia, HTN, HLD & macular degeneration of right eye who presents to [facility] ED on  with SOB.  He presented to ED with SpO2 86% on RA that improved with 2 L NC.  CXR demonstrating bibasilar interstitial lung disease.  Patient was admitted under the care of the internal medicine team with a diagnosis of acute on chronic hypoxemic respiratory failure/AECOPD.  In the early morning of  patient was noted to have increased WOB with respiratory rates in the 30s.  He was placed on BiPAP and ICU was consulted for evaluation.     : Persistnently on bipap. Agitated overnight, needing precedex. Currently sleeping but moves with noxious stimuli. Volumes 400-600 on bipap and RR 16-18.       NEUROLOGICAL:    Mild Dementia  - PTA Namenda, donezepil    PULMONOLOGY:   Acute on Chronic Hypoxemic/Hypercapnic Respiratory Failure  AECOPD  PNA  - Continue BiPAP  - CT chest to eval for PNA, edema or fibrosis  - Systemic/inhaled corticosteroids  - Scheduled DuoNebs  - ABX as below  - Baseline O2 2L NC    CARDIOVASCULAR:   Hx HTN, HLD  Elevated proBNP  - Troponin negative x 2  - PTA Coreg, statin  - Chronically elevated BNP, s/p lasix - continue diuresis PRN  - TTE (25) LVEF 55-60%, mild inferior-lateral hypokinesis    GASTROINTESTINAL:   No active issues  - N.p.o. while on BiPAP    RENAL/ELECTROLYTE/FLUIDS:   BPH  - Flomax  - Renal fnx wnl  - Trend BMP/Lytes     ENDOCRINE:   No active issues  - POCT BG every 6 while NPO  - Maintain euglycemia    HEMATOLOGY/ONCOLOGY:   Microcytic Anemia, Chronic  - Iron studies  - Hemoglobin at baseline (7.8)  - Trend CBC    ID/MICRO:   AECOPD  PNA  - Strep/leg/MRSA/Resp viral panel pending   - UA

## 2025-06-26 ENCOUNTER — APPOINTMENT (OUTPATIENT)
Facility: HOSPITAL | Age: 89
DRG: 193 | End: 2025-06-26
Payer: MEDICARE

## 2025-06-26 LAB
ANION GAP SERPL CALC-SCNC: 7 MMOL/L (ref 2–12)
BASOPHILS # BLD: 0 K/UL (ref 0–0.1)
BASOPHILS NFR BLD: 0 % (ref 0–1)
BUN SERPL-MCNC: 36 MG/DL (ref 6–20)
BUN/CREAT SERPL: 34 (ref 12–20)
CALCIUM SERPL-MCNC: 8.6 MG/DL (ref 8.5–10.1)
CHLORIDE SERPL-SCNC: 105 MMOL/L (ref 97–108)
CO2 SERPL-SCNC: 27 MMOL/L (ref 21–32)
CREAT SERPL-MCNC: 1.05 MG/DL (ref 0.7–1.3)
DATE LAST DOSE: ABNORMAL
DIFFERENTIAL METHOD BLD: ABNORMAL
DOSE AMOUNT: ABNORMAL UNITS
DOSE DATE/TIME: ABNORMAL
EOSINOPHIL # BLD: 0 K/UL (ref 0–0.4)
EOSINOPHIL NFR BLD: 0 % (ref 0–7)
ERYTHROCYTE [DISTWIDTH] IN BLOOD BY AUTOMATED COUNT: 20.2 % (ref 11.5–14.5)
GLUCOSE BLD STRIP.AUTO-MCNC: 149 MG/DL (ref 65–117)
GLUCOSE BLD STRIP.AUTO-MCNC: 166 MG/DL (ref 65–117)
GLUCOSE BLD STRIP.AUTO-MCNC: 175 MG/DL (ref 65–117)
GLUCOSE BLD STRIP.AUTO-MCNC: 218 MG/DL (ref 65–117)
GLUCOSE SERPL-MCNC: 130 MG/DL (ref 65–100)
HCT VFR BLD AUTO: 22.5 % (ref 36.6–50.3)
HGB BLD-MCNC: 6.3 G/DL (ref 12.1–17)
IMM GRANULOCYTES # BLD AUTO: 0.04 K/UL (ref 0–0.04)
IMM GRANULOCYTES NFR BLD AUTO: 0.5 % (ref 0–0.5)
L PNEUMO1 AG UR QL IA: NEGATIVE
LYMPHOCYTES # BLD: 0.55 K/UL (ref 0.8–3.5)
LYMPHOCYTES NFR BLD: 7 % (ref 12–49)
MAGNESIUM SERPL-MCNC: 2.5 MG/DL (ref 1.6–2.4)
MCH RBC QN AUTO: 20 PG (ref 26–34)
MCHC RBC AUTO-ENTMCNC: 28 G/DL (ref 30–36.5)
MCV RBC AUTO: 71.4 FL (ref 80–99)
MONOCYTES # BLD: 1.04 K/UL (ref 0–1)
MONOCYTES NFR BLD: 13.2 % (ref 5–13)
NEUTS SEG # BLD: 6.27 K/UL (ref 1.8–8)
NEUTS SEG NFR BLD: 79.3 % (ref 32–75)
NRBC # BLD: 0 K/UL (ref 0–0.01)
NRBC BLD-RTO: 0 PER 100 WBC
PHOSPHATE SERPL-MCNC: 3.7 MG/DL (ref 2.6–4.7)
PLATELET # BLD AUTO: 220 K/UL (ref 150–400)
PMV BLD AUTO: 10.4 FL (ref 8.9–12.9)
POTASSIUM SERPL-SCNC: 4.5 MMOL/L (ref 3.5–5.1)
RBC # BLD AUTO: 3.15 M/UL (ref 4.1–5.7)
RBC MORPH BLD: ABNORMAL
SERVICE CMNT-IMP: ABNORMAL
SODIUM SERPL-SCNC: 139 MMOL/L (ref 136–145)
SPECIMEN SOURCE: NORMAL
VANCOMYCIN TROUGH SERPL-MCNC: 11.4 UG/ML (ref 5–10)
WBC # BLD AUTO: 7.9 K/UL (ref 4.1–11.1)

## 2025-06-26 PROCEDURE — 6370000000 HC RX 637 (ALT 250 FOR IP): Performed by: INTERNAL MEDICINE

## 2025-06-26 PROCEDURE — 85025 COMPLETE CBC W/AUTO DIFF WBC: CPT

## 2025-06-26 PROCEDURE — 94660 CPAP INITIATION&MGMT: CPT

## 2025-06-26 PROCEDURE — 84100 ASSAY OF PHOSPHORUS: CPT

## 2025-06-26 PROCEDURE — 82962 GLUCOSE BLOOD TEST: CPT

## 2025-06-26 PROCEDURE — 6370000000 HC RX 637 (ALT 250 FOR IP): Performed by: FAMILY MEDICINE

## 2025-06-26 PROCEDURE — 51702 INSERT TEMP BLADDER CATH: CPT

## 2025-06-26 PROCEDURE — 94640 AIRWAY INHALATION TREATMENT: CPT

## 2025-06-26 PROCEDURE — 80048 BASIC METABOLIC PNL TOTAL CA: CPT

## 2025-06-26 PROCEDURE — 71045 X-RAY EXAM CHEST 1 VIEW: CPT

## 2025-06-26 PROCEDURE — 2500000003 HC RX 250 WO HCPCS: Performed by: NURSE PRACTITIONER

## 2025-06-26 PROCEDURE — 80202 ASSAY OF VANCOMYCIN: CPT

## 2025-06-26 PROCEDURE — 2500000003 HC RX 250 WO HCPCS: Performed by: FAMILY MEDICINE

## 2025-06-26 PROCEDURE — 6360000002 HC RX W HCPCS: Performed by: INTERNAL MEDICINE

## 2025-06-26 PROCEDURE — 6360000002 HC RX W HCPCS: Performed by: NURSE PRACTITIONER

## 2025-06-26 PROCEDURE — 6360000002 HC RX W HCPCS: Performed by: FAMILY MEDICINE

## 2025-06-26 PROCEDURE — 36415 COLL VENOUS BLD VENIPUNCTURE: CPT

## 2025-06-26 PROCEDURE — 83735 ASSAY OF MAGNESIUM: CPT

## 2025-06-26 PROCEDURE — 51798 US URINE CAPACITY MEASURE: CPT

## 2025-06-26 PROCEDURE — 94761 N-INVAS EAR/PLS OXIMETRY MLT: CPT

## 2025-06-26 PROCEDURE — 2580000003 HC RX 258: Performed by: NURSE PRACTITIONER

## 2025-06-26 PROCEDURE — 2000000000 HC ICU R&B

## 2025-06-26 RX ORDER — MORPHINE SULFATE 4 MG/ML
2 INJECTION, SOLUTION INTRAMUSCULAR; INTRAVENOUS
Refills: 0 | Status: DISCONTINUED | OUTPATIENT
Start: 2025-06-26 | End: 2025-06-28

## 2025-06-26 RX ORDER — IPRATROPIUM BROMIDE AND ALBUTEROL SULFATE 2.5; .5 MG/3ML; MG/3ML
1 SOLUTION RESPIRATORY (INHALATION) EVERY 4 HOURS PRN
Status: DISCONTINUED | OUTPATIENT
Start: 2025-06-26 | End: 2025-06-29 | Stop reason: HOSPADM

## 2025-06-26 RX ADMIN — MORPHINE SULFATE 2 MG: 4 INJECTION INTRAVENOUS at 20:52

## 2025-06-26 RX ADMIN — CEFEPIME 2000 MG: 2 INJECTION, POWDER, FOR SOLUTION INTRAVENOUS at 03:14

## 2025-06-26 RX ADMIN — MORPHINE SULFATE 2 MG: 4 INJECTION INTRAVENOUS at 22:49

## 2025-06-26 RX ADMIN — CEFEPIME 2000 MG: 2 INJECTION, POWDER, FOR SOLUTION INTRAVENOUS at 14:12

## 2025-06-26 RX ADMIN — METHYLPREDNISOLONE SODIUM SUCCINATE 60 MG: 40 INJECTION, POWDER, FOR SOLUTION INTRAMUSCULAR; INTRAVENOUS at 01:59

## 2025-06-26 RX ADMIN — IPRATROPIUM BROMIDE AND ALBUTEROL SULFATE 1 DOSE: .5; 3 SOLUTION RESPIRATORY (INHALATION) at 08:00

## 2025-06-26 RX ADMIN — ENOXAPARIN SODIUM 40 MG: 100 INJECTION SUBCUTANEOUS at 08:01

## 2025-06-26 RX ADMIN — DOXYCYCLINE 100 MG: 100 INJECTION, POWDER, LYOPHILIZED, FOR SOLUTION INTRAVENOUS at 14:33

## 2025-06-26 RX ADMIN — SODIUM CHLORIDE, PRESERVATIVE FREE 10 ML: 5 INJECTION INTRAVENOUS at 20:56

## 2025-06-26 RX ADMIN — ARFORMOTEROL TARTRATE: 15 SOLUTION RESPIRATORY (INHALATION) at 19:43

## 2025-06-26 RX ADMIN — IPRATROPIUM BROMIDE AND ALBUTEROL SULFATE 1 DOSE: .5; 3 SOLUTION RESPIRATORY (INHALATION) at 19:35

## 2025-06-26 RX ADMIN — ARFORMOTEROL TARTRATE: 15 SOLUTION RESPIRATORY (INHALATION) at 08:04

## 2025-06-26 RX ADMIN — HYDRALAZINE HYDROCHLORIDE 10 MG: 20 INJECTION INTRAMUSCULAR; INTRAVENOUS at 19:44

## 2025-06-26 RX ADMIN — METHYLPREDNISOLONE SODIUM SUCCINATE 60 MG: 40 INJECTION, POWDER, FOR SOLUTION INTRAMUSCULAR; INTRAVENOUS at 16:40

## 2025-06-26 RX ADMIN — DEXMEDETOMIDINE HYDROCHLORIDE 0.4 MCG/KG/HR: 400 INJECTION INTRAVENOUS at 06:22

## 2025-06-26 RX ADMIN — METHYLPREDNISOLONE SODIUM SUCCINATE 60 MG: 40 INJECTION, POWDER, FOR SOLUTION INTRAMUSCULAR; INTRAVENOUS at 07:37

## 2025-06-26 RX ADMIN — SODIUM CHLORIDE, PRESERVATIVE FREE 10 ML: 5 INJECTION INTRAVENOUS at 07:38

## 2025-06-26 RX ADMIN — INSULIN LISPRO 1 UNITS: 100 INJECTION, SOLUTION INTRAVENOUS; SUBCUTANEOUS at 21:00

## 2025-06-26 RX ADMIN — DOXYCYCLINE 100 MG: 100 INJECTION, POWDER, LYOPHILIZED, FOR SOLUTION INTRAVENOUS at 02:00

## 2025-06-26 RX ADMIN — VANCOMYCIN HYDROCHLORIDE 1000 MG: 1 INJECTION, POWDER, LYOPHILIZED, FOR SOLUTION INTRAVENOUS at 04:10

## 2025-06-26 RX ADMIN — IPRATROPIUM BROMIDE AND ALBUTEROL SULFATE 1 DOSE: .5; 3 SOLUTION RESPIRATORY (INHALATION) at 13:24

## 2025-06-26 ASSESSMENT — PAIN SCALES - GENERAL
PAINLEVEL_OUTOF10: 0

## 2025-06-26 NOTE — PLAN OF CARE
Problem: Safety - Adult  Goal: Free from fall injury  Outcome: Progressing     Problem: Skin/Tissue Integrity  Goal: Skin integrity remains intact  Description: 1.  Monitor for areas of redness and/or skin breakdown  2.  Assess vascular access sites hourly  3.  Every 4-6 hours minimum:  Change oxygen saturation probe site  4.  Every 4-6 hours:  If on nasal continuous positive airway pressure, respiratory therapy assess nares and determine need for appliance change or resting period  Outcome: Progressing  Flowsheets  Taken 6/26/2025 0755 by Jeni Morales RN  Skin Integrity Remains Intact:   Monitor for areas of redness and/or skin breakdown   Assess vascular access sites hourly  Taken 6/26/2025 0748 by Jeni Morales RN  Skin Integrity Remains Intact:   Monitor for areas of redness and/or skin breakdown   Assess vascular access sites hourly  Taken 6/26/2025 0015 by Sanam Rodarte RN  Skin Integrity Remains Intact:   Assess vascular access sites hourly   Monitor for areas of redness and/or skin breakdown  Taken 6/25/2025 2015 by Sanam Rodarte RN  Skin Integrity Remains Intact:   Monitor for areas of redness and/or skin breakdown   Assess vascular access sites hourly     Problem: ABCDS Injury Assessment  Goal: Absence of physical injury  Outcome: Progressing     Problem: Pain  Goal: Verbalizes/displays adequate comfort level or baseline comfort level  Outcome: Progressing  Flowsheets (Taken 6/26/2025 0745)  Verbalizes/displays adequate comfort level or baseline comfort level:   Encourage patient to monitor pain and request assistance   Assess pain using appropriate pain scale

## 2025-06-26 NOTE — PROGRESS NOTES
Occupational/ Physical Therapy: hold    Chart reviewed in preparation for OT/PT.  Note patient remains on BiPAP and hgb continues to decrease, now 6.3.  Will hold activity at this time.    Ishan Alexander, OTR/L

## 2025-06-26 NOTE — PLAN OF CARE
Problem: Respiratory - Adult  Goal: Achieves optimal ventilation and oxygenation  6/26/2025 1944 by Ember Win, RT  Outcome: Progressing  Flowsheets  Taken 6/26/2025 1524 by Jeni Morales RN  Achieves optimal ventilation and oxygenation:   Assess for changes in respiratory status   Assess for changes in mentation and behavior   Position to facilitate oxygenation and minimize respiratory effort   Oxygen supplementation based on oxygen saturation or arterial blood gases  Taken 6/26/2025 1126 by Jeni Morales RN  Achieves optimal ventilation and oxygenation:   Assess for changes in respiratory status   Assess for changes in mentation and behavior   Position to facilitate oxygenation and minimize respiratory effort   Oxygen supplementation based on oxygen saturation or arterial blood gases  6/26/2025 0815 by Damico, Kevin J, RT  Outcome: Not Progressing  Flowsheets (Taken 6/26/2025 0748 by Jeni Morales RN)  Achieves optimal ventilation and oxygenation:   Assess for changes in respiratory status   Assess for changes in mentation and behavior   Position to facilitate oxygenation and minimize respiratory effort   Oxygen supplementation based on oxygen saturation or arterial blood gases

## 2025-06-26 NOTE — PROGRESS NOTES
Name: Kevan Lynne   : 12/10/1934   MRN: [id number]   Date: 2025        Chief Complaint   Patient presents with    Shortness of Breath         HPI:     89 y.o. with a PMH of BPH, COPD, dementia, HTN, HLD & macular degeneration of right eye who presents to [facility] ED on  with SOB.  He presented to ED with SpO2 86% on RA that improved with 2 L NC.  CXR demonstrating bibasilar interstitial lung disease.  Patient was admitted under the care of the internal medicine team with a diagnosis of acute on chronic hypoxemic respiratory failure/AECOPD.  In the early morning of  patient was noted to have increased WOB with respiratory rates in the 30s.  He was placed on BiPAP and ICU was consulted for evaluation.     Active Problems Being Managed:   Acute on Chronic Hypoxemic/Hypercapnic Respiratory Failure  AECOPD  PNA      Assessment/Plan:     NEUROLOGICAL:      - continue precedex  - PTA Namenda, donezepil     PULMONOLOGY:     - Continue BiPAP  - Systemic/inhaled corticosteroids  - Scheduled DuoNebs       CARDIOVASCULAR:   - MAP goal > 65  - Troponin negative x 2  - PTA Coreg, statin  - Chronically elevated BNP, s/p lasix - continue diuresis PRN  - TTE (25) LVEF 55-60%, mild inferior-lateral hypokinesis     GASTROINTESTINAL:   No active issues  - N.p.o. while on BiPAP     RENAL/ELECTROLYTE/FLUIDS:   BPH  - Flomax  - Renal fnx wnl  - Trend BMP/Lytes      ENDOCRINE:   No active issues  - POCT BG every 6 while NPO  - Maintain euglycemia     HEMATOLOGY/ONCOLOGY:   Microcytic Anemia, Chronic  - Iron studies  - Hemoglobin at baseline (7.8)  - Trend CBC; Hgb 6.3 - family has refused transfusion     ID/MICRO:     - Human metapneumovirus - positive  - MRSA/Resp viral panel - negative  - UA negative  - Nosocomial coverage with recent hospitalization (Cefepime/Vanc)  - Doxy for 7 days        ICU DAILY CHECKLIST   Code Status:DNR  DVT Prophylaxis:enoxaparin  T/L/D: Tubes: None  Lines: Peripheral IV  Drains:

## 2025-06-27 LAB
ANION GAP SERPL CALC-SCNC: 6 MMOL/L (ref 2–12)
BASE EXCESS BLDV CALC-SCNC: 3 MMOL/L
BUN SERPL-MCNC: 37 MG/DL (ref 6–20)
BUN/CREAT SERPL: 31 (ref 12–20)
CALCIUM SERPL-MCNC: 8.9 MG/DL (ref 8.5–10.1)
CHLORIDE SERPL-SCNC: 108 MMOL/L (ref 97–108)
CO2 SERPL-SCNC: 27 MMOL/L (ref 21–32)
CREAT SERPL-MCNC: 1.19 MG/DL (ref 0.7–1.3)
ERYTHROCYTE [DISTWIDTH] IN BLOOD BY AUTOMATED COUNT: 20.9 % (ref 11.5–14.5)
GLUCOSE BLD STRIP.AUTO-MCNC: 137 MG/DL (ref 65–117)
GLUCOSE BLD STRIP.AUTO-MCNC: 144 MG/DL (ref 65–117)
GLUCOSE BLD STRIP.AUTO-MCNC: 154 MG/DL (ref 65–117)
GLUCOSE BLD STRIP.AUTO-MCNC: 194 MG/DL (ref 65–117)
GLUCOSE SERPL-MCNC: 143 MG/DL (ref 65–100)
HCO3 BLDV-SCNC: 28 MMOL/L (ref 23–28)
HCT VFR BLD AUTO: 25.5 % (ref 36.6–50.3)
HGB BLD-MCNC: 7 G/DL (ref 12.1–17)
MCH RBC QN AUTO: 19.6 PG (ref 26–34)
MCHC RBC AUTO-ENTMCNC: 27.5 G/DL (ref 30–36.5)
MCV RBC AUTO: 71.2 FL (ref 80–99)
NRBC # BLD: 0 K/UL (ref 0–0.01)
NRBC BLD-RTO: 0 PER 100 WBC
PCO2 BLDV: 44.5 MMHG (ref 41–51)
PH BLDV: 7.41 (ref 7.32–7.42)
PLATELET # BLD AUTO: 247 K/UL (ref 150–400)
PO2 BLDV: 67 MMHG (ref 25–40)
POTASSIUM SERPL-SCNC: 4.2 MMOL/L (ref 3.5–5.1)
RBC # BLD AUTO: 3.58 M/UL (ref 4.1–5.7)
SAO2 % BLDV: 93.1 % (ref 65–88)
SERVICE CMNT-IMP: ABNORMAL
SODIUM SERPL-SCNC: 141 MMOL/L (ref 136–145)
SPECIMEN TYPE: ABNORMAL
WBC # BLD AUTO: 12.2 K/UL (ref 4.1–11.1)

## 2025-06-27 PROCEDURE — 2000000000 HC ICU R&B

## 2025-06-27 PROCEDURE — 6360000002 HC RX W HCPCS: Performed by: NURSE PRACTITIONER

## 2025-06-27 PROCEDURE — 6370000000 HC RX 637 (ALT 250 FOR IP): Performed by: INTERNAL MEDICINE

## 2025-06-27 PROCEDURE — 2500000003 HC RX 250 WO HCPCS: Performed by: FAMILY MEDICINE

## 2025-06-27 PROCEDURE — 94761 N-INVAS EAR/PLS OXIMETRY MLT: CPT

## 2025-06-27 PROCEDURE — 82803 BLOOD GASES ANY COMBINATION: CPT

## 2025-06-27 PROCEDURE — 2580000003 HC RX 258: Performed by: NURSE PRACTITIONER

## 2025-06-27 PROCEDURE — 2700000000 HC OXYGEN THERAPY PER DAY

## 2025-06-27 PROCEDURE — 94640 AIRWAY INHALATION TREATMENT: CPT

## 2025-06-27 PROCEDURE — 94660 CPAP INITIATION&MGMT: CPT

## 2025-06-27 PROCEDURE — 2500000003 HC RX 250 WO HCPCS: Performed by: NURSE PRACTITIONER

## 2025-06-27 PROCEDURE — 80048 BASIC METABOLIC PNL TOTAL CA: CPT

## 2025-06-27 PROCEDURE — 6370000000 HC RX 637 (ALT 250 FOR IP): Performed by: NURSE PRACTITIONER

## 2025-06-27 PROCEDURE — 82962 GLUCOSE BLOOD TEST: CPT

## 2025-06-27 PROCEDURE — 36415 COLL VENOUS BLD VENIPUNCTURE: CPT

## 2025-06-27 PROCEDURE — 6360000002 HC RX W HCPCS: Performed by: FAMILY MEDICINE

## 2025-06-27 PROCEDURE — 85027 COMPLETE CBC AUTOMATED: CPT

## 2025-06-27 PROCEDURE — 6360000002 HC RX W HCPCS

## 2025-06-27 PROCEDURE — 6360000002 HC RX W HCPCS: Performed by: INTERNAL MEDICINE

## 2025-06-27 RX ORDER — MIDAZOLAM HYDROCHLORIDE 1 MG/ML
INJECTION, SOLUTION INTRAMUSCULAR; INTRAVENOUS
Status: COMPLETED
Start: 2025-06-27 | End: 2025-06-27

## 2025-06-27 RX ORDER — MIDAZOLAM HYDROCHLORIDE 1 MG/ML
2 INJECTION, SOLUTION INTRAMUSCULAR; INTRAVENOUS ONCE
Status: COMPLETED | OUTPATIENT
Start: 2025-06-27 | End: 2025-06-27

## 2025-06-27 RX ADMIN — ENOXAPARIN SODIUM 40 MG: 100 INJECTION SUBCUTANEOUS at 09:30

## 2025-06-27 RX ADMIN — ARFORMOTEROL TARTRATE: 15 SOLUTION RESPIRATORY (INHALATION) at 08:05

## 2025-06-27 RX ADMIN — HYDRALAZINE HYDROCHLORIDE 10 MG: 20 INJECTION INTRAMUSCULAR; INTRAVENOUS at 19:30

## 2025-06-27 RX ADMIN — CEFEPIME 2000 MG: 2 INJECTION, POWDER, FOR SOLUTION INTRAVENOUS at 02:24

## 2025-06-27 RX ADMIN — METHYLPREDNISOLONE SODIUM SUCCINATE 60 MG: 40 INJECTION, POWDER, FOR SOLUTION INTRAMUSCULAR; INTRAVENOUS at 09:30

## 2025-06-27 RX ADMIN — MIDAZOLAM HYDROCHLORIDE 2 MG: 1 INJECTION, SOLUTION INTRAMUSCULAR; INTRAVENOUS at 10:55

## 2025-06-27 RX ADMIN — SODIUM CHLORIDE, PRESERVATIVE FREE 10 ML: 5 INJECTION INTRAVENOUS at 19:43

## 2025-06-27 RX ADMIN — METHYLPREDNISOLONE SODIUM SUCCINATE 60 MG: 40 INJECTION, POWDER, FOR SOLUTION INTRAMUSCULAR; INTRAVENOUS at 16:21

## 2025-06-27 RX ADMIN — HYDRALAZINE HYDROCHLORIDE 10 MG: 20 INJECTION INTRAMUSCULAR; INTRAVENOUS at 13:05

## 2025-06-27 RX ADMIN — IPRATROPIUM BROMIDE AND ALBUTEROL SULFATE 1 DOSE: .5; 3 SOLUTION RESPIRATORY (INHALATION) at 21:11

## 2025-06-27 RX ADMIN — INSULIN LISPRO 1 UNITS: 100 INJECTION, SOLUTION INTRAVENOUS; SUBCUTANEOUS at 11:17

## 2025-06-27 RX ADMIN — MORPHINE SULFATE 2 MG: 4 INJECTION INTRAVENOUS at 19:30

## 2025-06-27 RX ADMIN — MORPHINE SULFATE 2 MG: 4 INJECTION INTRAVENOUS at 12:51

## 2025-06-27 RX ADMIN — ARFORMOTEROL TARTRATE: 15 SOLUTION RESPIRATORY (INHALATION) at 21:05

## 2025-06-27 RX ADMIN — MORPHINE SULFATE 2 MG: 4 INJECTION INTRAVENOUS at 10:52

## 2025-06-27 RX ADMIN — METHYLPREDNISOLONE SODIUM SUCCINATE 60 MG: 40 INJECTION, POWDER, FOR SOLUTION INTRAMUSCULAR; INTRAVENOUS at 00:33

## 2025-06-27 RX ADMIN — DEXMEDETOMIDINE HYDROCHLORIDE 0.6 MCG/KG/HR: 400 INJECTION INTRAVENOUS at 01:06

## 2025-06-27 RX ADMIN — MIDAZOLAM 2 MG: 1 INJECTION INTRAMUSCULAR; INTRAVENOUS at 10:55

## 2025-06-27 RX ADMIN — CEFEPIME 2000 MG: 2 INJECTION, POWDER, FOR SOLUTION INTRAVENOUS at 15:25

## 2025-06-27 RX ADMIN — SODIUM CHLORIDE, PRESERVATIVE FREE 10 ML: 5 INJECTION INTRAVENOUS at 07:41

## 2025-06-27 NOTE — PROGRESS NOTES
Name: Kevan Lynne   : 12/10/1934   MRN: [id number]   Date: 2025        Chief Complaint   Patient presents with    Shortness of Breath         HPI:     89 y.o. with a PMH of BPH, COPD, dementia, HTN, HLD & macular degeneration of right eye who presents to [facility] ED on  with SOB.  He presented to ED with SpO2 86% on RA that improved with 2 L NC.  CXR demonstrating bibasilar interstitial lung disease.  Patient was admitted under the care of the internal medicine team with a diagnosis of acute on chronic hypoxemic respiratory failure/AECOPD.  In the early morning of  patient was noted to have increased WOB with respiratory rates in the 30s.  He was placed on BiPAP and ICU was consulted for evaluation.     Active Problems Being Managed:   Acute on Chronic Hypoxemic/Hypercapnic Respiratory Failure  AECOPD  PNA      Assessment/Plan:     Pt had acute decompensation off bipap over night requiring restarting bipap and morphine. He had another episode this morning which required increasing FiO2, morphine and versed. His COPD is severe and he is not tolerating weaning off bipap.    Had meeting with patient and his children to discuss option of determining when it waould be appropriate to move to comfort care since pt is DNI and is not tolerating bipap wean. Family acknowledged pt;' lung function had been deteriorating even prior to this admission.    NEUROLOGICAL:      - continue precedex  - PTA Namenda, donezepil     PULMONOLOGY:     - Continue BiPAP  - Systemic/inhaled corticosteroids  - Scheduled DuoNebs       CARDIOVASCULAR:   - MAP goal > 65  - Troponin negative x 2  - PTA Coreg, statin  - Chronically elevated BNP, s/p lasix - continue diuresis PRN  - TTE (25) LVEF 55-60%, mild inferior-lateral hypokinesis     GASTROINTESTINAL:   No active issues  - N.p.o. while on BiPAP     RENAL/ELECTROLYTE/FLUIDS:   BPH  - Flomax  - Renal fnx wnl  - Trend BMP/Lytes      ENDOCRINE:   No active issues  - POCT  had a face to face encounter with the patient, reviewed and interpreted patient data including clinical events, labs, images, vital signs, I/O's, and examined patient.  I have discussed the case and the plan and management of the patient's care with the consulting services, the bedside nurses and the respiratory therapist.      NOTE OF PERSONAL INVOLVEMENT IN CARE   This patient has a high probability of imminent, clinically significant deterioration, which requires the highest level of preparedness to intervene urgently. I participated in the decision-making and personally managed or directed the management of the following life and organ supporting interventions that required my frequent assessment to treat or prevent imminent deterioration.        Brandon Mariano MD   Critical Care Medicine  [facility]  [phone]  6/27/2025

## 2025-06-27 NOTE — PROGRESS NOTES
Spiritual Health History and Assessment/Progress Note  [facility]    Follow-up,  ,  ,      Name: Kevan Lynne MRN: [id number]    Age: 89 y.o.     Sex: male   Language: English   Nondenominational: Mosque   Acute hypoxemic respiratory failure (HCC)     Date: 6/27/2025            Total Time Calculated: 20 min              Spiritual Assessment continued in [facility] A4 INTENSIVE CARE UNIT        Referral/Consult From: Other    Encounter Overview/Reason: Follow-up  Service Provided For: Patient and family together    Ermelinda, Belief, Meaning:   Patient identifies as spiritual, is connected with a ermelinda tradition or spiritual practice, and has beliefs or practices that help with coping during difficult times  Family/Friends identify as spiritual, are connected with a ermelinda tradition or spiritual practice, and have beliefs or practices that help with coping during difficult times      Importance and Influence:  Patient has spiritual/personal beliefs that influence decisions regarding their health  Family/Friends have spiritual/personal beliefs that influence decisions regarding the patient's health    Community:  Patient feels well-supported. Support system includes: Children, Friends, and Extended family  Family/Friends feel well-supported. Support system includes: Children, Friends, and Extended family    Assessment and Plan of Care:   Referral to visit Pt and family after new care plan. Reviewed chart prior to bedside visit with Mr. Lynne and his family present. Mr Lynne is on BiPaP mask and saying that he appreciates  coming back again. Pt's daughter and other family members are tearful and share care plan before asking  to have prayer.  held prayer with all of them and Mr Lynne saying Amen throughout prayer. He held out his hand in appreciation as did family members.       Patient Interventions include: Facilitated expression of thoughts and feelings, Engaged in theological

## 2025-06-27 NOTE — PLAN OF CARE
Problem: Chronic Conditions and Co-morbidities  Goal: Patient's chronic conditions and co-morbidity symptoms are monitored and maintained or improved  Outcome: Not Progressing  Flowsheets (Taken 6/26/2025 1524 by Jeni Morales RN)  Care Plan - Patient's Chronic Conditions and Co-Morbidity Symptoms are Monitored and Maintained or Improved: Monitor and assess patient's chronic conditions and comorbid symptoms for stability, deterioration, or improvement     Problem: Discharge Planning  Goal: Discharge to home or other facility with appropriate resources  Outcome: Not Progressing  Flowsheets (Taken 6/26/2025 1524 by Jeni Morales RN)  Discharge to home or other facility with appropriate resources: Identify barriers to discharge with patient and caregiver     Problem: Respiratory - Adult  Goal: Achieves optimal ventilation and oxygenation  6/27/2025 0326 by Pilar Asher RN  Outcome: Not Progressing  6/26/2025 1944 by Ember Win, RT  Outcome: Progressing  Flowsheets  Taken 6/26/2025 1524 by Jeni Morales RN  Achieves optimal ventilation and oxygenation:   Assess for changes in respiratory status   Assess for changes in mentation and behavior   Position to facilitate oxygenation and minimize respiratory effort   Oxygen supplementation based on oxygen saturation or arterial blood gases  Taken 6/26/2025 1126 by Jeni Morales RN  Achieves optimal ventilation and oxygenation:   Assess for changes in respiratory status   Assess for changes in mentation and behavior   Position to facilitate oxygenation and minimize respiratory effort   Oxygen supplementation based on oxygen saturation or arterial blood gases     Problem: Safety - Adult  Goal: Free from fall injury  Outcome: Progressing     Problem: Skin/Tissue Integrity  Goal: Skin integrity remains intact  Description: 1.  Monitor for areas of redness and/or skin breakdown  2.  Assess vascular access sites hourly  3.  Every 4-6 hours minimum:  Change

## 2025-06-27 NOTE — PROGRESS NOTES
Comprehensive Nutrition Assessment    Type and Reason for Visit:  Initial, NPO/clear liquid    Nutrition Recommendations/Plan:   Day 3 NPO - consider SLP eval  if remains off of BiPap    If aligns with GOC, and remains off of BiPap, could consider NGT placement:  Trickle Feeds Osmolite 1.2 kcal @ 20 mL/hour  FWF 50 mL q 3 hours  Provide 3 Prosource/day, flush with 15 mL H2O before and after     Malnutrition Assessment:  Malnutrition Status:  Insufficient data (06/27/25 1220)    Context:  Acute Illness     Findings of the 6 clinical characteristics of malnutrition:  Energy Intake:  Mild decrease in energy intake  Weight Loss:  No weight loss     Body Fat Loss:  Unable to assess     Muscle Mass Loss:  Unable to assess    Fluid Accumulation:  No fluid accumulation     Strength:  Not Performed    Nutrition Assessment:     Patient is a 89 year old male admitted with Hypoxia [R09.02]  COPD exacerbation (HCC) [J44.1]  Acute hypoxemic respiratory failure (HCC) [J96.01]. He  has a past medical history of BPH (benign prostatic hyperplasia), Chronic hypoxemic respiratory failure (HCC), COPD (chronic obstructive pulmonary disease) (HCC), Dementia (HCC), HTN (hypertension), Hyperlipidemia, Macular degeneration of right eye, and Obese.  NPO day 3 in ICU. Has been on and off BiPap - trial of midflow planned today. Does not provide much meaningful information. Weight appears stable. NKFA. No chewing/swallowing problems noted at baseline. Currently off of precedex. Lytes WDL. BG has been slightly elevated >180 mg/dL likely 2/2 steroids.     Trickle feeds at goal 20 mL/hour provide 528 kcal (+ Prosource, 768 kcal, 44% needs), 69 g carbs, 24 g protein (+ 3 Prosource, 84g, 95 % needs). TF + FWF provides 760 mL H2O/day.       Wt Readings from Last 10 Encounters:   06/23/25 73.9 kg (163 lb)   04/13/25 73.6 kg (162 lb 3.2 oz)   09/09/24 72.6 kg (160 lb)   07/15/24 72.6 kg (160 lb)   09/08/23 80.3 kg (177 lb)       Nutrition Related

## 2025-06-27 NOTE — PLAN OF CARE
Problem: Safety - Adult  Goal: Free from fall injury  Outcome: Progressing  Flowsheets (Taken 6/27/2025 0800)  Free From Fall Injury: Instruct family/caregiver on patient safety     Problem: Chronic Conditions and Co-morbidities  Goal: Patient's chronic conditions and co-morbidity symptoms are monitored and maintained or improved  Outcome: Progressing  Flowsheets (Taken 6/27/2025 0800)  Care Plan - Patient's Chronic Conditions and Co-Morbidity Symptoms are Monitored and Maintained or Improved:   Monitor and assess patient's chronic conditions and comorbid symptoms for stability, deterioration, or improvement   Collaborate with multidisciplinary team to address chronic and comorbid conditions and prevent exacerbation or deterioration   Update acute care plan with appropriate goals if chronic or comorbid symptoms are exacerbated and prevent overall improvement and discharge     Problem: Skin/Tissue Integrity  Goal: Skin integrity remains intact  Description: 1.  Monitor for areas of redness and/or skin breakdown  2.  Assess vascular access sites hourly  3.  Every 4-6 hours minimum:  Change oxygen saturation probe site  4.  Every 4-6 hours:  If on nasal continuous positive airway pressure, respiratory therapy assess nares and determine need for appliance change or resting period  Outcome: Progressing  Flowsheets (Taken 6/27/2025 0800)  Skin Integrity Remains Intact:   Monitor for areas of redness and/or skin breakdown   Assess vascular access sites hourly   Every 4-6 hours minimum:  Change oxygen saturation probe site   Every 4-6 hours:  If on nasal continuous positive airway pressure, assess nares and determine need for appliance change or resting period     Problem: Respiratory - Adult  Goal: Achieves optimal ventilation and oxygenation  6/27/2025 0814 by Felicitas Brar, RT  Outcome: Progressing  Flowsheets (Taken 6/27/2025 0800 by Sergio Maik RN)  Achieves optimal ventilation and oxygenation:   Assess for

## 2025-06-27 NOTE — PROGRESS NOTES
Physician Progress Note      PATIENT:               CHARLI WILLINGHAM  CSN #:                  [id number]  :                       12/10/1934  ADMIT DATE:       2025 6:43 PM  DISCH DATE:  RESPONDING  PROVIDER #:        Brandon Mariano MD          QUERY TEXT:    Please clarify in documentation the relationship, if any, between  PNA and Human metapneumovirus organism. Are the conditions:    The clinical indicators include:   CC PN by ANA Curry MD:  Active Problems Being Managed:  Acute on Chronic Hypoxemic/Hypercapnic Respiratory Failure  AECOPD  PNA  ID/MICRO:  - Human metapneumovirus - positive    Labs:   Respiratory panel:  Human Metapneumovirus by PCR Detected Abnormal  Options provided:  -- Related to or associated with each other  -- Unrelated to each other  -- Other - I will add my own diagnosis  -- Disagree - Not applicable / Not valid  -- Disagree - Clinically unable to determine / Unknown  -- Refer to Clinical Documentation Reviewer    PROVIDER RESPONSE TEXT:    The conditions noted are related to or associated with each other.    Query created by: Eleanor Chan on 2025 9:28 AM      QUERY TEXT:    Pneumonia is documented in the medical record CC H&P by MARTHA Gipson APRN - CNP on 25. Please specify the type of pneumonia and the causative   organism:    The clinical indicators include:   CC H&P by MARTHA Gipson APRN - CNP:  Acute on Chronic Hypoxemic/Hypercapnic Respiratory Failure  AECOPD  PNA     MD Flowsheet note by AVEL Newberry-NP:  \"Patient w/ significant increase in WOB. No recent exertion / concern for   aspiration. Nebs and steroid given as ordered. Requiring straight cath x 1.   Bipap initiated.\"     CXR:  Comparison to 2025. Portable exam obtained at 110 demonstrates little  change in the bilateral lower lobe pulmonary infiltrates compared to prior  examination.     CT chest:  Patchy groundglass opacities in the left lung base which may  reflect  pneumonia in the appropriate clinical setting.    MAR:  Maxipime IV  Vibramycin IV  Vanco IV  Options provided:  -- Aspiration pneumonia  -- Other - I will add my own diagnosis  -- Disagree - Not applicable / Not valid  -- Disagree - Clinically unable to determine / Unknown  -- Refer to Clinical Documentation Reviewer    PROVIDER RESPONSE TEXT:    Provider disagreed with this query.  Pneumonia is due to human metapneumovirus    Query created by: Eleanor Chan on 6/27/2025 9:38 AM      QUERY TEXT:    A mental status change is documented in the medical record 6/24 CC PN PN by   RAIZA Villaseñor MD.  Please specify the underlying cause:    The clinical indicators include:  89 yo male with PNA and acute respiratory failure on BIPAP    6/23 H&P by BOOKER Rocha MD:  Review of Systems:  Neurological ROS: no headache, confusion, focal weakness or any other   neurological symptoms    6/24 CC PN by RAIZA Villaseñor MD:  \"Pt down to midflow 12 liters. Still has some work of breathing. Per nursing   pt intermittently confused and trying to get out of bed\"    6/25 Pulmonlolgy H&P by RAIZA Villaseñor MD:  \"6/25: Persistnently on bipap. Agitated overnight, needing precedex. Currently   sleeping but moves with noxious stimuli.\"    MAR:  Precedex gtt  Options provided:  -- Anoxic encephalopathy  -- Metabolic encephalopathy  -- Toxic encephalopathy  -- Toxic metabolic encephalopathy  -- Delirium related to, Please specify cause.  -- Other - I will add my own diagnosis  -- Disagree - Not applicable / Not valid  -- Disagree - Clinically unable to determine / Unknown  -- Refer to Clinical Documentation Reviewer    PROVIDER RESPONSE TEXT:    This patient has metabolic encephalopathy.    Query created by: Eleanor Chan on 6/27/2025 9:55 AM      Electronically signed by:  Brandon Mariano MD 6/27/2025 12:24 PM

## 2025-06-27 NOTE — PROGRESS NOTES
1050- This RN called to bedside by george nino SOB and tachycardic. Intensivist called to bedside, orders to give 2mg morphine and 2mg versed.

## 2025-06-28 LAB
GLUCOSE BLD STRIP.AUTO-MCNC: 125 MG/DL (ref 65–117)
GLUCOSE BLD STRIP.AUTO-MCNC: 192 MG/DL (ref 65–117)
SERVICE CMNT-IMP: ABNORMAL
SERVICE CMNT-IMP: ABNORMAL

## 2025-06-28 PROCEDURE — 6360000002 HC RX W HCPCS: Performed by: NURSE PRACTITIONER

## 2025-06-28 PROCEDURE — 2500000003 HC RX 250 WO HCPCS: Performed by: FAMILY MEDICINE

## 2025-06-28 PROCEDURE — 6360000002 HC RX W HCPCS: Performed by: HOSPITALIST

## 2025-06-28 PROCEDURE — 6360000002 HC RX W HCPCS: Performed by: INTERNAL MEDICINE

## 2025-06-28 PROCEDURE — 1100000000 HC RM PRIVATE

## 2025-06-28 PROCEDURE — 6370000000 HC RX 637 (ALT 250 FOR IP): Performed by: INTERNAL MEDICINE

## 2025-06-28 PROCEDURE — 82962 GLUCOSE BLOOD TEST: CPT

## 2025-06-28 PROCEDURE — 94761 N-INVAS EAR/PLS OXIMETRY MLT: CPT

## 2025-06-28 PROCEDURE — 2500000003 HC RX 250 WO HCPCS: Performed by: NURSE PRACTITIONER

## 2025-06-28 PROCEDURE — 94640 AIRWAY INHALATION TREATMENT: CPT

## 2025-06-28 PROCEDURE — 6360000002 HC RX W HCPCS: Performed by: FAMILY MEDICINE

## 2025-06-28 PROCEDURE — 2580000003 HC RX 258: Performed by: NURSE PRACTITIONER

## 2025-06-28 RX ORDER — DIAZEPAM 10 MG/2ML
5 INJECTION, SOLUTION INTRAMUSCULAR; INTRAVENOUS
Status: DISCONTINUED | OUTPATIENT
Start: 2025-06-28 | End: 2025-06-29 | Stop reason: HOSPADM

## 2025-06-28 RX ORDER — MORPHINE SULFATE 4 MG/ML
4 INJECTION, SOLUTION INTRAMUSCULAR; INTRAVENOUS ONCE
Status: DISCONTINUED | OUTPATIENT
Start: 2025-06-28 | End: 2025-06-29 | Stop reason: HOSPADM

## 2025-06-28 RX ORDER — MORPHINE SULFATE 4 MG/ML
2 INJECTION, SOLUTION INTRAMUSCULAR; INTRAVENOUS
Status: DISCONTINUED | OUTPATIENT
Start: 2025-06-28 | End: 2025-06-29 | Stop reason: HOSPADM

## 2025-06-28 RX ORDER — DIAZEPAM 10 MG/2ML
2.5 INJECTION, SOLUTION INTRAMUSCULAR; INTRAVENOUS ONCE
Status: DISCONTINUED | OUTPATIENT
Start: 2025-06-29 | End: 2025-06-28

## 2025-06-28 RX ORDER — MORPHINE SULFATE 4 MG/ML
4 INJECTION, SOLUTION INTRAMUSCULAR; INTRAVENOUS
Status: DISCONTINUED | OUTPATIENT
Start: 2025-06-28 | End: 2025-06-29 | Stop reason: HOSPADM

## 2025-06-28 RX ORDER — ONDANSETRON 2 MG/ML
4 INJECTION INTRAMUSCULAR; INTRAVENOUS EVERY 6 HOURS PRN
Status: DISCONTINUED | OUTPATIENT
Start: 2025-06-28 | End: 2025-06-29 | Stop reason: HOSPADM

## 2025-06-28 RX ADMIN — MORPHINE SULFATE 2 MG: 4 INJECTION INTRAVENOUS at 07:03

## 2025-06-28 RX ADMIN — MORPHINE SULFATE 2 MG: 4 INJECTION, SOLUTION INTRAMUSCULAR; INTRAVENOUS at 11:59

## 2025-06-28 RX ADMIN — METHYLPREDNISOLONE SODIUM SUCCINATE 60 MG: 40 INJECTION, POWDER, FOR SOLUTION INTRAMUSCULAR; INTRAVENOUS at 09:08

## 2025-06-28 RX ADMIN — MORPHINE SULFATE 2 MG: 4 INJECTION INTRAVENOUS at 02:08

## 2025-06-28 RX ADMIN — INSULIN LISPRO 1 UNITS: 100 INJECTION, SOLUTION INTRAVENOUS; SUBCUTANEOUS at 07:44

## 2025-06-28 RX ADMIN — SODIUM CHLORIDE, PRESERVATIVE FREE 10 ML: 5 INJECTION INTRAVENOUS at 09:09

## 2025-06-28 RX ADMIN — MORPHINE SULFATE 2 MG: 4 INJECTION, SOLUTION INTRAMUSCULAR; INTRAVENOUS at 16:35

## 2025-06-28 RX ADMIN — CEFEPIME 2000 MG: 2 INJECTION, POWDER, FOR SOLUTION INTRAVENOUS at 03:01

## 2025-06-28 RX ADMIN — DEXMEDETOMIDINE HYDROCHLORIDE 0.4 MCG/KG/HR: 400 INJECTION INTRAVENOUS at 07:06

## 2025-06-28 RX ADMIN — ENOXAPARIN SODIUM 40 MG: 100 INJECTION SUBCUTANEOUS at 09:08

## 2025-06-28 RX ADMIN — DIAZEPAM 5 MG: 5 INJECTION, SOLUTION INTRAMUSCULAR; INTRAVENOUS at 23:43

## 2025-06-28 RX ADMIN — MORPHINE SULFATE 4 MG: 4 INJECTION INTRAVENOUS at 21:56

## 2025-06-28 RX ADMIN — ARFORMOTEROL TARTRATE: 15 SOLUTION RESPIRATORY (INHALATION) at 09:25

## 2025-06-28 RX ADMIN — METHYLPREDNISOLONE SODIUM SUCCINATE 60 MG: 40 INJECTION, POWDER, FOR SOLUTION INTRAMUSCULAR; INTRAVENOUS at 00:19

## 2025-06-28 RX ADMIN — DEXMEDETOMIDINE HYDROCHLORIDE 0.2 MCG/KG/HR: 400 INJECTION INTRAVENOUS at 02:20

## 2025-06-28 RX ADMIN — ARFORMOTEROL TARTRATE: 15 SOLUTION RESPIRATORY (INHALATION) at 20:00

## 2025-06-28 ASSESSMENT — PAIN SCALES - GENERAL: PAINLEVEL_OUTOF10: 7

## 2025-06-28 NOTE — H&P
Hospitalist Admission Note    NAME:  Kevan Lynne   :  12/10/1934   MRN:  [id number]     Date/Time:  2025 12:53 PM    Patient PCP: None, None  ________________________________________________________________________    Given the patient's current clinical presentation, I have a high level of concern for decompensation if discharged from the emergency department.  Complex decision making was performed, which includes reviewing the patient's available past medical records, laboratory results, and x-ray films.       My assessment of this patient's clinical condition and my plan of care is as follows.    Assessment / Plan:    89 y.o. with a PMH of BPH, COPD, dementia, HTN, HLD & macular degeneration of right eye who presents to [facility] ED on  with SOB, was declining patient's current condition, the patient was transition to comfort care    # Advanced COPD  Could not tolerate BiPAP  Continue comfort measures    # BPH  On comfort measures    # HTN  #HLD  - Currently on comfort measures    patient's family daughter and granddaughter, I discussed with him plan home understands and agrees with comfort measures, and will consult case management for hospice    I have personally reviewed the radiographs, laboratory data in Epic and decisions and statements above are based partially on this personal interpretation.    Code Status: DNR/DNI  GI Prophylaxis: not indicated       Subjective:   CHIEF COMPLAINT: \"Shortness of breath\"    HISTORY OF PRESENT ILLNESS:     Kevan is a 89 y.o. with a PMH of BPH, COPD, dementia, HTN, HLD & macular degeneration of right eye who presents to [facility] ED on  with SOB. He presented to ED with SpO2 86% on RA that improved with 2 L NC. CXR demonstrating bibasilar interstitial lung disease. Patient was admitted under the care of the internal medicine team with a diagnosis of acute on chronic hypoxemic respiratory failure/AECOPD. In the early morning of  patient was noted to have              Consultant:  phil MOULTON MD   _______________________________________________________________________  Recommended Disposition:   Home with Family x   HH/PT/OT/RN    SNF/LTC    [facility]    ________________________________________________________________________  TOTAL TIME:  50 Minutes        Comments   >50% of visit spent in counseling and coordination of care x Chart reviewed  Discussion with patient and/or family and questions answered     ________________________________________________________________________  Signed: Mati Cochran MD    Portions of this note were completed with Dragon, the computer voice recognition software.  Quite often unanticipated grammatical, syntax, homophones, and other interpretive errors are inadvertently transcribed by the computer software.  Please disregard these errors.  Efforts were made to edit the dictations but occasionally words are mis-transcribed.     Procedures: see electronic medical records for all procedures/Xrays/labs and details which were not copied into this note but were reviewed prior to creation of Plan.

## 2025-06-28 NOTE — PROGRESS NOTES
Name: Kevan Lynne   : 12/10/1934   MRN: [id number]   Date: 2025        Chief Complaint   Patient presents with    Shortness of Breath         HPI:     89 y.o. with a PMH of BPH, COPD, dementia, HTN, HLD & macular degeneration of right eye who presents to [facility] ED on  with SOB.  He presented to ED with SpO2 86% on RA that improved with 2 L NC.  CXR demonstrating bibasilar interstitial lung disease.  Patient was admitted under the care of the internal medicine team with a diagnosis of acute on chronic hypoxemic respiratory failure/AECOPD.  In the early morning of  patient was noted to have increased WOB with respiratory rates in the 30s.  He was placed on BiPAP and ICU was consulted for evaluation.     : Patient continued to have panic and shortness of breath overnight.  This morning, he was on BiPAP.  Was able to stay calm on Precedex and, off of BiPAP, however, still short of breath and somnolent.  Goals of care were discussed with patient's family overnight and agreed to transition to comfort measures.    Active Problems Being Managed:   Acute on Chronic Hypoxemic/Hypercapnic Respiratory Failure  AECOPD  PNA      Assessment/Plan:     Pt had acute decompensation off bipap over night requiring restarting bipap and morphine. He had another episode this morning which required increasing FiO2, morphine and versed. He did not tolerate weaning off bipap for last couple of days. Goals of care discussions held with patient's family.  Goal to transition to comfort measures..      NEUROLOGICAL:      - PTA Namenda, donezepil     PULMONOLOGY:     - Unfortunately, due to severity of patient's underlying COPD and delayed healing along with inability to wean off of BiPAP.  Discussed with patient's family and they agree with transitioning to comfort measures.  - End-of-life comfort orders placed  - Transfer to hospital medicine service      ICU DAILY CHECKLIST   Code Status:DNR  DVT  probability of imminent, clinically significant deterioration, which requires the highest level of preparedness to intervene urgently. I participated in the decision-making and personally managed or directed the management of the following life and organ supporting interventions that required my frequent assessment to treat or prevent imminent deterioration.        Bao Finn MD   Critical Care Medicine  [facility]  [phone]  6/28/2025

## 2025-06-28 NOTE — PROGRESS NOTES
Chart reviewed in preparation for physical therapy evaluation. Spoke with RN who reports patient is not yet medically appropriate for PT interventions. Will continue to follow and re-attempt when medically appropriate.    Thank you,  Jl Cano, PT, DPT

## 2025-06-28 NOTE — CARE COORDINATION
@ 1440 Case management follow up    Case management consult noted- hospice  Confirmed with provider would need GIP consideration/evaluation.      Referral sent via [facility].  VJ

## 2025-06-29 ENCOUNTER — HOSPITAL ENCOUNTER (INPATIENT)
Facility: HOSPITAL | Age: 89
LOS: 2 days | DRG: 951 | End: 2025-07-01
Attending: FAMILY MEDICINE | Admitting: FAMILY MEDICINE
Payer: COMMERCIAL

## 2025-06-29 VITALS
TEMPERATURE: 98.1 F | OXYGEN SATURATION: 93 % | HEART RATE: 115 BPM | DIASTOLIC BLOOD PRESSURE: 62 MMHG | HEIGHT: 66 IN | RESPIRATION RATE: 17 BRPM | BODY MASS INDEX: 26.2 KG/M2 | WEIGHT: 163 LBS | SYSTOLIC BLOOD PRESSURE: 147 MMHG

## 2025-06-29 PROBLEM — J44.9 END STAGE COPD (HCC): Status: ACTIVE | Noted: 2025-06-29

## 2025-06-29 PROCEDURE — 2700000000 HC OXYGEN THERAPY PER DAY

## 2025-06-29 PROCEDURE — 6560000002 HC HOSPICE GENERAL INPATIENT

## 2025-06-29 PROCEDURE — 2500000003 HC RX 250 WO HCPCS: Performed by: FAMILY MEDICINE

## 2025-06-29 PROCEDURE — 6360000002 HC RX W HCPCS: Performed by: HOSPITALIST

## 2025-06-29 PROCEDURE — 94761 N-INVAS EAR/PLS OXIMETRY MLT: CPT

## 2025-06-29 PROCEDURE — 6360000002 HC RX W HCPCS: Performed by: STUDENT IN AN ORGANIZED HEALTH CARE EDUCATION/TRAINING PROGRAM

## 2025-06-29 PROCEDURE — 6360000002 HC RX W HCPCS: Performed by: FAMILY MEDICINE

## 2025-06-29 PROCEDURE — 6370000000 HC RX 637 (ALT 250 FOR IP): Performed by: FAMILY MEDICINE

## 2025-06-29 RX ORDER — SCOPOLAMINE 1 MG/3D
1 PATCH, EXTENDED RELEASE TRANSDERMAL
Status: DISCONTINUED | OUTPATIENT
Start: 2025-06-29 | End: 2025-07-01 | Stop reason: HOSPADM

## 2025-06-29 RX ORDER — SODIUM CHLORIDE 0.9 % (FLUSH) 0.9 %
5-40 SYRINGE (ML) INJECTION EVERY 12 HOURS SCHEDULED
Status: DISCONTINUED | OUTPATIENT
Start: 2025-06-29 | End: 2025-07-01 | Stop reason: HOSPADM

## 2025-06-29 RX ORDER — SODIUM CHLORIDE 0.9 % (FLUSH) 0.9 %
5-40 SYRINGE (ML) INJECTION PRN
Status: DISCONTINUED | OUTPATIENT
Start: 2025-06-29 | End: 2025-07-01 | Stop reason: HOSPADM

## 2025-06-29 RX ORDER — DIAZEPAM 10 MG/2ML
5 INJECTION, SOLUTION INTRAMUSCULAR; INTRAVENOUS
Status: DISCONTINUED | OUTPATIENT
Start: 2025-06-29 | End: 2025-07-01 | Stop reason: HOSPADM

## 2025-06-29 RX ORDER — MORPHINE SULFATE 2 MG/ML
2 INJECTION, SOLUTION INTRAMUSCULAR; INTRAVENOUS
Status: DISCONTINUED | OUTPATIENT
Start: 2025-06-29 | End: 2025-07-01 | Stop reason: HOSPADM

## 2025-06-29 RX ORDER — GLYCOPYRROLATE 0.2 MG/ML
0.2 INJECTION INTRAMUSCULAR; INTRAVENOUS EVERY 4 HOURS
Status: DISCONTINUED | OUTPATIENT
Start: 2025-06-29 | End: 2025-07-01 | Stop reason: HOSPADM

## 2025-06-29 RX ORDER — KETOROLAC TROMETHAMINE 30 MG/ML
30 INJECTION, SOLUTION INTRAMUSCULAR; INTRAVENOUS EVERY 6 HOURS
Status: DISCONTINUED | OUTPATIENT
Start: 2025-06-29 | End: 2025-06-29

## 2025-06-29 RX ORDER — ONDANSETRON 2 MG/ML
4 INJECTION INTRAMUSCULAR; INTRAVENOUS EVERY 6 HOURS PRN
Status: DISCONTINUED | OUTPATIENT
Start: 2025-06-29 | End: 2025-07-01 | Stop reason: HOSPADM

## 2025-06-29 RX ORDER — ACETAMINOPHEN 325 MG/1
650 TABLET ORAL EVERY 4 HOURS PRN
Status: DISCONTINUED | OUTPATIENT
Start: 2025-06-29 | End: 2025-07-01 | Stop reason: HOSPADM

## 2025-06-29 RX ORDER — MORPHINE SULFATE 2 MG/ML
2 INJECTION, SOLUTION INTRAMUSCULAR; INTRAVENOUS
Status: DISCONTINUED | OUTPATIENT
Start: 2025-06-29 | End: 2025-06-29 | Stop reason: HOSPADM

## 2025-06-29 RX ORDER — KETOROLAC TROMETHAMINE 15 MG/ML
15 INJECTION, SOLUTION INTRAMUSCULAR; INTRAVENOUS EVERY 6 HOURS PRN
Status: DISCONTINUED | OUTPATIENT
Start: 2025-06-29 | End: 2025-07-01 | Stop reason: HOSPADM

## 2025-06-29 RX ORDER — MORPHINE SULFATE 2 MG/ML
2 INJECTION, SOLUTION INTRAMUSCULAR; INTRAVENOUS
Status: DISCONTINUED | OUTPATIENT
Start: 2025-06-29 | End: 2025-06-30

## 2025-06-29 RX ORDER — BISACODYL 10 MG
10 SUPPOSITORY, RECTAL RECTAL DAILY PRN
Status: DISCONTINUED | OUTPATIENT
Start: 2025-06-29 | End: 2025-07-01 | Stop reason: HOSPADM

## 2025-06-29 RX ADMIN — MORPHINE SULFATE 2 MG: 2 INJECTION, SOLUTION INTRAMUSCULAR; INTRAVENOUS at 15:33

## 2025-06-29 RX ADMIN — GLYCOPYRROLATE 0.2 MG: 0.2 INJECTION INTRAMUSCULAR; INTRAVENOUS at 15:33

## 2025-06-29 RX ADMIN — MORPHINE SULFATE 2 MG: 2 INJECTION, SOLUTION INTRAMUSCULAR; INTRAVENOUS at 18:48

## 2025-06-29 RX ADMIN — MORPHINE SULFATE 2 MG: 2 INJECTION, SOLUTION INTRAMUSCULAR; INTRAVENOUS at 19:56

## 2025-06-29 RX ADMIN — SODIUM CHLORIDE, PRESERVATIVE FREE 10 ML: 5 INJECTION INTRAVENOUS at 19:56

## 2025-06-29 RX ADMIN — DIAZEPAM 5 MG: 5 INJECTION, SOLUTION INTRAMUSCULAR; INTRAVENOUS at 21:28

## 2025-06-29 RX ADMIN — MORPHINE SULFATE 2 MG: 2 INJECTION, SOLUTION INTRAMUSCULAR; INTRAVENOUS at 08:10

## 2025-06-29 RX ADMIN — MORPHINE SULFATE 2 MG: 2 INJECTION, SOLUTION INTRAMUSCULAR; INTRAVENOUS at 23:53

## 2025-06-29 RX ADMIN — MORPHINE SULFATE 2 MG: 2 INJECTION, SOLUTION INTRAMUSCULAR; INTRAVENOUS at 12:59

## 2025-06-29 RX ADMIN — KETOROLAC TROMETHAMINE 30 MG: 30 INJECTION, SOLUTION INTRAMUSCULAR at 15:33

## 2025-06-29 RX ADMIN — DIAZEPAM 5 MG: 5 INJECTION, SOLUTION INTRAMUSCULAR; INTRAVENOUS at 23:53

## 2025-06-29 RX ADMIN — MORPHINE SULFATE 2 MG: 2 INJECTION, SOLUTION INTRAMUSCULAR; INTRAVENOUS at 21:29

## 2025-06-29 RX ADMIN — GLYCOPYRROLATE 0.2 MG: 0.2 INJECTION INTRAMUSCULAR; INTRAVENOUS at 23:53

## 2025-06-29 RX ADMIN — MORPHINE SULFATE 4 MG: 4 INJECTION INTRAVENOUS at 01:10

## 2025-06-29 RX ADMIN — DIAZEPAM 5 MG: 5 INJECTION, SOLUTION INTRAMUSCULAR; INTRAVENOUS at 18:32

## 2025-06-29 RX ADMIN — MORPHINE SULFATE 2 MG: 2 INJECTION, SOLUTION INTRAMUSCULAR; INTRAVENOUS at 10:49

## 2025-06-29 RX ADMIN — DIAZEPAM 5 MG: 5 INJECTION, SOLUTION INTRAMUSCULAR; INTRAVENOUS at 19:56

## 2025-06-29 RX ADMIN — GLYCOPYRROLATE 0.2 MG: 0.2 INJECTION INTRAMUSCULAR; INTRAVENOUS at 19:56

## 2025-06-29 ASSESSMENT — PAIN SCALES - GENERAL: PAINLEVEL_OUTOF10: 0

## 2025-06-29 NOTE — PLAN OF CARE
Problem: Respiratory - Adult  Goal: Achieves optimal ventilation and oxygenation  6/28/2025 2021 by Loyda Hurley RCP  Outcome: Not Progressing  Flowsheets (Taken 6/28/2025 1430 by Rocio Collier, RN)  Achieves optimal ventilation and oxygenation:   Assess for changes in respiratory status   Assess for changes in mentation and behavior   Position to facilitate oxygenation and minimize respiratory effort  6/28/2025 0927 by Felicitas Brar, RT  Outcome: Not Progressing  Flowsheets (Taken 6/28/2025 0730 by Sergio Maki, RN)  Achieves optimal ventilation and oxygenation:   Assess for changes in respiratory status   Assess for changes in mentation and behavior   Position to facilitate oxygenation and minimize respiratory effort   Oxygen supplementation based on oxygen saturation or arterial blood gases

## 2025-06-29 NOTE — H&P
[facility]   [facility] Help to Those in Need  [phone]     Patient Name:  Kevan Lynne  YOB: 1934         Date of Provider Hospice Visit: 06/29/25     Level of Care:   [x] General Inpatient (GIP)              [] Routine                   [] Respite     Current Location of Care:  [] [facility]           [x] [facility]         [] [facility]        [] [facility]           [] [facility] ([facility])     IF [facility], patient referred from:  [] [facility]           [] [facility]         [] [facility]        [] [facility]           [] Home         [] Other:      Date of Original Hospice Admission:  No admission date for patient encounter.   Hospice Medical Director at time of admission:      Principle Hospice Diagnosis:   Advanced COPD (HCC) [J44.9]   Diagnoses RELATED to the terminal prognosis: Acute Hypoxic Respiratory failure  Other Diagnoses: BPH dementia hypertension and hyperlipidemia and macular degeneration of the right eye     HOSPICE SUMMARY   Kevan Lynne 89-year-old presented to the ER/admitted with acute on chronic hypoxic respiratory failure and acute exacerbation of COPD chest x-ray showed bibasilar interstitial lung disease.  Hospital course -patient had acute decompensation with worsening shortness of breath during this hospital stay and was placed on BiPAP transferred to ICU started on Precedex gtt. goals of CARE has been discussed and comfort care measures have been initiated .  Notably patient has multiple other medical problems including BPH dementia hypertension and hyperlipidemia macular degeneration of the right eye.  Patient admitted to hospice.  He meets criteria for GIP for the symptom control  -dyspnea restlessness and anxiety.    PPS 10%        The patient/family chose comfort measures with the support of Hospice.      HOSPICE DIAGNOSES   Active Symptoms:  1.  Dyspnea  2.  Restlessness  3.  Anxiety        PLAN   Started on morphine 2 mg IV every 2 hours  Started on Valium 5 mg IV every 2 hours  Started on Robinul 0.2 mg IV

## 2025-06-29 NOTE — PROGRESS NOTES
NUTRITION    RD PLAN OF CARE:   Chart reviewed, discussed with Staff.    Diet:Diet NPO  DIET ONE TIME MESSAGE;      Pt is admitted with Hypoxia [R09.02]  COPD exacerbation (HCC) [J44.1]  Acute hypoxemic respiratory failure (HCC) [J96.01].      Pt to be transitioned to comfort measures.  Aggressive nutrition intervention is not indicated at this time.  Will follow and assist as needed.    Noelle Briscoe MS, RD, [facility]  Ext: [phone], or via [employer] | [employer]

## 2025-06-29 NOTE — PLAN OF CARE
Problem: Safety - Adult  Goal: Free from fall injury  Outcome: Progressing     Problem: Chronic Conditions and Co-morbidities  Goal: Patient's chronic conditions and co-morbidity symptoms are monitored and maintained or improved  Outcome: Progressing  Flowsheets (Taken 6/28/2025 1430 by Rocio Collier, RN)  Care Plan - Patient's Chronic Conditions and Co-Morbidity Symptoms are Monitored and Maintained or Improved:   Monitor and assess patient's chronic conditions and comorbid symptoms for stability, deterioration, or improvement   Collaborate with multidisciplinary team to address chronic and comorbid conditions and prevent exacerbation or deterioration   Update acute care plan with appropriate goals if chronic or comorbid symptoms are exacerbated and prevent overall improvement and discharge     Problem: Discharge Planning  Goal: Discharge to home or other facility with appropriate resources  Outcome: Progressing  Flowsheets (Taken 6/28/2025 1430 by Rocio Collier, RN)  Discharge to home or other facility with appropriate resources:   Identify barriers to discharge with patient and caregiver   Arrange for needed discharge resources and transportation as appropriate   Identify discharge learning needs (meds, wound care, etc)   Refer to discharge planning if patient needs post-hospital services based on physician order or complex needs related to functional status, cognitive ability or social support system     Problem: Skin/Tissue Integrity  Goal: Skin integrity remains intact  Description: 1.  Monitor for areas of redness and/or skin breakdown  2.  Assess vascular access sites hourly  3.  Every 4-6 hours minimum:  Change oxygen saturation probe site  4.  Every 4-6 hours:  If on nasal continuous positive airway pressure, respiratory therapy assess nares and determine need for appliance change or resting period  Outcome: Progressing  Flowsheets (Taken 6/28/2025 1430 by Rocio Collier, RN)  Skin

## 2025-06-29 NOTE — PROGRESS NOTES
Hospitalist Progress Note      NAME:  Kevan Lynne   :  12/10/1934  MRM:  [id number]    Date/Time: 2025  10:04 AM           Assessment / Plan:     89 y.o. with a PMH of BPH, COPD, dementia, HTN, HLD & macular degeneration of right eye who presents to [facility] ED on  with SOB, was declining patient's current condition, the patient was transition to comfort care     # Advanced COPD  Could not tolerate BiPAP  Continue comfort measures     # BPH  On comfort measures     # HTN  #HLD  - Currently on comfort measures     patient's family daughter and granddaughter at bedside today,meeting hospice today at 10:00, will FU       #BMI (Calculated): 26.32    I have personally reviewed the radiographs, laboratory data in Epic and decisions and statements above are based partially on this personal interpretation.                 Care Plan discussed with: Patient and Family    Discussed:  Care Plan  Disposition:  Home w/Family w hospice           ___________________________________________________    Attending Physician: Mati Cochran MD        Subjective:     Chief Complaint:  COPD    ROS:  (bold if positive, if negative)    Tolerating PT  Tolerating Diet          Objective:       Vitals:          Last 24hrs VS reviewed since prior progress note. Most recent are:    Vitals:    25 0747   BP: (!) 147/62   Pulse: (!) 115   Resp: 17   Temp: 98.1 °F (36.7 °C)   SpO2: 93%     SpO2 Readings from Last 6 Encounters:   25 93%   25 95%   24 98%   24 96%   23 95%          Intake/Output Summary (Last 24 hours) at 2025 1004  Last data filed at 2025  Gross per 24 hour   Intake 22.6 ml   Output 415 ml   Net -392.4 ml          Exam:     Physical Exam:    Gen:    Elderly ill-looking in no acute distress  HEENT:  Pink conjunctivae, PERRL, hearing intact to voice, moist mucous membranes  Neck:  Supple, without masses, thyroid non-tender  Resp:  No accessory muscle use, clear breath

## 2025-06-29 NOTE — PROGRESS NOTES
Patient and family members refused treatment at this time.  Patient resting on humidified nasal cannula @ 4l/m.     Patient notified that Dr. David Duncan would discuss placing surgery orders for her at her follow up appointment. Patient agreed with the plan.      Courtney Luis MA

## 2025-06-29 NOTE — DISCHARGE SUMMARY
Hospitalist Discharge Summary     Patient ID:  Kevan Lynne  [id number]  89 y.o.  12/10/1934    Admit date: 6/23/2025    Discharge date and time: 6/29/2025    Admission Diagnoses: Hypoxia [R09.02]  COPD exacerbation (HCC) [J44.1]  Acute hypoxemic respiratory failure (HCC) [J96.01]    Discharge Diagnoses:    Principal Problem:    Acute hypoxemic respiratory failure (HCC)  Resolved Problems:    * No resolved hospital problems. *         Hospital Course:     89 y.o. with a PMH of BPH, COPD, dementia, HTN, HLD & macular degeneration of right eye who presents to [facility] ED on 6/23 with SOB, was declining patient's current condition, the patient was transition to comfort care     # Advanced COPD  Could not tolerate BiPAP  Continue comfort measures     # BPH  On comfort measures     # HTN  #HLD  - Currently on comfort measures     Patient will be admitted to hospice service    Imaging  XR CHEST PORTABLE  Result Date: 6/26/2025  No acute process. Severe emphysema destroys the upper lobes. 22Q [quality note only, not part of diagnostic report: 9 mm, linear, black, x-ray cassette artifact, over the left chest. This cassette should be removed and either cleaned or taken out of service.] Electronically signed by Miguel Rodgers    CT CHEST HIGH RESOLUTION  Result Date: 6/25/2025  1. No pneumothorax is seen. Findings of questional pneumothorax on the prior chest radiograph likely reflect patient's severe emphysematous changes combined with skin folds. 2. Flattened trachea seen on expiration images suggestive of tracheomalacia, however there is additionally flattened trachea seen on the inspiration images as well as focus of tracheal mucus. This most likely is related to chronic sequela of emphysema. 3. Patchy groundglass opacities in the left lung base which may reflect pneumonia in the appropriate clinical setting. Electronically signed by Jen Golden    XR CHEST PORTABLE  Result Date: 6/25/2025  1. There is

## 2025-06-30 VITALS
SYSTOLIC BLOOD PRESSURE: 105 MMHG | OXYGEN SATURATION: 97 % | DIASTOLIC BLOOD PRESSURE: 54 MMHG | HEART RATE: 114 BPM | RESPIRATION RATE: 17 BRPM | TEMPERATURE: 100 F

## 2025-06-30 PROCEDURE — 6360000002 HC RX W HCPCS: Performed by: FAMILY MEDICINE

## 2025-06-30 PROCEDURE — 6560000002 HC HOSPICE GENERAL INPATIENT

## 2025-06-30 PROCEDURE — 94761 N-INVAS EAR/PLS OXIMETRY MLT: CPT

## 2025-06-30 PROCEDURE — 2500000003 HC RX 250 WO HCPCS: Performed by: FAMILY MEDICINE

## 2025-06-30 RX ORDER — MORPHINE SULFATE 4 MG/ML
3 INJECTION, SOLUTION INTRAMUSCULAR; INTRAVENOUS
Status: DISCONTINUED | OUTPATIENT
Start: 2025-06-30 | End: 2025-07-01 | Stop reason: HOSPADM

## 2025-06-30 RX ADMIN — DIAZEPAM 5 MG: 5 INJECTION, SOLUTION INTRAMUSCULAR; INTRAVENOUS at 16:49

## 2025-06-30 RX ADMIN — DIAZEPAM 5 MG: 5 INJECTION, SOLUTION INTRAMUSCULAR; INTRAVENOUS at 18:23

## 2025-06-30 RX ADMIN — MORPHINE SULFATE 2 MG: 2 INJECTION, SOLUTION INTRAMUSCULAR; INTRAVENOUS at 16:49

## 2025-06-30 RX ADMIN — MORPHINE SULFATE 2 MG: 2 INJECTION, SOLUTION INTRAMUSCULAR; INTRAVENOUS at 03:45

## 2025-06-30 RX ADMIN — GLYCOPYRROLATE 0.2 MG: 0.2 INJECTION INTRAMUSCULAR; INTRAVENOUS at 10:50

## 2025-06-30 RX ADMIN — DIAZEPAM 5 MG: 5 INJECTION, SOLUTION INTRAMUSCULAR; INTRAVENOUS at 22:11

## 2025-06-30 RX ADMIN — DIAZEPAM 5 MG: 5 INJECTION, SOLUTION INTRAMUSCULAR; INTRAVENOUS at 06:42

## 2025-06-30 RX ADMIN — DIAZEPAM 5 MG: 5 INJECTION, SOLUTION INTRAMUSCULAR; INTRAVENOUS at 13:19

## 2025-06-30 RX ADMIN — MORPHINE SULFATE 3 MG: 4 INJECTION, SOLUTION INTRAMUSCULAR; INTRAVENOUS at 20:20

## 2025-06-30 RX ADMIN — MORPHINE SULFATE 2 MG: 2 INJECTION, SOLUTION INTRAMUSCULAR; INTRAVENOUS at 14:46

## 2025-06-30 RX ADMIN — GLYCOPYRROLATE 0.2 MG: 0.2 INJECTION INTRAMUSCULAR; INTRAVENOUS at 14:46

## 2025-06-30 RX ADMIN — GLYCOPYRROLATE 0.2 MG: 0.2 INJECTION INTRAMUSCULAR; INTRAVENOUS at 08:46

## 2025-06-30 RX ADMIN — MORPHINE SULFATE 2 MG: 2 INJECTION, SOLUTION INTRAMUSCULAR; INTRAVENOUS at 08:46

## 2025-06-30 RX ADMIN — DIAZEPAM 5 MG: 5 INJECTION, SOLUTION INTRAMUSCULAR; INTRAVENOUS at 14:47

## 2025-06-30 RX ADMIN — MORPHINE SULFATE 2 MG: 2 INJECTION, SOLUTION INTRAMUSCULAR; INTRAVENOUS at 06:42

## 2025-06-30 RX ADMIN — GLYCOPYRROLATE 0.2 MG: 0.2 INJECTION INTRAMUSCULAR; INTRAVENOUS at 20:20

## 2025-06-30 RX ADMIN — MORPHINE SULFATE 2 MG: 2 INJECTION, SOLUTION INTRAMUSCULAR; INTRAVENOUS at 13:19

## 2025-06-30 RX ADMIN — MORPHINE SULFATE 3 MG: 4 INJECTION, SOLUTION INTRAMUSCULAR; INTRAVENOUS at 22:11

## 2025-06-30 RX ADMIN — MORPHINE SULFATE 2 MG: 2 INJECTION, SOLUTION INTRAMUSCULAR; INTRAVENOUS at 02:25

## 2025-06-30 RX ADMIN — DIAZEPAM 5 MG: 5 INJECTION, SOLUTION INTRAMUSCULAR; INTRAVENOUS at 03:45

## 2025-06-30 RX ADMIN — DIAZEPAM 5 MG: 5 INJECTION, SOLUTION INTRAMUSCULAR; INTRAVENOUS at 20:19

## 2025-06-30 RX ADMIN — GLYCOPYRROLATE 0.2 MG: 0.2 INJECTION INTRAMUSCULAR; INTRAVENOUS at 03:45

## 2025-06-30 RX ADMIN — DIAZEPAM 5 MG: 5 INJECTION, SOLUTION INTRAMUSCULAR; INTRAVENOUS at 02:25

## 2025-06-30 RX ADMIN — SODIUM CHLORIDE, PRESERVATIVE FREE 10 ML: 5 INJECTION INTRAVENOUS at 08:47

## 2025-06-30 RX ADMIN — DIAZEPAM 5 MG: 5 INJECTION, SOLUTION INTRAMUSCULAR; INTRAVENOUS at 08:47

## 2025-06-30 RX ADMIN — DIAZEPAM 5 MG: 5 INJECTION, SOLUTION INTRAMUSCULAR; INTRAVENOUS at 10:49

## 2025-06-30 RX ADMIN — MORPHINE SULFATE 2 MG: 2 INJECTION, SOLUTION INTRAMUSCULAR; INTRAVENOUS at 10:48

## 2025-06-30 NOTE — PROGRESS NOTES
Admit Date: 6/29/2025      Subjective:     Unresponsive    Scheduled Meds:   sodium chloride flush  5-40 mL IntraVENous 2 times per day    morphine  2 mg IntraVENous Q2H    diazePAM  5 mg IntraVENous Q2H    glycopyrrolate  0.2 mg IntraVENous Q4H    scopolamine  1 patch TransDERmal Q72H     Continuous Infusions:  PRN Meds:sodium chloride flush, acetaminophen, morphine, diazePAM, bisacodyl, ondansetron, ketorolac    Review of Systems  Unable to obtain  Objective:     No data found.  I/O last 3 completed shifts:  In: 0   Out: 100 [Urine:100]  No intake/output data recorded.    PPS is 10%  Cardiovascular: Tachycardic  Respiratory: Rhonchi, tachypnea  Gastrointestinal: Soft   Skin: Warm to touch  Neurologic: Unresponsive   psychiatric: Unable to evaluate      Assessment:     Principal Problem:    End stage COPD (HCC)  Resolved Problems:    * No resolved hospital problems. *      Plan:     Kevan Lynne is 89-year-old presented to the ER/admitted with acute on chronic hypoxic respiratory failure and acute exacerbation of COPD chest x-ray showed bibasilar interstitial lung disease.  patient had acute decompensation with worsening shortness of breath during this hospital stay and was placed on BiPAP transferred to ICU started on Precedex gtt. goals of care has been discussed and comfort care measures have been initiated .  Notably patient has multiple other medical problems including BPH dementia hypertension and hyperlipidemia macular degeneration of the right eye.  Patient continues to meet the criteria for GI PE for symptom control.  Continue morphine every 2 hours increase the dose to 3 mg, continue Valium 5 mg every 2 hours continue scopolamine and Robinul continue as needed medications     CODE STATUS is DNR  Discussed with the daughter, 2 granddaughters at the bedside and updated   Impression: Type 2 diabetes mellitus without complications: N87.9. Plan: Discussed diagnosis in detail with patient. Will continue to observe condition and or symptoms. Patient instructed to call if condition gets worse. Call if [address] worsens. Emphasized blood sugar control.

## 2025-06-30 NOTE — PLAN OF CARE
Problem: Chronic Conditions and Co-morbidities  Goal: Patient's chronic conditions and co-morbidity symptoms are monitored and maintained or improved  Outcome: Progressing  Flowsheets (Taken 6/30/2025 0800)  Care Plan - Patient's Chronic Conditions and Co-Morbidity Symptoms are Monitored and Maintained or Improved: Monitor and assess patient's chronic conditions and comorbid symptoms for stability, deterioration, or improvement     Problem: Skin/Tissue Integrity  Goal: Skin integrity remains intact  Description: 1.  Monitor for areas of redness and/or skin breakdown  2.  Assess vascular access sites hourly  3.  Every 4-6 hours minimum:  Change oxygen saturation probe site  4.  Every 4-6 hours:  If on nasal continuous positive airway pressure, respiratory therapy assess nares and determine need for appliance change or resting period  Outcome: Progressing  Flowsheets (Taken 6/30/2025 0800)  Skin Integrity Remains Intact: Monitor for areas of redness and/or skin breakdown     Problem: Safety - Adult  Goal: Free from fall injury  Outcome: Progressing  Flowsheets (Taken 6/30/2025 0800)  Free From Fall Injury: Instruct family/caregiver on patient safety

## 2025-07-01 NOTE — PROGRESS NOTES
Patient Name: Kevan Lynne  YOB: 1934  Age: 89 y.o.    Rojelio Linares RN Note:  At bedside to see patient who has . Patient is unresponsive. Absent of breath sounds. Pupils fixed and dilated. Apical pulse absent after auscultating for 60sec  TOD: 2025 @ 0019 Pronounced by Shantell Ochoa RN and Nicole Conway RN  Pronounced on behalf of  Melissa Cary MD     Thank you for the opportunity to be of service to this patient.

## 2025-07-01 NOTE — PROGRESS NOTES
Spiritual Health History and Assessment/Progress Note  [facility]    Grief, Loss, and Adjustments,  , Death,      Name: Kevan Lynne MRN: [id number]    Age: 89 y.o.     Sex: male   Language: English   Anabaptist: Tenriism   End stage COPD (HCC)     Date: 2025            Total Time Calculated: 15 min              Spiritual Assessment continued in [facility]        Referral/Consult From: Nurse   Encounter Overview/Reason: Grief, Loss, and Adjustments  Service Provided For: Patient not available    Ermelinda, Belief, Meaning:   Patient Other:    Family/Friends No family/friends present      Importance and Influence:  Patient Other:    Family/Friends No family/friends present    Community:  Patient   Family/Friends No family/friends present    Assessment and Plan of Care:   On-call  paged to be notified of Pt's death. Nurse communicated Pt's passing and no family present and not expected to come at this time.  offered assistance and none needed at this time. Will page again if needed, nurse said.    offered prayer for the family.        Patient Interventions include:   Family/Friends Interventions include: No family/friends present    Patient Plan of Care:   Family/Friends Plan of Care: Spiritual Care available upon further referral    Electronically signed by MAGUI Martin on 2025 at 7:51 AM  For  support, please call Spiritual Health Team @ [phone])

## 2025-07-01 NOTE — DISCHARGE SUMMARY
Hospice Discharge Summary    [facility]  Good Help to Those in Need        Date of Admission: 6/29/2025  Date of Discharge: 7/1/2025  Hospice diagnosis of Advanced COPD (HCC) [J44.9]  End stage COPD (HCC) [J44.9].      Kevan Lynne is 89-year-old presented to the ER/admitted with acute on chronic hypoxic respiratory failure and acute exacerbation of COPD chest x-ray showed bibasilar interstitial lung disease.  patient had acute decompensation with worsening shortness of breath during this hospital stay and was placed on BiPAP transferred to ICU started on Precedex gtt. goals of care has been discussed and comfort care measures have been initiated .  Notably patient has multiple other medical problems including BPH dementia hypertension and hyperlipidemia macular degeneration of the right eye.      The patient's care was focused on comfort and the patient passed away on 7/1/2025.

## 2025-07-02 LAB
FLUID CULTURE: ABNORMAL
Lab: ABNORMAL
ORGANISM ID: ABNORMAL
S PNEUM AG SPEC QL LA: POSITIVE
SPECIMEN SOURCE: ABNORMAL
SPECIMEN: ABNORMAL